# Patient Record
Sex: MALE | Race: WHITE | NOT HISPANIC OR LATINO | Employment: UNEMPLOYED | URBAN - METROPOLITAN AREA
[De-identification: names, ages, dates, MRNs, and addresses within clinical notes are randomized per-mention and may not be internally consistent; named-entity substitution may affect disease eponyms.]

---

## 2023-07-05 ENCOUNTER — OFFICE VISIT (OUTPATIENT)
Dept: PRIMARY CARE | Facility: CLINIC | Age: 78
End: 2023-07-05
Payer: MEDICARE

## 2023-07-05 ENCOUNTER — LAB (OUTPATIENT)
Dept: LAB | Facility: LAB | Age: 78
End: 2023-07-05
Payer: MEDICARE

## 2023-07-05 VITALS
WEIGHT: 248 LBS | DIASTOLIC BLOOD PRESSURE: 78 MMHG | HEIGHT: 75 IN | BODY MASS INDEX: 30.84 KG/M2 | SYSTOLIC BLOOD PRESSURE: 128 MMHG

## 2023-07-05 DIAGNOSIS — R53.83 OTHER FATIGUE: ICD-10-CM

## 2023-07-05 DIAGNOSIS — I25.10 ATHEROSCLEROSIS OF NATIVE CORONARY ARTERY WITHOUT ANGINA PECTORIS, UNSPECIFIED WHETHER NATIVE OR TRANSPLANTED HEART: ICD-10-CM

## 2023-07-05 DIAGNOSIS — E53.9 VITAMIN B DEFICIENCY: ICD-10-CM

## 2023-07-05 DIAGNOSIS — E55.9 VITAMIN D DEFICIENCY: ICD-10-CM

## 2023-07-05 DIAGNOSIS — E78.00 HYPERCHOLESTEROLEMIA: ICD-10-CM

## 2023-07-05 PROBLEM — R53.1 FEELING WEAK: Status: ACTIVE | Noted: 2023-07-05

## 2023-07-05 PROBLEM — R05.9 COUGH: Status: ACTIVE | Noted: 2023-07-05

## 2023-07-05 PROBLEM — R06.02 SHORTNESS OF BREATH: Status: ACTIVE | Noted: 2023-07-05

## 2023-07-05 PROBLEM — S61.411A LACERATION OF RIGHT HAND, INITIAL ENCOUNTER: Status: ACTIVE | Noted: 2023-07-05

## 2023-07-05 PROBLEM — N39.0 UTI (URINARY TRACT INFECTION): Status: ACTIVE | Noted: 2023-07-05

## 2023-07-05 LAB
ALANINE AMINOTRANSFERASE (SGPT) (U/L) IN SER/PLAS: 14 U/L (ref 10–52)
ALBUMIN (G/DL) IN SER/PLAS: 4.7 G/DL (ref 3.4–5)
ALKALINE PHOSPHATASE (U/L) IN SER/PLAS: 78 U/L (ref 33–136)
ANION GAP IN SER/PLAS: 13 MMOL/L (ref 10–20)
APPEARANCE, URINE: CLEAR
ASPARTATE AMINOTRANSFERASE (SGOT) (U/L) IN SER/PLAS: 21 U/L (ref 9–39)
BILIRUBIN TOTAL (MG/DL) IN SER/PLAS: 0.8 MG/DL (ref 0–1.2)
BILIRUBIN, URINE: NEGATIVE
BLOOD, URINE: ABNORMAL
CALCIDIOL (25 OH VITAMIN D3) (NG/ML) IN SER/PLAS: 31 NG/ML
CALCIUM (MG/DL) IN SER/PLAS: 9.8 MG/DL (ref 8.6–10.6)
CARBON DIOXIDE, TOTAL (MMOL/L) IN SER/PLAS: 27 MMOL/L (ref 21–32)
CHLORIDE (MMOL/L) IN SER/PLAS: 103 MMOL/L (ref 98–107)
COBALAMIN (VITAMIN B12) (PG/ML) IN SER/PLAS: 429 PG/ML (ref 211–911)
COLOR, URINE: YELLOW
CREATININE (MG/DL) IN SER/PLAS: 0.85 MG/DL (ref 0.5–1.3)
ERYTHROCYTE DISTRIBUTION WIDTH (RATIO) BY AUTOMATED COUNT: 13.8 % (ref 11.5–14.5)
ERYTHROCYTE MEAN CORPUSCULAR HEMOGLOBIN CONCENTRATION (G/DL) BY AUTOMATED: 32.6 G/DL (ref 32–36)
ERYTHROCYTE MEAN CORPUSCULAR VOLUME (FL) BY AUTOMATED COUNT: 104 FL (ref 80–100)
ERYTHROCYTES (10*6/UL) IN BLOOD BY AUTOMATED COUNT: 4.91 X10E12/L (ref 4.5–5.9)
GFR MALE: 90 ML/MIN/1.73M2
GLUCOSE (MG/DL) IN SER/PLAS: 87 MG/DL (ref 74–99)
GLUCOSE, URINE: NEGATIVE MG/DL
HEMATOCRIT (%) IN BLOOD BY AUTOMATED COUNT: 51.3 % (ref 41–52)
HEMOGLOBIN (G/DL) IN BLOOD: 16.7 G/DL (ref 13.5–17.5)
KETONES, URINE: NEGATIVE MG/DL
LEUKOCYTE ESTERASE, URINE: NEGATIVE
LEUKOCYTES (10*3/UL) IN BLOOD BY AUTOMATED COUNT: 6.1 X10E9/L (ref 4.4–11.3)
MUCUS, URINE: NORMAL /LPF
NITRITE, URINE: NEGATIVE
NRBC (PER 100 WBCS) BY AUTOMATED COUNT: 0 /100 WBC (ref 0–0)
PH, URINE: 5 (ref 5–8)
PLATELETS (10*3/UL) IN BLOOD AUTOMATED COUNT: 179 X10E9/L (ref 150–450)
POTASSIUM (MMOL/L) IN SER/PLAS: 4.5 MMOL/L (ref 3.5–5.3)
PROTEIN TOTAL: 7.6 G/DL (ref 6.4–8.2)
PROTEIN, URINE: NEGATIVE MG/DL
RBC, URINE: 2 /HPF (ref 0–5)
SODIUM (MMOL/L) IN SER/PLAS: 138 MMOL/L (ref 136–145)
SPECIFIC GRAVITY, URINE: 1.02 (ref 1–1.03)
SQUAMOUS EPITHELIAL CELLS, URINE: <1 /HPF
THYROTROPIN (MIU/L) IN SER/PLAS BY DETECTION LIMIT <= 0.05 MIU/L: 0.74 MIU/L (ref 0.44–3.98)
UREA NITROGEN (MG/DL) IN SER/PLAS: 20 MG/DL (ref 6–23)
UROBILINOGEN, URINE: <2 MG/DL (ref 0–1.9)
WBC, URINE: 1 /HPF (ref 0–5)

## 2023-07-05 PROCEDURE — 1159F MED LIST DOCD IN RCRD: CPT | Performed by: INTERNAL MEDICINE

## 2023-07-05 PROCEDURE — 36415 COLL VENOUS BLD VENIPUNCTURE: CPT

## 2023-07-05 PROCEDURE — 81001 URINALYSIS AUTO W/SCOPE: CPT

## 2023-07-05 PROCEDURE — 1126F AMNT PAIN NOTED NONE PRSNT: CPT | Performed by: INTERNAL MEDICINE

## 2023-07-05 PROCEDURE — 82607 VITAMIN B-12: CPT

## 2023-07-05 PROCEDURE — 80053 COMPREHEN METABOLIC PANEL: CPT

## 2023-07-05 PROCEDURE — 99214 OFFICE O/P EST MOD 30 MIN: CPT | Performed by: INTERNAL MEDICINE

## 2023-07-05 PROCEDURE — 84443 ASSAY THYROID STIM HORMONE: CPT

## 2023-07-05 PROCEDURE — 85027 COMPLETE CBC AUTOMATED: CPT

## 2023-07-05 PROCEDURE — 82306 VITAMIN D 25 HYDROXY: CPT

## 2023-07-05 RX ORDER — METOPROLOL SUCCINATE 100 MG/1
TABLET, EXTENDED RELEASE ORAL
COMMUNITY
End: 2023-08-09 | Stop reason: SDUPTHER

## 2023-07-05 RX ORDER — ROSUVASTATIN CALCIUM 10 MG/1
1 TABLET, COATED ORAL DAILY
COMMUNITY
Start: 2020-12-02 | End: 2023-10-27 | Stop reason: SDUPTHER

## 2023-07-05 RX ORDER — SACUBITRIL AND VALSARTAN 49; 51 MG/1; MG/1
1 TABLET, FILM COATED ORAL 2 TIMES DAILY
Qty: 60 TABLET | Refills: 2 | COMMUNITY
Start: 2023-05-20 | End: 2024-01-29 | Stop reason: ALTCHOICE

## 2023-07-05 RX ORDER — MIRABEGRON 25 MG/1
25 TABLET, FILM COATED, EXTENDED RELEASE ORAL DAILY
Qty: 30 TABLET | Refills: 0 | COMMUNITY
Start: 2023-06-10 | End: 2023-07-10

## 2023-07-05 RX ORDER — CLOPIDOGREL BISULFATE 75 MG/1
75 TABLET ORAL DAILY
COMMUNITY

## 2023-07-05 RX ORDER — RIVAROXABAN 20 MG/1
20 TABLET, FILM COATED ORAL DAILY
COMMUNITY

## 2023-07-05 NOTE — PROGRESS NOTES
OFFICE NOTE      NAME OF THE PATIENT: Tesfaye Milton    YOB: 1945    CHIEF COMPLAINT:  This gentleman today came here for follow-up on various conditions.  The patient is totally deconditioned.  He has no stamina.  Extremely tired, fatigued, and exhausted.  Easy fatigability.    PAST MEDICAL HISTORY:  Reviewed on EMR, unchanged.    CURRENT MEDICATIONS:  Reviewed on EMR, unchanged.  List reviewed.    ALLERGIES:  Reviewed on EMR, unchanged.    SOCIAL HISTORY:  Reviewed on EMR, unchanged.  He does not smoke and does not drink alcohol.    FAMILY HISTORY:  Reviewed on EMR, unchanged.    REVIEW OF SYSTEMS:  All 12 systems reviewed and pertaining covered in history and physical.    PHYSICAL EXAMINATION  VITAL SIGNS:  As recorded and reviewed from EMR.  RESPIRATORY:  The patient had normal inspirations and expirations.  The breath sounds were equal bilaterally and clear to auscultation.  CARDIOVASCULAR:  The patient had S1 normal, split S2 without obvious rubs, clicks, or murmurs.    GASTROINTESTINAL:  There was no hepatosplenomegaly.  There were no palpable masses and no inguinal nodes.  EXTREMITIES:  Legs had no edema.  NEUROLOGIC:  The patient had normal cranial nerves.  The reflexes, sensory, and motor examination were grossly within normal limits.    LAB WORK:  Laboratory testing discussed.    ASSESSMENT AND PLAN:  Congestive heart failure, on Entresto.  No echo over a year.  Immediate echocardiogram ordered.  He is on medication.  I wonder whether biventricular pacing is an option here.  We will find out.  Hypertension, okay.  High cholesterol, stable.  Anticoagulation, on Xarelto.  Prostate health.  Prostate cancer.  Dr. Roberts is taking care of it.  Complete blood work ordered.  I shall see him after an echo.      Kindly review this note in conjunction with EMR.   Subjective   Patient ID: Tesfaye Milton is a 77 y.o. male who presents for Follow-up.      HPI    Review of Systems    Objective   BP  "128/78   Ht 1.905 m (6' 3\")   Wt 112 kg (248 lb)   BMI 31.00 kg/m²       Physical Exam    Assessment/Plan   Problem List Items Addressed This Visit    None        "

## 2023-07-28 ENCOUNTER — TELEPHONE (OUTPATIENT)
Dept: PRIMARY CARE | Facility: CLINIC | Age: 78
End: 2023-07-28
Payer: MEDICARE

## 2023-07-28 NOTE — TELEPHONE ENCOUNTER
----- Message from Delia Araujo MD sent at 7/27/2023  4:43 PM EDT -----  Regarding: RE:  He should discuss with cardiologist first than make appointment with me after 8/5   S von  ----- Message -----  From: Emily Lawrence MA  Sent: 7/27/2023   4:41 PM EDT  To: Delia Araujo MD    LOV 07-05-23 you wanted this pt. To have an acho. Pt. Had echo done at a [facility]. He had Dr. Black his cardioloigst look at it and he said his ejection fracture was not getting any better so he upped his meds. If you wanted to speak to Dr. Black about his results his number is [phone]. In your note said see after echo but he's already seeing someone for this issue. When do you want him to follow up?

## 2023-08-09 ENCOUNTER — OFFICE VISIT (OUTPATIENT)
Dept: PRIMARY CARE | Facility: CLINIC | Age: 78
End: 2023-08-09
Payer: MEDICARE

## 2023-08-09 VITALS
SYSTOLIC BLOOD PRESSURE: 142 MMHG | WEIGHT: 249 LBS | BODY MASS INDEX: 30.96 KG/M2 | HEIGHT: 75 IN | DIASTOLIC BLOOD PRESSURE: 78 MMHG

## 2023-08-09 DIAGNOSIS — I50.9 CONGESTIVE HEART FAILURE, UNSPECIFIED HF CHRONICITY, UNSPECIFIED HEART FAILURE TYPE (MULTI): ICD-10-CM

## 2023-08-09 DIAGNOSIS — E78.00 HYPERCHOLESTEROLEMIA: ICD-10-CM

## 2023-08-09 DIAGNOSIS — I10 HYPERTENSION, UNSPECIFIED TYPE: ICD-10-CM

## 2023-08-09 PROCEDURE — 1126F AMNT PAIN NOTED NONE PRSNT: CPT | Performed by: INTERNAL MEDICINE

## 2023-08-09 PROCEDURE — 99214 OFFICE O/P EST MOD 30 MIN: CPT | Performed by: INTERNAL MEDICINE

## 2023-08-09 PROCEDURE — 3078F DIAST BP <80 MM HG: CPT | Performed by: INTERNAL MEDICINE

## 2023-08-09 PROCEDURE — 1159F MED LIST DOCD IN RCRD: CPT | Performed by: INTERNAL MEDICINE

## 2023-08-09 PROCEDURE — 3077F SYST BP >= 140 MM HG: CPT | Performed by: INTERNAL MEDICINE

## 2023-08-09 RX ORDER — METOPROLOL SUCCINATE 100 MG/1
100 TABLET, EXTENDED RELEASE ORAL DAILY
Qty: 90 TABLET | Refills: 0 | Status: SHIPPED | OUTPATIENT
Start: 2023-08-09 | End: 2023-11-07 | Stop reason: SDUPTHER

## 2023-08-09 RX ORDER — TAMSULOSIN HYDROCHLORIDE 0.4 MG/1
CAPSULE ORAL
COMMUNITY
Start: 2022-11-28

## 2023-08-09 ASSESSMENT — ENCOUNTER SYMPTOMS
DEPRESSION: 0
OCCASIONAL FEELINGS OF UNSTEADINESS: 0
LOSS OF SENSATION IN FEET: 0

## 2023-08-09 NOTE — PROGRESS NOTES
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  He saw Dr. Armijo who increased the dose of Entresto.  He had a repeat echocardiogram.  According to the patient, there is not much improvement.  Official report is pending.  Appetite and weight are okay.  No problem.    PAST MEDICAL HISTORY:  Reviewed on EMR, unchanged.    CURRENT MEDICATIONS:  Reviewed on EMR, unchanged.    ALLERGIES:  Reviewed on EMR, unchanged.    SOCIAL HISTORY:  Reviewed on EMR, unchanged.    FAMILY HISTORY:  Reviewed on EMR, unchanged.    REVIEW OF SYSTEMS:  All 12 systems reviewed and pertaining covered in history and physical.    PHYSICAL EXAMINATION  VITAL SIGNS:  As recorded and reviewed from EMR.  RESPIRATORY:  The patient had normal inspirations and expirations.  The breath sounds were equal bilaterally and clear to auscultation.  CARDIOVASCULAR:  The patient had S1 normal, split S2 without obvious rubs, clicks, or murmurs.    GASTROINTESTINAL:  There was no hepatosplenomegaly.  There were no palpable masses and no inguinal nodes.  EXTREMITIES:  Legs had no edema.  NEUROLOGIC:  The patient had normal cranial nerves.  The reflexes, sensory, and motor examination were grossly within normal limits.    LAB WORK:  Laboratory testing discussed.    ASSESSMENT AND PLAN:  Congestive heart failure in the setting of atrial fibrillation and ablation failure. Lifetime anticoagulation.  I wonder we need to consider defibrillator along with biventricular pacing, beyond scope of my practice.  I am going to send him to Dr. Preston or equivalent for further evaluation.  High cholesterol, on medication.  Monitor.  Hypertension, okay.  Follow-up appointment is six weeks with repeat blood work.  Entresto high dose.  We will monitor kidney function.  Continue to follow.    Kindly review this note in conjunction with EMR.   Subjective  "  Patient ID: Tesfaye Milton is a 77 y.o. male who presents for Follow-up.      HPI    Review of Systems    Objective   /78   Ht 1.905 m (6' 3\")   Wt 113 kg (249 lb)   BMI 31.12 kg/m²       Physical Exam    Assessment/Plan   Problem List Items Addressed This Visit    None  Visit Diagnoses       Hypertension, unspecified type        Relevant Medications    metoprolol succinate XL (Toprol-XL) 100 mg 24 hr tablet              "

## 2023-08-30 PROBLEM — I48.92 ATRIAL FLUTTER (MULTI): Status: ACTIVE | Noted: 2023-08-30

## 2023-08-30 PROBLEM — M19.90 ARTHRITIS: Status: ACTIVE | Noted: 2023-08-30

## 2023-08-30 PROBLEM — Z95.5 HISTORY OF CORONARY ARTERY STENT PLACEMENT: Status: ACTIVE | Noted: 2023-08-30

## 2023-08-30 PROBLEM — I42.9 CARDIOMYOPATHY (MULTI): Status: ACTIVE | Noted: 2023-08-30

## 2023-08-30 PROBLEM — K43.2 INCISIONAL HERNIA, WITHOUT OBSTRUCTION OR GANGRENE: Status: ACTIVE | Noted: 2023-08-30

## 2023-08-30 PROBLEM — I21.9 MYOCARDIAL INFARCTION (MULTI): Status: ACTIVE | Noted: 2023-08-30

## 2023-08-30 PROBLEM — I10 ESSENTIAL HYPERTENSION: Status: ACTIVE | Noted: 2023-08-30

## 2023-08-30 PROBLEM — I50.9 HEART FAILURE (MULTI): Status: ACTIVE | Noted: 2023-08-30

## 2023-08-30 PROBLEM — I48.20 CHRONIC ATRIAL FIBRILLATION (MULTI): Status: ACTIVE | Noted: 2023-08-30

## 2023-08-30 PROBLEM — Z91.89 AT RISK FOR BLEEDING: Status: ACTIVE | Noted: 2023-08-30

## 2023-08-30 PROBLEM — I48.19 PERSISTENT ATRIAL FIBRILLATION (MULTI): Status: ACTIVE | Noted: 2023-08-30

## 2023-08-30 PROBLEM — F17.210 CIGARETTE SMOKER: Status: ACTIVE | Noted: 2023-08-30

## 2023-08-30 PROBLEM — I48.0 PAROXYSMAL ATRIAL FIBRILLATION (MULTI): Status: ACTIVE | Noted: 2023-08-30

## 2023-08-30 RX ORDER — NITROGLYCERIN 0.4 MG/1
TABLET SUBLINGUAL
COMMUNITY

## 2023-08-30 RX ORDER — AMLODIPINE BESYLATE 10 MG/1
1 TABLET ORAL DAILY
COMMUNITY
End: 2024-01-29 | Stop reason: ALTCHOICE

## 2023-08-30 RX ORDER — MIRABEGRON 25 MG/1
TABLET, FILM COATED, EXTENDED RELEASE ORAL
COMMUNITY
Start: 2023-07-09

## 2023-08-30 RX ORDER — METOPROLOL TARTRATE 50 MG/1
1 TABLET ORAL 3 TIMES DAILY
COMMUNITY
End: 2024-01-29 | Stop reason: SDUPTHER

## 2023-08-30 RX ORDER — LOSARTAN POTASSIUM 100 MG/1
0.5 TABLET ORAL DAILY
COMMUNITY
End: 2024-01-29 | Stop reason: ALTCHOICE

## 2023-08-30 RX ORDER — PRAVASTATIN SODIUM 40 MG/1
1 TABLET ORAL DAILY
COMMUNITY
End: 2024-01-29 | Stop reason: ALTCHOICE

## 2023-09-20 ENCOUNTER — LAB (OUTPATIENT)
Dept: LAB | Facility: LAB | Age: 78
End: 2023-09-20
Payer: MEDICARE

## 2023-09-20 DIAGNOSIS — I10 HYPERTENSION, UNSPECIFIED TYPE: ICD-10-CM

## 2023-09-20 DIAGNOSIS — E78.00 HYPERCHOLESTEROLEMIA: ICD-10-CM

## 2023-09-20 LAB
ALANINE AMINOTRANSFERASE (SGPT) (U/L) IN SER/PLAS: 13 U/L (ref 10–52)
ALBUMIN (G/DL) IN SER/PLAS: 4.4 G/DL (ref 3.4–5)
ALKALINE PHOSPHATASE (U/L) IN SER/PLAS: 65 U/L (ref 33–136)
ANION GAP IN SER/PLAS: 14 MMOL/L (ref 10–20)
ASPARTATE AMINOTRANSFERASE (SGOT) (U/L) IN SER/PLAS: 16 U/L (ref 9–39)
BILIRUBIN TOTAL (MG/DL) IN SER/PLAS: 0.7 MG/DL (ref 0–1.2)
CALCIUM (MG/DL) IN SER/PLAS: 9.5 MG/DL (ref 8.6–10.6)
CARBON DIOXIDE, TOTAL (MMOL/L) IN SER/PLAS: 29 MMOL/L (ref 21–32)
CHLORIDE (MMOL/L) IN SER/PLAS: 103 MMOL/L (ref 98–107)
CHOLESTEROL (MG/DL) IN SER/PLAS: 155 MG/DL (ref 0–199)
CHOLESTEROL IN HDL (MG/DL) IN SER/PLAS: 51.9 MG/DL
CHOLESTEROL/HDL RATIO: 3
CREATININE (MG/DL) IN SER/PLAS: 0.96 MG/DL (ref 0.5–1.3)
GFR MALE: 82 ML/MIN/1.73M2
GLUCOSE (MG/DL) IN SER/PLAS: 81 MG/DL (ref 74–99)
LDL: 89 MG/DL (ref 0–99)
POTASSIUM (MMOL/L) IN SER/PLAS: 4.7 MMOL/L (ref 3.5–5.3)
PROTEIN TOTAL: 6.9 G/DL (ref 6.4–8.2)
SODIUM (MMOL/L) IN SER/PLAS: 141 MMOL/L (ref 136–145)
THYROTROPIN (MIU/L) IN SER/PLAS BY DETECTION LIMIT <= 0.05 MIU/L: 0.6 MIU/L (ref 0.44–3.98)
TRIGLYCERIDE (MG/DL) IN SER/PLAS: 70 MG/DL (ref 0–149)
UREA NITROGEN (MG/DL) IN SER/PLAS: 20 MG/DL (ref 6–23)
VLDL: 14 MG/DL (ref 0–40)

## 2023-09-20 PROCEDURE — 84443 ASSAY THYROID STIM HORMONE: CPT

## 2023-09-20 PROCEDURE — 80053 COMPREHEN METABOLIC PANEL: CPT

## 2023-09-20 PROCEDURE — 80061 LIPID PANEL: CPT

## 2023-09-20 PROCEDURE — 36415 COLL VENOUS BLD VENIPUNCTURE: CPT

## 2023-09-25 ENCOUNTER — OFFICE VISIT (OUTPATIENT)
Dept: PRIMARY CARE | Facility: CLINIC | Age: 78
End: 2023-09-25
Payer: MEDICARE

## 2023-09-25 VITALS
BODY MASS INDEX: 31.08 KG/M2 | WEIGHT: 250 LBS | HEIGHT: 75 IN | SYSTOLIC BLOOD PRESSURE: 140 MMHG | DIASTOLIC BLOOD PRESSURE: 84 MMHG

## 2023-09-25 DIAGNOSIS — Z00.00 MEDICARE ANNUAL WELLNESS VISIT, INITIAL: Primary | ICD-10-CM

## 2023-09-25 DIAGNOSIS — I10 HYPERTENSION, UNSPECIFIED TYPE: ICD-10-CM

## 2023-09-25 DIAGNOSIS — E78.00 HYPERCHOLESTEROLEMIA: ICD-10-CM

## 2023-09-25 DIAGNOSIS — I50.9 CONGESTIVE HEART FAILURE, UNSPECIFIED HF CHRONICITY, UNSPECIFIED HEART FAILURE TYPE (MULTI): ICD-10-CM

## 2023-09-25 PROCEDURE — 1159F MED LIST DOCD IN RCRD: CPT | Performed by: INTERNAL MEDICINE

## 2023-09-25 PROCEDURE — 3079F DIAST BP 80-89 MM HG: CPT | Performed by: INTERNAL MEDICINE

## 2023-09-25 PROCEDURE — 1170F FXNL STATUS ASSESSED: CPT | Performed by: INTERNAL MEDICINE

## 2023-09-25 PROCEDURE — 99214 OFFICE O/P EST MOD 30 MIN: CPT | Performed by: INTERNAL MEDICINE

## 2023-09-25 PROCEDURE — G0439 PPPS, SUBSEQ VISIT: HCPCS | Performed by: INTERNAL MEDICINE

## 2023-09-25 PROCEDURE — 1126F AMNT PAIN NOTED NONE PRSNT: CPT | Performed by: INTERNAL MEDICINE

## 2023-09-25 PROCEDURE — 3077F SYST BP >= 140 MM HG: CPT | Performed by: INTERNAL MEDICINE

## 2023-09-25 ASSESSMENT — ACTIVITIES OF DAILY LIVING (ADL)
TAKING_MEDICATION: INDEPENDENT
MANAGING_FINANCES: INDEPENDENT
DOING_HOUSEWORK: INDEPENDENT
DRESSING: INDEPENDENT
BATHING: INDEPENDENT
GROCERY_SHOPPING: INDEPENDENT

## 2023-09-25 ASSESSMENT — ENCOUNTER SYMPTOMS
LOSS OF SENSATION IN FEET: 0
DEPRESSION: 0
OCCASIONAL FEELINGS OF UNSTEADINESS: 0

## 2023-09-25 ASSESSMENT — PATIENT HEALTH QUESTIONNAIRE - PHQ9: 2. FEELING DOWN, DEPRESSED OR HOPELESS: NOT AT ALL

## 2023-09-25 NOTE — PROGRESS NOTES
"Subjective   Patient ID: Tesfaye Milton is a 77 y.o. male who presents for Medicare Annual Wellness Visit Initial and Follow-up (multiple medical issues).    It is always a delight to serve Mr. Milton today.  He came here for:  1. Medicare Wellness Visit.  2. Follow-up on blood work and other conditions.  Overall, he is okay.  No problem.  He is a retired dentist.    IMMUNIZATION:  He refused flu shot, pneumonia shot, shingles shot, he does not believe in it.    HEALTH MAINTENANCE:  Colonoscopy was three years ago.  Vision and eye exam okay.  He is regular with the eye checkup, dental checkup.    I have personally reviewed the patient's Past Medical History, Medications, Allergies, Social History, and Family History in the EMR.    Review of Systems   All other systems reviewed and are negative.    Objective   /84   Ht 1.905 m (6' 3\")   Wt 113 kg (250 lb)   BMI 31.25 kg/m²     Physical Exam  Vitals reviewed.   HENT:      Right Ear: Tympanic membrane, ear canal and external ear normal.      Left Ear: Tympanic membrane, ear canal and external ear normal.   Eyes:      General: No scleral icterus.     Pupils: Pupils are equal, round, and reactive to light.   Neck:      Vascular: No carotid bruit.   Cardiovascular:      Heart sounds: Normal heart sounds, S1 normal and S2 normal. No murmur heard.     No friction rub.   Pulmonary:      Effort: Pulmonary effort is normal.      Breath sounds: Normal breath sounds and air entry.   Abdominal:      Palpations: There is no hepatomegaly, splenomegaly or mass.   Genitourinary:     Comments: RECTAL: Deferred by the patient.  Musculoskeletal:         General: No swelling or deformity. Normal range of motion.      Cervical back: Neck supple.      Right lower leg: No edema.      Left lower leg: No edema.   Lymphadenopathy:      Cervical: No cervical adenopathy.      Upper Body:      Right upper body: No axillary adenopathy.      Left upper body: No axillary adenopathy.      " Lower Body: No right inguinal adenopathy. No left inguinal adenopathy.   Neurological:      Mental Status: He is oriented to person, place, and time.      Cranial Nerves: Cranial nerves 2-12 are intact. No cranial nerve deficit.      Sensory: No sensory deficit.      Motor: Motor function is intact. No weakness.      Gait: Gait is intact.      Deep Tendon Reflexes: Reflexes normal.   Psychiatric:         Mood and Affect: Mood is not anxious or depressed. Affect is not angry.         Behavior: Behavior is not agitated.         Thought Content: Thought content normal. Thought content does not include suicidal ideation.         Judgment: Judgment normal.      Comments: Mini-mental status exam okay.  The patient is full code.     LAB WORK: Laboratory testing discussed, done recently.    Assessment/Plan   Problem List Items Addressed This Visit             ICD-10-CM    Hypercholesterolemia E78.00    Relevant Orders    Comprehensive metabolic panel    Lipid panel     Other Visit Diagnoses         Codes    Medicare annual wellness visit, initial    -  Primary Z00.00    Hypertension, unspecified type     I10    Relevant Orders    CBC    Magnesium    Congestive heart failure, unspecified HF chronicity, unspecified heart failure type (CMS/[facility])     I50.9        1. Medicare Wellness Visit, done.  I filled out all the paper work.  The patient filled out paper work.  2. The patient refused immunization.  3. Up to date colonoscopy.  4. The patient is full code.  Not depressed, not suicidal.  5. Complete blood work ordered.  6.  Blood work okay.  7. Hypertension, okay.]  8. High cholesterol, stable.  9. Echo pending.  10. CHF, okay. I will try to get the report from Dr. Armijo.  I stressed the importance of colonoscopy.  11. I filled out all the paperwork.  12. Follow-up appointment with me for routine checkup in three months.  13. Blood work ordered.    Scribe Attestation  By signing my name below, I, Melba Rodriguez    attest that this documentation has been prepared under the direction and in the presence of Delia Araujo MD.

## 2023-10-16 ENCOUNTER — OFFICE VISIT (OUTPATIENT)
Dept: PRIMARY CARE | Facility: CLINIC | Age: 78
End: 2023-10-16
Payer: MEDICARE

## 2023-10-16 VITALS
BODY MASS INDEX: 31.08 KG/M2 | DIASTOLIC BLOOD PRESSURE: 84 MMHG | WEIGHT: 250 LBS | HEIGHT: 75 IN | SYSTOLIC BLOOD PRESSURE: 142 MMHG

## 2023-10-16 DIAGNOSIS — I50.9 CONGESTIVE HEART FAILURE, UNSPECIFIED HF CHRONICITY, UNSPECIFIED HEART FAILURE TYPE (MULTI): Primary | ICD-10-CM

## 2023-10-16 DIAGNOSIS — Z79.01 CURRENT USE OF LONG TERM ANTICOAGULATION: ICD-10-CM

## 2023-10-16 DIAGNOSIS — E78.00 HYPERCHOLESTEROLEMIA: ICD-10-CM

## 2023-10-16 DIAGNOSIS — R53.83 OTHER FATIGUE: ICD-10-CM

## 2023-10-16 DIAGNOSIS — I10 ESSENTIAL HYPERTENSION: ICD-10-CM

## 2023-10-16 PROCEDURE — 1126F AMNT PAIN NOTED NONE PRSNT: CPT | Performed by: INTERNAL MEDICINE

## 2023-10-16 PROCEDURE — 3079F DIAST BP 80-89 MM HG: CPT | Performed by: INTERNAL MEDICINE

## 2023-10-16 PROCEDURE — 3077F SYST BP >= 140 MM HG: CPT | Performed by: INTERNAL MEDICINE

## 2023-10-16 PROCEDURE — 1159F MED LIST DOCD IN RCRD: CPT | Performed by: INTERNAL MEDICINE

## 2023-10-16 PROCEDURE — 99214 OFFICE O/P EST MOD 30 MIN: CPT | Performed by: INTERNAL MEDICINE

## 2023-10-16 ASSESSMENT — ENCOUNTER SYMPTOMS
OCCASIONAL FEELINGS OF UNSTEADINESS: 0
DEPRESSION: 0
LOSS OF SENSATION IN FEET: 0

## 2023-10-17 PROBLEM — Z79.01 CURRENT USE OF LONG TERM ANTICOAGULATION: Status: ACTIVE | Noted: 2023-10-17

## 2023-10-17 PROBLEM — I50.9 CONGESTIVE HEART FAILURE (MULTI): Status: ACTIVE | Noted: 2023-10-17

## 2023-10-17 PROBLEM — R53.83 OTHER FATIGUE: Status: ACTIVE | Noted: 2023-10-17

## 2023-10-17 NOTE — PROGRESS NOTES
"Subjective   Patient ID: Tesfaye Milton is a 78 y.o. male who presents for Follow-up (multiple medical issues. ).    Mr. Tesfaye Milton today came here for multiple medical issues.  He is going for another echo later this week to see if there is a new set of any higher dose of Entresto.  Other than that he is okay.  He is feeling okay.  ______ edema.  He is maintaining his weight about same.  He does not want flu shot.  Today, he came here for follow-up on various conditions.  Appetite and weight are okay.  No problem.    I have personally reviewed the patient's Past Medical History, Medications, Allergies, Social History, and Family History in the EMR.    Review of Systems   All other systems reviewed and are negative.    Objective   /84   Ht 1.905 m (6' 3\")   Wt 113 kg (250 lb)   BMI 31.25 kg/m²     Physical Exam  Vitals reviewed.   Cardiovascular:      Heart sounds: Normal heart sounds, S1 normal and S2 normal. No murmur heard.     No friction rub.   Pulmonary:      Effort: Pulmonary effort is normal.      Breath sounds: Normal breath sounds and air entry.   Abdominal:      Palpations: There is no hepatomegaly, splenomegaly or mass.   Musculoskeletal:      Right lower leg: No edema.      Left lower leg: No edema.   Lymphadenopathy:      Lower Body: No right inguinal adenopathy. No left inguinal adenopathy.   Neurological:      Cranial Nerves: Cranial nerves 2-12 are intact.      Sensory: No sensory deficit.      Motor: Motor function is intact.      Deep Tendon Reflexes: Reflexes are normal and symmetric.     LAB WORK:  Laboratory testing discussed.    Assessment/Plan   Problem List Items Addressed This Visit             ICD-10-CM       Cardiac and Vasculature    Hypercholesterolemia E78.00    Relevant Orders    Lipid Panel    Essential hypertension I10    Relevant Orders    Comprehensive Metabolic Panel    Urinalysis with Reflex Microscopic    Thyroid Stimulating Hormone     Other Visit Diagnoses         " Codes    Current use of long term anticoagulation    -  Primary Z79.01    Congestive heart failure, unspecified HF chronicity, unspecified heart failure type (CMS/[facility])     I50.9    Other fatigue     R53.83    Relevant Orders    CBC        1. Congestive heart failure.  I requested again to  ______(Mohan?) to gave me the echo report, last one and this one.  2. Anticoagulation, on Xarelto.  3. Hypertension, okay.  4. High cholesterol, stable.  5. Weight.  Monitor.  6. Follow-up appointment with me in December.  Happy to see him anytime sooner if necessary.    Scribe Attestation  By signing my name below, I, Melba Rodriguez attest that this documentation has been prepared under the direction and in the presence of Delia Araujo MD.

## 2023-10-27 DIAGNOSIS — E78.00 HYPERCHOLESTEROLEMIA: ICD-10-CM

## 2023-10-27 RX ORDER — ROSUVASTATIN CALCIUM 10 MG/1
10 TABLET, COATED ORAL DAILY
Qty: 90 TABLET | Refills: 1 | Status: SHIPPED | OUTPATIENT
Start: 2023-10-27 | End: 2024-02-26

## 2023-11-07 DIAGNOSIS — I10 HYPERTENSION, UNSPECIFIED TYPE: ICD-10-CM

## 2023-11-07 RX ORDER — METOPROLOL SUCCINATE 100 MG/1
100 TABLET, EXTENDED RELEASE ORAL DAILY
Qty: 90 TABLET | Refills: 0 | Status: SHIPPED | OUTPATIENT
Start: 2023-11-07 | End: 2024-01-29 | Stop reason: SDUPTHER

## 2023-11-14 ENCOUNTER — OFFICE VISIT (OUTPATIENT)
Dept: CARDIOLOGY | Facility: CLINIC | Age: 78
End: 2023-11-14
Payer: MEDICARE

## 2023-11-14 VITALS — DIASTOLIC BLOOD PRESSURE: 86 MMHG | OXYGEN SATURATION: 98 % | SYSTOLIC BLOOD PRESSURE: 150 MMHG | HEART RATE: 92 BPM

## 2023-11-14 DIAGNOSIS — I48.19 PERSISTENT ATRIAL FIBRILLATION (MULTI): ICD-10-CM

## 2023-11-14 DIAGNOSIS — I48.20 CHRONIC ATRIAL FIBRILLATION (MULTI): Primary | ICD-10-CM

## 2023-11-14 PROCEDURE — 99214 OFFICE O/P EST MOD 30 MIN: CPT | Performed by: INTERNAL MEDICINE

## 2023-11-14 PROCEDURE — 1159F MED LIST DOCD IN RCRD: CPT | Performed by: INTERNAL MEDICINE

## 2023-11-14 PROCEDURE — 4004F PT TOBACCO SCREEN RCVD TLK: CPT | Performed by: INTERNAL MEDICINE

## 2023-11-14 PROCEDURE — 3079F DIAST BP 80-89 MM HG: CPT | Performed by: INTERNAL MEDICINE

## 2023-11-14 PROCEDURE — 1126F AMNT PAIN NOTED NONE PRSNT: CPT | Performed by: INTERNAL MEDICINE

## 2023-11-14 PROCEDURE — 3077F SYST BP >= 140 MM HG: CPT | Performed by: INTERNAL MEDICINE

## 2023-11-14 ASSESSMENT — PATIENT HEALTH QUESTIONNAIRE - PHQ9
1. LITTLE INTEREST OR PLEASURE IN DOING THINGS: NOT AT ALL
2. FEELING DOWN, DEPRESSED OR HOPELESS: NOT AT ALL
SUM OF ALL RESPONSES TO PHQ9 QUESTIONS 1 & 2: 0

## 2023-11-14 ASSESSMENT — ENCOUNTER SYMPTOMS
OCCASIONAL FEELINGS OF UNSTEADINESS: 0
LOSS OF SENSATION IN FEET: 0
DEPRESSION: 0

## 2023-11-14 ASSESSMENT — PAIN SCALES - GENERAL: PAINLEVEL: 0-NO PAIN

## 2023-11-14 NOTE — PROGRESS NOTES
Chief Complaint   Patient presents with    Follow-up     1 year follow up            The patient is well-known to me.  He is a 78-year-old male with a history of hypertension, and recurrent atrial arrhythmias post catheter-based therapy and post several attempts with antiarrhythmic drugs to maintain sinus rhythm.  Since taken a rate control approach with anticoagulation.  He is seeing me today for his yearly follow-up.         Active Ambulatory Problems     Diagnosis Date Noted    Atherosclerotic heart disease of native coronary artery without angina pectoris 07/05/2023    Cough 07/05/2023    Feeling weak 07/05/2023    Hypercholesterolemia 07/05/2023    Laceration of right hand, initial encounter 07/05/2023    Shortness of breath 07/05/2023    Vitamin B deficiency 07/05/2023    Vitamin D deficiency 07/05/2023    UTI (urinary tract infection) 07/05/2023    At risk for bleeding 08/30/2023    Arthritis 08/30/2023    Atrial flutter (CMS/HCC) 08/30/2023    Cardiomyopathy (CMS/HCC) 08/30/2023    Chronic atrial fibrillation (CMS/HCC) 08/30/2023    Paroxysmal atrial fibrillation (CMS/HCC) 08/30/2023    Persistent atrial fibrillation (CMS/HCC) 08/30/2023    Cigarette smoker 08/30/2023    Essential hypertension 08/30/2023    Heart failure (CMS/HCC) 08/30/2023    History of coronary artery stent placement 08/30/2023    Incisional hernia, without obstruction or gangrene 08/30/2023    Myocardial infarction (CMS/HCC) 08/30/2023    Congestive heart failure (CMS/HCC) 10/17/2023    Other fatigue 10/17/2023    Current use of long term anticoagulation 10/17/2023     Resolved Ambulatory Problems     Diagnosis Date Noted    No Resolved Ambulatory Problems     Past Medical History:   Diagnosis Date    Anxiety disorder, unspecified     Atrial fibrillation (CMS/HCC)     CAD (coronary artery disease)     CHF (congestive heart failure) (CMS/HCC)     High cholesterol     Hypertension     Long term (current) use of anticoagulants     Other  conditions influencing health status     Personal history of other diseases of male genital organs     Personal history of other diseases of the circulatory system 07/26/2013    Personal history of other endocrine, nutritional and metabolic disease 07/26/2013    Prostate disorder         Review of Systems   All other systems reviewed and are negative.       Objective     Vitals:    11/14/23 0837   BP: 150/86   Pulse: 92   SpO2: 98%        Constitutional:       Appearance: Healthy appearance. Overweight.   Pulmonary:      Effort: Pulmonary effort is normal.      Breath sounds: Normal breath sounds.   Cardiovascular:      PMI at left midclavicular line. Tachycardia present. Irregularly irregular rhythm.      Murmurs: There is a systolic murmur.      No gallop.  No click. No rub.   Pulses:     Intact distal pulses.   Abdominal:      General: Bowel sounds are normal.   Musculoskeletal:      Cervical back: Neck supple. Skin:     General: Skin is warm and dry.   Neurological:      General: No focal deficit present.            Lab Review:   Lab on 09/20/2023   Component Date Value    Glucose 09/20/2023 81     Sodium 09/20/2023 141     Potassium 09/20/2023 4.7     Chloride 09/20/2023 103     Bicarbonate 09/20/2023 29     Anion Gap 09/20/2023 14     Urea Nitrogen 09/20/2023 20     Creatinine 09/20/2023 0.96     GFR MALE 09/20/2023 82     Calcium 09/20/2023 9.5     Albumin 09/20/2023 4.4     Alkaline Phosphatase 09/20/2023 65     Total Protein 09/20/2023 6.9     AST 09/20/2023 16     Total Bilirubin 09/20/2023 0.7     ALT (SGPT) 09/20/2023 13     TSH 09/20/2023 0.60     Cholesterol 09/20/2023 155     HDL 09/20/2023 51.9     Cholesterol/HDL Ratio 09/20/2023 3.0     LDL 09/20/2023 89     VLDL 09/20/2023 14     Triglycerides 09/20/2023 70        ECG:      Problem List Items Addressed This Visit    None

## 2023-12-15 ENCOUNTER — LAB (OUTPATIENT)
Dept: LAB | Facility: LAB | Age: 78
End: 2023-12-15
Payer: MEDICARE

## 2023-12-15 DIAGNOSIS — I10 ESSENTIAL HYPERTENSION: ICD-10-CM

## 2023-12-15 DIAGNOSIS — I10 HYPERTENSION, UNSPECIFIED TYPE: ICD-10-CM

## 2023-12-15 DIAGNOSIS — R53.83 OTHER FATIGUE: ICD-10-CM

## 2023-12-15 DIAGNOSIS — E78.00 HYPERCHOLESTEROLEMIA: ICD-10-CM

## 2023-12-15 LAB
ALBUMIN SERPL BCP-MCNC: 4.2 G/DL (ref 3.4–5)
ALP SERPL-CCNC: 68 U/L (ref 33–136)
ALT SERPL W P-5'-P-CCNC: 19 U/L (ref 10–52)
ANION GAP SERPL CALC-SCNC: 15 MMOL/L (ref 10–20)
APPEARANCE UR: CLEAR
AST SERPL W P-5'-P-CCNC: 20 U/L (ref 9–39)
BILIRUB SERPL-MCNC: 0.9 MG/DL (ref 0–1.2)
BILIRUB UR STRIP.AUTO-MCNC: NEGATIVE MG/DL
BUN SERPL-MCNC: 20 MG/DL (ref 6–23)
CALCIUM SERPL-MCNC: 9.4 MG/DL (ref 8.6–10.6)
CHLORIDE SERPL-SCNC: 103 MMOL/L (ref 98–107)
CHOLEST SERPL-MCNC: 147 MG/DL (ref 0–199)
CHOLESTEROL/HDL RATIO: 3.1
CO2 SERPL-SCNC: 27 MMOL/L (ref 21–32)
COLOR UR: YELLOW
CREAT SERPL-MCNC: 1.07 MG/DL (ref 0.5–1.3)
ERYTHROCYTE [DISTWIDTH] IN BLOOD BY AUTOMATED COUNT: 13.6 % (ref 11.5–14.5)
GFR SERPL CREATININE-BSD FRML MDRD: 72 ML/MIN/1.73M*2
GLUCOSE SERPL-MCNC: 76 MG/DL (ref 74–99)
GLUCOSE UR STRIP.AUTO-MCNC: NEGATIVE MG/DL
HCT VFR BLD AUTO: 51.3 % (ref 41–52)
HDLC SERPL-MCNC: 47.6 MG/DL
HGB BLD-MCNC: 17.2 G/DL (ref 13.5–17.5)
KETONES UR STRIP.AUTO-MCNC: ABNORMAL MG/DL
LDLC SERPL CALC-MCNC: 82 MG/DL
LEUKOCYTE ESTERASE UR QL STRIP.AUTO: NEGATIVE
MAGNESIUM SERPL-MCNC: 2.55 MG/DL (ref 1.6–2.4)
MCH RBC QN AUTO: 35.6 PG (ref 26–34)
MCHC RBC AUTO-ENTMCNC: 33.5 G/DL (ref 32–36)
MCV RBC AUTO: 106 FL (ref 80–100)
MUCOUS THREADS #/AREA URNS AUTO: ABNORMAL /LPF
NITRITE UR QL STRIP.AUTO: NEGATIVE
NON HDL CHOLESTEROL: 99 MG/DL (ref 0–149)
NRBC BLD-RTO: 0 /100 WBCS (ref 0–0)
PH UR STRIP.AUTO: 5 [PH]
PLATELET # BLD AUTO: 157 X10*3/UL (ref 150–450)
POTASSIUM SERPL-SCNC: 4.7 MMOL/L (ref 3.5–5.3)
PROT SERPL-MCNC: 6.8 G/DL (ref 6.4–8.2)
PROT UR STRIP.AUTO-MCNC: ABNORMAL MG/DL
RBC # BLD AUTO: 4.83 X10*6/UL (ref 4.5–5.9)
RBC # UR STRIP.AUTO: ABNORMAL /UL
RBC #/AREA URNS AUTO: >20 /HPF
SODIUM SERPL-SCNC: 140 MMOL/L (ref 136–145)
SP GR UR STRIP.AUTO: 1.02
TRIGL SERPL-MCNC: 86 MG/DL (ref 0–149)
TSH SERPL-ACNC: 0.82 MIU/L (ref 0.44–3.98)
UROBILINOGEN UR STRIP.AUTO-MCNC: 2 MG/DL
VLDL: 17 MG/DL (ref 0–40)
WBC # BLD AUTO: 6 X10*3/UL (ref 4.4–11.3)
WBC #/AREA URNS AUTO: ABNORMAL /HPF

## 2023-12-15 PROCEDURE — 83735 ASSAY OF MAGNESIUM: CPT

## 2023-12-15 PROCEDURE — 84443 ASSAY THYROID STIM HORMONE: CPT

## 2023-12-15 PROCEDURE — 80053 COMPREHEN METABOLIC PANEL: CPT

## 2023-12-15 PROCEDURE — 85027 COMPLETE CBC AUTOMATED: CPT

## 2023-12-15 PROCEDURE — 80061 LIPID PANEL: CPT

## 2023-12-15 PROCEDURE — 81001 URINALYSIS AUTO W/SCOPE: CPT

## 2023-12-15 PROCEDURE — 36415 COLL VENOUS BLD VENIPUNCTURE: CPT

## 2023-12-27 ENCOUNTER — TELEPHONE (OUTPATIENT)
Dept: PRIMARY CARE | Facility: CLINIC | Age: 78
End: 2023-12-27
Payer: MEDICARE

## 2024-01-03 ENCOUNTER — OFFICE VISIT (OUTPATIENT)
Dept: PRIMARY CARE | Facility: CLINIC | Age: 79
End: 2024-01-03
Payer: MEDICARE

## 2024-01-03 VITALS
BODY MASS INDEX: 30.59 KG/M2 | DIASTOLIC BLOOD PRESSURE: 82 MMHG | WEIGHT: 246 LBS | SYSTOLIC BLOOD PRESSURE: 144 MMHG | HEIGHT: 75 IN

## 2024-01-03 DIAGNOSIS — Z12.5 SCREENING FOR MALIGNANT NEOPLASM OF PROSTATE: ICD-10-CM

## 2024-01-03 DIAGNOSIS — I10 ESSENTIAL HYPERTENSION: ICD-10-CM

## 2024-01-03 DIAGNOSIS — I35.0 AORTIC VALVE STENOSIS, ETIOLOGY OF CARDIAC VALVE DISEASE UNSPECIFIED: Primary | ICD-10-CM

## 2024-01-03 DIAGNOSIS — I50.9 CONGESTIVE HEART FAILURE, UNSPECIFIED HF CHRONICITY, UNSPECIFIED HEART FAILURE TYPE (MULTI): ICD-10-CM

## 2024-01-03 DIAGNOSIS — E78.00 HYPERCHOLESTEROLEMIA: ICD-10-CM

## 2024-01-03 DIAGNOSIS — R31.9 HEMATURIA, UNSPECIFIED TYPE: ICD-10-CM

## 2024-01-03 PROCEDURE — 99213 OFFICE O/P EST LOW 20 MIN: CPT | Performed by: INTERNAL MEDICINE

## 2024-01-03 PROCEDURE — 3077F SYST BP >= 140 MM HG: CPT | Performed by: INTERNAL MEDICINE

## 2024-01-03 PROCEDURE — 1126F AMNT PAIN NOTED NONE PRSNT: CPT | Performed by: INTERNAL MEDICINE

## 2024-01-03 PROCEDURE — 1159F MED LIST DOCD IN RCRD: CPT | Performed by: INTERNAL MEDICINE

## 2024-01-03 PROCEDURE — 3079F DIAST BP 80-89 MM HG: CPT | Performed by: INTERNAL MEDICINE

## 2024-01-03 ASSESSMENT — ENCOUNTER SYMPTOMS
OCCASIONAL FEELINGS OF UNSTEADINESS: 0
DEPRESSION: 0
LOSS OF SENSATION IN FEET: 0

## 2024-01-04 NOTE — PROGRESS NOTES
"Subjective   Patient ID: eTsfaye Milton is a 78 y.o. male who presents for Follow-up (multiple medical issues).    Tesfaye Milton today came here for multiple medical issues.  He is feeling okay.  Dr. Armijo did send me his echo report.  He is on Entresto.  Overall, he is okay, but at times he is tired out from sustained physical activity.  His endurance has gone down, but overall he is okay.  He came here for follow-up on various conditions.  He does not snore.  He does not have sleep apnea.  He had a blood work taken.    I have personally reviewed the patient's Past Medical History, Medications, Allergies, Social History, and Family History in the EMR.    Review of Systems   All other systems reviewed and are negative.    Objective   /82   Ht 1.905 m (6' 3\")   Wt 112 kg (246 lb)   BMI 30.75 kg/m²     Physical Exam  Vitals reviewed.   Cardiovascular:      Heart sounds: Normal heart sounds, S1 normal and S2 normal. No murmur heard.     No friction rub.   Pulmonary:      Effort: Pulmonary effort is normal.      Breath sounds: Normal breath sounds and air entry.   Abdominal:      Palpations: There is no hepatomegaly, splenomegaly or mass.   Musculoskeletal:      Right lower leg: No edema.      Left lower leg: No edema.   Lymphadenopathy:      Lower Body: No right inguinal adenopathy. No left inguinal adenopathy.   Neurological:      Cranial Nerves: Cranial nerves 2-12 are intact.      Sensory: No sensory deficit.      Motor: Motor function is intact.      Deep Tendon Reflexes: Reflexes are normal and symmetric.     LAB WORK:  Laboratory testing discussed.    Assessment/Plan   Problem List Items Addressed This Visit             ICD-10-CM       Cardiac and Vasculature    Hypercholesterolemia E78.00    Relevant Orders    Comprehensive Metabolic Panel    Lipid Panel    Essential hypertension I10    Relevant Orders    CBC    Thyroid Stimulating Hormone    Urinalysis with Reflex Microscopic    Congestive heart " failure (CMS/[facility]) I50.9    Relevant Orders    Referral to Cardiology     Other Visit Diagnoses         Codes    Aortic valve stenosis, etiology of cardiac valve disease unspecified    -  Primary I35.0    Screening for malignant neoplasm of prostate     Z12.5    Hematuria, unspecified type     R31.9        1. Low ejection fraction and aortic stenosis, moderate.  Combination of these two, he is on adequate drug therapy, beta-blocker and Entresto, but I think he will definitely benefit from second opinion for congestive heart failure management at tertiary level.  He will continue to follow with Dr. Platt and Dr. Armijo as his base cardiologist.  They are all working in a team.  I reassured him that they are not replacing each other.  2. Hypertension, okay.  3. High cholesterol, stable.  4. Prostate health and blood in the urine.  He sees Dr. Roberts.  5. Routine blood work ordered.  6. Blood work discussed.  7. I shall see him back in March, but always happy to see him anytime sooner if necessary.    Scribe Attestation  By signing my name below, I, Lelia Clayton, Melba attest that this documentation has been prepared under the direction and in the presence of Delia Araujo MD.

## 2024-01-29 ENCOUNTER — OFFICE VISIT (OUTPATIENT)
Dept: CARDIOLOGY | Facility: CLINIC | Age: 79
End: 2024-01-29
Payer: MEDICARE

## 2024-01-29 VITALS — SYSTOLIC BLOOD PRESSURE: 140 MMHG | DIASTOLIC BLOOD PRESSURE: 80 MMHG | HEART RATE: 79 BPM

## 2024-01-29 DIAGNOSIS — I48.21 PERMANENT ATRIAL FIBRILLATION (MULTI): Primary | ICD-10-CM

## 2024-01-29 DIAGNOSIS — I10 HYPERTENSION, UNSPECIFIED TYPE: ICD-10-CM

## 2024-01-29 PROCEDURE — 3079F DIAST BP 80-89 MM HG: CPT

## 2024-01-29 PROCEDURE — 3077F SYST BP >= 140 MM HG: CPT

## 2024-01-29 PROCEDURE — 99213 OFFICE O/P EST LOW 20 MIN: CPT

## 2024-01-29 PROCEDURE — 1126F AMNT PAIN NOTED NONE PRSNT: CPT

## 2024-01-29 PROCEDURE — 1159F MED LIST DOCD IN RCRD: CPT

## 2024-01-29 RX ORDER — SACUBITRIL AND VALSARTAN 97; 103 MG/1; MG/1
1 TABLET, FILM COATED ORAL 2 TIMES DAILY
COMMUNITY

## 2024-01-29 RX ORDER — METOPROLOL SUCCINATE 100 MG/1
100 TABLET, EXTENDED RELEASE ORAL DAILY
Qty: 90 TABLET | Refills: 0 | Status: SHIPPED | OUTPATIENT
Start: 2024-01-29 | End: 2024-02-26

## 2024-01-29 RX ORDER — METOPROLOL TARTRATE 50 MG/1
50 TABLET ORAL 3 TIMES DAILY
Qty: 90 TABLET | Refills: 5 | Status: SHIPPED | OUTPATIENT
Start: 2024-01-29 | End: 2024-02-26

## 2024-01-29 ASSESSMENT — ENCOUNTER SYMPTOMS
DEPRESSION: 0
OCCASIONAL FEELINGS OF UNSTEADINESS: 0
LOSS OF SENSATION IN FEET: 0
DYSPNEA ON EXERTION: 1

## 2024-01-29 ASSESSMENT — PATIENT HEALTH QUESTIONNAIRE - PHQ9
1. LITTLE INTEREST OR PLEASURE IN DOING THINGS: NOT AT ALL
2. FEELING DOWN, DEPRESSED OR HOPELESS: NOT AT ALL
SUM OF ALL RESPONSES TO PHQ9 QUESTIONS 1 AND 2: 0

## 2024-01-29 ASSESSMENT — COLUMBIA-SUICIDE SEVERITY RATING SCALE - C-SSRS
1. IN THE PAST MONTH, HAVE YOU WISHED YOU WERE DEAD OR WISHED YOU COULD GO TO SLEEP AND NOT WAKE UP?: NO
6. HAVE YOU EVER DONE ANYTHING, STARTED TO DO ANYTHING, OR PREPARED TO DO ANYTHING TO END YOUR LIFE?: NO
2. HAVE YOU ACTUALLY HAD ANY THOUGHTS OF KILLING YOURSELF?: NO

## 2024-01-29 NOTE — PROGRESS NOTES
Chief Complaint:   Follow up for chronic AF      History Of Present Illness:    Tesfaye Milton is a 78 y.o. male with a past medical history of atrial fibrillation, chronic systolic congestive heart failure, and hypertension.  Patient has failed numerous antiarrhythmic drugs and is post catheter-based therapy.  We have elected to utilize a rate control approach.  Patient is being seen today for a ZIO follow-up.  ZIO revealed a 100% atrial fibrillation burden with an average heart rate of 77 bpm.  Recent echocardiogram revealed a moderately decreased left ventricular systolic function at 40 to 45%, moderate aortic valve stenosis with a valve area of 1.3 cm and mild to moderate mitral valve regurgitation.  Patient follows with cardiologist Dr. Armijo.  He is compliant with his medications.     Last Recorded Vitals:  Vitals:    01/29/24 0843   BP: 140/80   Pulse: 79       Past Medical History:  He has a past medical history of Anxiety disorder, unspecified, Atrial fibrillation (CMS/HCC), CAD (coronary artery disease), CHF (congestive heart failure) (CMS/HCC), High cholesterol, Hypertension, Long term (current) use of anticoagulants, Other conditions influencing health status, Personal history of other diseases of male genital organs, Personal history of other diseases of the circulatory system (07/26/2013), Personal history of other endocrine, nutritional and metabolic disease (07/26/2013), and Prostate disorder.    Past Surgical History:  He has a past surgical history that includes Umbilical hernia repair (04/08/2013); Other surgical history (04/08/2013); Appendectomy (04/08/2013); and Knee arthroscopy w/ debridement (04/08/2013).      Social History:  He reports that he has quit smoking. His smoking use included cigarettes. He smoked an average of .25 packs per day. He has never used smokeless tobacco. He reports that he does not currently use alcohol after a past usage of about 7.0 standard drinks of alcohol  per week. He reports that he does not use drugs.    Family History:  Family History   Problem Relation Name Age of Onset    Stroke Mother      Dementia Mother      Stroke Father          Allergies:  Patient has no known allergies.    Outpatient Medications:  Current Outpatient Medications   Medication Instructions    clopidogrel (PLAVIX) 75 mg, oral, Daily    metoprolol succinate XL (TOPROL-XL) 100 mg, oral, Daily, Do not crush or chew.    metoprolol tartrate (LOPRESSOR) 50 mg, oral, 3 times daily, For 90 days    mirabegron (Myrbetriq) 25 mg tablet extended release 24 hr 24 hr tablet     nitroglycerin (Nitrostat) 0.4 mg SL tablet sublingual, may be repeated every 5 minutes up to a maximum of 3 doses in a 15 minute period<BR>    rosuvastatin (CRESTOR) 10 mg, oral, Daily    sacubitriL-valsartan (Entresto)  mg tablet 1 tablet, oral, 2 times daily    tamsulosin (Flomax) 0.4 mg 24 hr capsule     Xarelto 20 mg, oral, Daily       Physical Exam:  Physical Exam  Vitals and nursing note reviewed.   Constitutional:       Appearance: Normal appearance.   Cardiovascular:      Rate and Rhythm: Normal rate. Rhythm irregular.      Pulses: Normal pulses.      Heart sounds: Murmur heard.   Pulmonary:      Effort: Pulmonary effort is normal.      Breath sounds: Normal breath sounds.   Abdominal:      General: Bowel sounds are normal.      Palpations: Abdomen is soft.   Musculoskeletal:         General: Normal range of motion.   Skin:     General: Skin is warm and dry.   Neurological:      General: No focal deficit present.      Mental Status: He is alert and oriented to person, place, and time.      Review of Systems   Cardiovascular:  Positive for dyspnea on exertion.   All other systems reviewed and are negative.     Last Labs:  CBC -  Lab Results   Component Value Date    WBC 6.0 12/15/2023    HGB 17.2 12/15/2023    HCT 51.3 12/15/2023     (H) 12/15/2023     12/15/2023       CMP -  Lab Results   Component Value  Date    CALCIUM 9.4 12/15/2023    PROT 6.8 12/15/2023    ALBUMIN 4.2 12/15/2023    AST 20 12/15/2023    ALT 19 12/15/2023    ALKPHOS 68 12/15/2023    BILITOT 0.9 12/15/2023       LIPID PANEL -   Lab Results   Component Value Date    CHOL 147 12/15/2023    TRIG 86 12/15/2023    HDL 47.6 12/15/2023    CHHDL 3.1 12/15/2023    LDLF 89 09/20/2023    VLDL 17 12/15/2023    NHDL 99 12/15/2023       RENAL FUNCTION PANEL -   Lab Results   Component Value Date    GLUCOSE 76 12/15/2023     12/15/2023    K 4.7 12/15/2023     12/15/2023    CO2 27 12/15/2023    ANIONGAP 15 12/15/2023    BUN 20 12/15/2023    CREATININE 1.07 12/15/2023    GFRMALE 82 09/20/2023    CALCIUM 9.4 12/15/2023    ALBUMIN 4.2 12/15/2023        Lab Results   Component Value Date    HGBA1C 5.2 07/09/2021       Assessment/Plan   Diagnoses and all orders for this visit:  Permanent atrial fibrillation (CMS/HCC)  Hypertension, unspecified type  -     metoprolol succinate XL (Toprol-XL) 100 mg 24 hr tablet; Take 1 tablet (100 mg) by mouth once daily. Do not crush or chew.  -     metoprolol tartrate (Lopressor) 50 mg tablet; Take 1 tablet by mouth 3 times a day. For 90 days    History as above.  Continue current regimen.    Eleni Lawrence, APRN-CNP

## 2024-02-13 ENCOUNTER — APPOINTMENT (OUTPATIENT)
Dept: RADIOLOGY | Facility: HOSPITAL | Age: 79
DRG: 280 | End: 2024-02-13
Payer: MEDICARE

## 2024-02-13 ENCOUNTER — APPOINTMENT (OUTPATIENT)
Dept: CARDIOLOGY | Facility: HOSPITAL | Age: 79
DRG: 280 | End: 2024-02-13
Payer: MEDICARE

## 2024-02-13 ENCOUNTER — HOSPITAL ENCOUNTER (INPATIENT)
Facility: HOSPITAL | Age: 79
LOS: 6 days | Discharge: HOSPICE | DRG: 280 | End: 2024-02-19
Attending: EMERGENCY MEDICINE | Admitting: INTERNAL MEDICINE
Payer: MEDICARE

## 2024-02-13 DIAGNOSIS — I21.4 NSTEMI (NON-ST ELEVATED MYOCARDIAL INFARCTION) (MULTI): ICD-10-CM

## 2024-02-13 DIAGNOSIS — R57.0 CARDIOGENIC SHOCK (MULTI): ICD-10-CM

## 2024-02-13 DIAGNOSIS — I50.22 CHRONIC SYSTOLIC (CONGESTIVE) HEART FAILURE (MULTI): Primary | ICD-10-CM

## 2024-02-13 LAB
ALBUMIN SERPL-MCNC: 3 G/DL (ref 3.5–5)
ALP BLD-CCNC: 96 U/L (ref 35–125)
ALT SERPL-CCNC: 1073 U/L (ref 5–40)
ANION GAP BLDA CALCULATED.4IONS-SCNC: 21 MMO/L (ref 10–25)
ANION GAP SERPL CALC-SCNC: 19 MMOL/L
AORTIC VALVE MEAN GRADIENT: 13 MMHG
AORTIC VALVE PEAK VELOCITY: 2.48 M/S
APPEARANCE UR: ABNORMAL
ARTERIAL PATENCY WRIST A: POSITIVE
AST SERPL-CCNC: 1488 U/L (ref 5–40)
AV PEAK GRADIENT: 24.6 MMHG
AVA (PEAK VEL): 0.74 CM2
AVA (VTI): 0.63 CM2
BASE EXCESS BLDA CALC-SCNC: -7.2 MMOL/L (ref -2–3)
BASOPHILS # BLD AUTO: 0.02 X10*3/UL (ref 0–0.1)
BASOPHILS NFR BLD AUTO: 0.2 %
BILIRUB SERPL-MCNC: 1.9 MG/DL (ref 0.1–1.2)
BILIRUB UR STRIP.AUTO-MCNC: NEGATIVE MG/DL
BODY TEMPERATURE: 37 DEGREES CELSIUS
BUN SERPL-MCNC: 77 MG/DL (ref 8–25)
CA-I BLDA-SCNC: 1.07 MMOL/L (ref 1.1–1.33)
CALCIUM SERPL-MCNC: 7 MG/DL (ref 8.5–10.4)
CHLORIDE BLDA-SCNC: 100 MMOL/L (ref 98–107)
CHLORIDE SERPL-SCNC: 105 MMOL/L (ref 97–107)
CO2 SERPL-SCNC: 16 MMOL/L (ref 24–31)
COLOR UR: ABNORMAL
CREAT SERPL-MCNC: 2.9 MG/DL (ref 0.4–1.6)
EGFRCR SERPLBLD CKD-EPI 2021: 22 ML/MIN/1.73M*2
EJECTION FRACTION APICAL 4 CHAMBER: 34.3
EJECTION FRACTION: 27 %
EOSINOPHIL # BLD AUTO: 0 X10*3/UL (ref 0–0.4)
EOSINOPHIL NFR BLD AUTO: 0 %
EPAP CMH2O: 8 CM H2O
ERYTHROCYTE [DISTWIDTH] IN BLOOD BY AUTOMATED COUNT: 14.6 % (ref 11.5–14.5)
GLUCOSE BLD MANUAL STRIP-MCNC: 138 MG/DL (ref 74–99)
GLUCOSE BLDA-MCNC: 164 MG/DL (ref 74–99)
GLUCOSE SERPL-MCNC: 120 MG/DL (ref 65–99)
GLUCOSE UR STRIP.AUTO-MCNC: ABNORMAL MG/DL
HCO3 BLDA-SCNC: 17.1 MMOL/L (ref 22–26)
HCT VFR BLD AUTO: 45.4 % (ref 41–52)
HCT VFR BLD EST: 46 % (ref 41–52)
HGB BLD-MCNC: 14.7 G/DL (ref 13.5–17.5)
HGB BLDA-MCNC: 15.2 G/DL (ref 13.5–17.5)
HYALINE CASTS #/AREA URNS AUTO: ABNORMAL /LPF
IMM GRANULOCYTES # BLD AUTO: 0.08 X10*3/UL (ref 0–0.5)
IMM GRANULOCYTES NFR BLD AUTO: 0.7 % (ref 0–0.9)
INHALED O2 CONCENTRATION: 30 %
IPAP CMH2O: 14 CM H2O
KETONES UR STRIP.AUTO-MCNC: NEGATIVE MG/DL
LACTATE BLDA-SCNC: 3.3 MMOL/L (ref 0.4–2)
LACTATE BLDV-SCNC: 4.8 MMOL/L (ref 0.4–2)
LACTATE BLDV-SCNC: 4.8 MMOL/L (ref 0.4–2)
LACTATE BLDV-SCNC: 5.1 MMOL/L (ref 0.4–2)
LACTATE BLDV-SCNC: 5.8 MMOL/L (ref 0.4–2)
LEFT ATRIUM VOLUME AREA LENGTH INDEX BSA: 131.1 ML/M2
LEFT VENTRICLE INTERNAL DIMENSION DIASTOLE: 6.52 CM (ref 3.5–6)
LEFT VENTRICULAR OUTFLOW TRACT DIAMETER: 2 CM
LEUKOCYTE ESTERASE UR QL STRIP.AUTO: ABNORMAL
LYMPHOCYTES # BLD AUTO: 0.74 X10*3/UL (ref 0.8–3)
LYMPHOCYTES NFR BLD AUTO: 6.1 %
MCH RBC QN AUTO: 34.6 PG (ref 26–34)
MCHC RBC AUTO-ENTMCNC: 32.4 G/DL (ref 32–36)
MCV RBC AUTO: 107 FL (ref 80–100)
MONOCYTES # BLD AUTO: 1 X10*3/UL (ref 0.05–0.8)
MONOCYTES NFR BLD AUTO: 8.3 %
MUCOUS THREADS #/AREA URNS AUTO: ABNORMAL /LPF
NEUTROPHILS # BLD AUTO: 10.22 X10*3/UL (ref 1.6–5.5)
NEUTROPHILS NFR BLD AUTO: 84.7 %
NITRITE UR QL STRIP.AUTO: NEGATIVE
NRBC BLD-RTO: 0 /100 WBCS (ref 0–0)
NT-PROBNP SERPL-MCNC: ABNORMAL PG/ML (ref 0–852)
OXYHGB MFR BLDA: 94.2 % (ref 94–98)
PCO2 BLDA: 31 MM HG (ref 38–42)
PH BLDA: 7.35 PH (ref 7.38–7.42)
PH UR STRIP.AUTO: 5 [PH]
PLATELET # BLD AUTO: 210 X10*3/UL (ref 150–450)
PO2 BLDA: 119 MM HG (ref 85–95)
POTASSIUM BLDA-SCNC: 5.1 MMOL/L (ref 3.5–5.3)
POTASSIUM SERPL-SCNC: 4.5 MMOL/L (ref 3.4–5.1)
PROT SERPL-MCNC: 5.3 G/DL (ref 5.9–7.9)
PROT UR STRIP.AUTO-MCNC: ABNORMAL MG/DL
RBC # BLD AUTO: 4.25 X10*6/UL (ref 4.5–5.9)
RBC # UR STRIP.AUTO: ABNORMAL /UL
RBC #/AREA URNS AUTO: >20 /HPF
RIGHT VENTRICLE FREE WALL PEAK S': 6.2 CM/S
RIGHT VENTRICLE PEAK SYSTOLIC PRESSURE: 56.5 MMHG
SAO2 % BLDA: 97 % (ref 94–100)
SODIUM BLDA-SCNC: 133 MMOL/L (ref 136–145)
SODIUM SERPL-SCNC: 140 MMOL/L (ref 133–145)
SP GR UR STRIP.AUTO: 1.02
SPECIMEN DRAWN FROM PATIENT: ABNORMAL
SQUAMOUS #/AREA URNS AUTO: ABNORMAL /HPF
TRICUSPID ANNULAR PLANE SYSTOLIC EXCURSION: 1.2 CM
TROPONIN T SERPL-MCNC: 1236 NG/L
TROPONIN T SERPL-MCNC: 1746 NG/L
UROBILINOGEN UR STRIP.AUTO-MCNC: ABNORMAL MG/DL
VENTILATOR RATE: 16 BPM
WBC # BLD AUTO: 12.1 X10*3/UL (ref 4.4–11.3)
WBC #/AREA URNS AUTO: ABNORMAL /HPF

## 2024-02-13 PROCEDURE — 82947 ASSAY GLUCOSE BLOOD QUANT: CPT

## 2024-02-13 PROCEDURE — 2500000005 HC RX 250 GENERAL PHARMACY W/O HCPCS

## 2024-02-13 PROCEDURE — 84484 ASSAY OF TROPONIN QUANT: CPT | Performed by: PHYSICIAN ASSISTANT

## 2024-02-13 PROCEDURE — 51702 INSERT TEMP BLADDER CATH: CPT

## 2024-02-13 PROCEDURE — 93005 ELECTROCARDIOGRAM TRACING: CPT

## 2024-02-13 PROCEDURE — 83605 ASSAY OF LACTIC ACID: CPT | Performed by: PHYSICIAN ASSISTANT

## 2024-02-13 PROCEDURE — 2500000005 HC RX 250 GENERAL PHARMACY W/O HCPCS: Performed by: INTERNAL MEDICINE

## 2024-02-13 PROCEDURE — 3E033XZ INTRODUCTION OF VASOPRESSOR INTO PERIPHERAL VEIN, PERCUTANEOUS APPROACH: ICD-10-PCS | Performed by: INTERNAL MEDICINE

## 2024-02-13 PROCEDURE — 83605 ASSAY OF LACTIC ACID: CPT | Mod: MUE | Performed by: INTERNAL MEDICINE

## 2024-02-13 PROCEDURE — 81001 URINALYSIS AUTO W/SCOPE: CPT | Performed by: PHYSICIAN ASSISTANT

## 2024-02-13 PROCEDURE — 83880 ASSAY OF NATRIURETIC PEPTIDE: CPT | Performed by: PHYSICIAN ASSISTANT

## 2024-02-13 PROCEDURE — 36415 COLL VENOUS BLD VENIPUNCTURE: CPT | Performed by: PHYSICIAN ASSISTANT

## 2024-02-13 PROCEDURE — 80053 COMPREHEN METABOLIC PANEL: CPT | Performed by: PHYSICIAN ASSISTANT

## 2024-02-13 PROCEDURE — 2020000001 HC ICU ROOM DAILY

## 2024-02-13 PROCEDURE — 87040 BLOOD CULTURE FOR BACTERIA: CPT | Mod: WESLAB | Performed by: PHYSICIAN ASSISTANT

## 2024-02-13 PROCEDURE — 2500000004 HC RX 250 GENERAL PHARMACY W/ HCPCS (ALT 636 FOR OP/ED): Performed by: INTERNAL MEDICINE

## 2024-02-13 PROCEDURE — 96374 THER/PROPH/DIAG INJ IV PUSH: CPT | Mod: 59

## 2024-02-13 PROCEDURE — 94660 CPAP INITIATION&MGMT: CPT

## 2024-02-13 PROCEDURE — 85025 COMPLETE CBC W/AUTO DIFF WBC: CPT | Performed by: PHYSICIAN ASSISTANT

## 2024-02-13 PROCEDURE — 96365 THER/PROPH/DIAG IV INF INIT: CPT | Mod: 59

## 2024-02-13 PROCEDURE — 99285 EMERGENCY DEPT VISIT HI MDM: CPT | Mod: 25 | Performed by: EMERGENCY MEDICINE

## 2024-02-13 PROCEDURE — 71045 X-RAY EXAM CHEST 1 VIEW: CPT

## 2024-02-13 PROCEDURE — 2500000004 HC RX 250 GENERAL PHARMACY W/ HCPCS (ALT 636 FOR OP/ED): Performed by: PHYSICIAN ASSISTANT

## 2024-02-13 PROCEDURE — 84132 ASSAY OF SERUM POTASSIUM: CPT | Performed by: INTERNAL MEDICINE

## 2024-02-13 PROCEDURE — 2500000005 HC RX 250 GENERAL PHARMACY W/O HCPCS: Performed by: STUDENT IN AN ORGANIZED HEALTH CARE EDUCATION/TRAINING PROGRAM

## 2024-02-13 PROCEDURE — 36600 WITHDRAWAL OF ARTERIAL BLOOD: CPT

## 2024-02-13 PROCEDURE — 93306 TTE W/DOPPLER COMPLETE: CPT

## 2024-02-13 PROCEDURE — 87086 URINE CULTURE/COLONY COUNT: CPT | Mod: WESLAB | Performed by: PHYSICIAN ASSISTANT

## 2024-02-13 PROCEDURE — 5A09357 ASSISTANCE WITH RESPIRATORY VENTILATION, LESS THAN 24 CONSECUTIVE HOURS, CONTINUOUS POSITIVE AIRWAY PRESSURE: ICD-10-PCS | Performed by: INTERNAL MEDICINE

## 2024-02-13 PROCEDURE — 9420000001 HC RT PATIENT EDUCATION 5 MIN

## 2024-02-13 PROCEDURE — 99291 CRITICAL CARE FIRST HOUR: CPT | Performed by: INTERNAL MEDICINE

## 2024-02-13 RX ORDER — ROSUVASTATIN CALCIUM 10 MG/1
10 TABLET, COATED ORAL NIGHTLY
Status: DISCONTINUED | OUTPATIENT
Start: 2024-02-13 | End: 2024-02-13

## 2024-02-13 RX ORDER — METOPROLOL SUCCINATE 100 MG/1
100 TABLET, EXTENDED RELEASE ORAL DAILY
Status: DISCONTINUED | OUTPATIENT
Start: 2024-02-13 | End: 2024-02-19 | Stop reason: HOSPADM

## 2024-02-13 RX ORDER — CEFTRIAXONE 1 G/50ML
1 INJECTION, SOLUTION INTRAVENOUS EVERY 24 HOURS
Status: DISCONTINUED | OUTPATIENT
Start: 2024-02-13 | End: 2024-02-17

## 2024-02-13 RX ORDER — AMOXICILLIN 250 MG
2 CAPSULE ORAL 2 TIMES DAILY
Status: DISCONTINUED | OUTPATIENT
Start: 2024-02-13 | End: 2024-02-19 | Stop reason: HOSPADM

## 2024-02-13 RX ORDER — NOREPINEPHRINE BITARTRATE/D5W 8 MG/250ML
.01-.5 PLASTIC BAG, INJECTION (ML) INTRAVENOUS CONTINUOUS
Status: DISCONTINUED | OUTPATIENT
Start: 2024-02-13 | End: 2024-02-17

## 2024-02-13 RX ORDER — PANTOPRAZOLE SODIUM 40 MG/10ML
40 INJECTION, POWDER, LYOPHILIZED, FOR SOLUTION INTRAVENOUS
Status: DISCONTINUED | OUTPATIENT
Start: 2024-02-14 | End: 2024-02-19 | Stop reason: HOSPADM

## 2024-02-13 RX ORDER — DOBUTAMINE HYDROCHLORIDE 400 MG/100ML
2.5-5 INJECTION INTRAVENOUS CONTINUOUS
Status: DISCONTINUED | OUTPATIENT
Start: 2024-02-13 | End: 2024-02-19 | Stop reason: HOSPADM

## 2024-02-13 RX ORDER — TAMSULOSIN HYDROCHLORIDE 0.4 MG/1
0.4 CAPSULE ORAL DAILY
Status: DISCONTINUED | OUTPATIENT
Start: 2024-02-13 | End: 2024-02-19 | Stop reason: HOSPADM

## 2024-02-13 RX ORDER — METOPROLOL TARTRATE 50 MG/1
50 TABLET ORAL 3 TIMES DAILY
Status: DISCONTINUED | OUTPATIENT
Start: 2024-02-13 | End: 2024-02-19 | Stop reason: HOSPADM

## 2024-02-13 RX ORDER — NOREPINEPHRINE BITARTRATE/D5W 8 MG/250ML
.01-.5 PLASTIC BAG, INJECTION (ML) INTRAVENOUS CONTINUOUS
Status: DISCONTINUED | OUTPATIENT
Start: 2024-02-13 | End: 2024-02-13

## 2024-02-13 RX ORDER — PANTOPRAZOLE SODIUM 40 MG/1
40 TABLET, DELAYED RELEASE ORAL
Status: DISCONTINUED | OUTPATIENT
Start: 2024-02-14 | End: 2024-02-19 | Stop reason: HOSPADM

## 2024-02-13 RX ORDER — CLOPIDOGREL BISULFATE 75 MG/1
75 TABLET ORAL DAILY
Status: DISCONTINUED | OUTPATIENT
Start: 2024-02-13 | End: 2024-02-19 | Stop reason: HOSPADM

## 2024-02-13 RX ADMIN — NOREPINEPHRINE BITARTRATE 0.1 MCG/KG/MIN: 8 INJECTION, SOLUTION INTRAVENOUS at 15:13

## 2024-02-13 RX ADMIN — PIPERACILLIN SODIUM AND TAZOBACTAM SODIUM 3.38 G: 3; .375 INJECTION, SOLUTION INTRAVENOUS at 14:41

## 2024-02-13 RX ADMIN — Medication: at 18:15

## 2024-02-13 RX ADMIN — DOBUTAMINE IN DEXTROSE 2.5 MCG/KG/MIN: 400 INJECTION, SOLUTION INTRAVENOUS at 18:20

## 2024-02-13 RX ADMIN — NOREPINEPHRINE BITARTRATE 0.21 MCG/KG/MIN: 8 INJECTION, SOLUTION INTRAVENOUS at 21:56

## 2024-02-13 RX ADMIN — SODIUM CHLORIDE 500 ML: 900 INJECTION, SOLUTION INTRAVENOUS at 14:41

## 2024-02-13 RX ADMIN — CEFTRIAXONE SODIUM 1 G: 1 INJECTION, SOLUTION INTRAVENOUS at 22:16

## 2024-02-13 SDOH — SOCIAL STABILITY: SOCIAL INSECURITY: ABUSE: ADULT

## 2024-02-13 SDOH — SOCIAL STABILITY: SOCIAL INSECURITY: DOES ANYONE TRY TO KEEP YOU FROM HAVING/CONTACTING OTHER FRIENDS OR DOING THINGS OUTSIDE YOUR HOME?: NO

## 2024-02-13 SDOH — SOCIAL STABILITY: SOCIAL INSECURITY: HAS ANYONE EVER THREATENED TO HURT YOUR FAMILY OR YOUR PETS?: NO

## 2024-02-13 SDOH — SOCIAL STABILITY: SOCIAL INSECURITY: ARE THERE ANY APPARENT SIGNS OF INJURIES/BEHAVIORS THAT COULD BE RELATED TO ABUSE/NEGLECT?: NO

## 2024-02-13 SDOH — SOCIAL STABILITY: SOCIAL INSECURITY: WERE YOU ABLE TO COMPLETE ALL THE BEHAVIORAL HEALTH SCREENINGS?: YES

## 2024-02-13 SDOH — SOCIAL STABILITY: SOCIAL INSECURITY: DO YOU FEEL UNSAFE GOING BACK TO THE PLACE WHERE YOU ARE LIVING?: NO

## 2024-02-13 SDOH — SOCIAL STABILITY: SOCIAL INSECURITY: DO YOU FEEL ANYONE HAS EXPLOITED OR TAKEN ADVANTAGE OF YOU FINANCIALLY OR OF YOUR PERSONAL PROPERTY?: NO

## 2024-02-13 SDOH — SOCIAL STABILITY: SOCIAL INSECURITY: ARE YOU OR HAVE YOU BEEN THREATENED OR ABUSED PHYSICALLY, EMOTIONALLY, OR SEXUALLY BY ANYONE?: NO

## 2024-02-13 SDOH — SOCIAL STABILITY: SOCIAL INSECURITY: HAVE YOU HAD THOUGHTS OF HARMING ANYONE ELSE?: NO

## 2024-02-13 ASSESSMENT — COGNITIVE AND FUNCTIONAL STATUS - GENERAL
MOBILITY SCORE: 24
DAILY ACTIVITIY SCORE: 24
PATIENT BASELINE BEDBOUND: NO

## 2024-02-13 ASSESSMENT — LIFESTYLE VARIABLES
HOW MANY STANDARD DRINKS CONTAINING ALCOHOL DO YOU HAVE ON A TYPICAL DAY: PATIENT DOES NOT DRINK
PRESCIPTION_ABUSE_PAST_12_MONTHS: NO
HOW OFTEN DO YOU HAVE A DRINK CONTAINING ALCOHOL: NEVER
AUDIT-C TOTAL SCORE: 0
SUBSTANCE_ABUSE_PAST_12_MONTHS: NO
SKIP TO QUESTIONS 9-10: 1
AUDIT-C TOTAL SCORE: 0
HOW OFTEN DO YOU HAVE 6 OR MORE DRINKS ON ONE OCCASION: NEVER

## 2024-02-13 ASSESSMENT — ACTIVITIES OF DAILY LIVING (ADL)
LACK_OF_TRANSPORTATION: NO
WALKS IN HOME: INDEPENDENT
HEARING - LEFT EAR: FUNCTIONAL
JUDGMENT_ADEQUATE_SAFELY_COMPLETE_DAILY_ACTIVITIES: YES
ADEQUATE_TO_COMPLETE_ADL: YES
HEARING - RIGHT EAR: FUNCTIONAL
FEEDING YOURSELF: INDEPENDENT
PATIENT'S MEMORY ADEQUATE TO SAFELY COMPLETE DAILY ACTIVITIES?: YES
BATHING: INDEPENDENT
DRESSING YOURSELF: INDEPENDENT
GROOMING: INDEPENDENT
TOILETING: INDEPENDENT

## 2024-02-13 ASSESSMENT — PAIN - FUNCTIONAL ASSESSMENT: PAIN_FUNCTIONAL_ASSESSMENT: 0-10

## 2024-02-13 ASSESSMENT — PAIN SCALES - GENERAL: PAINLEVEL_OUTOF10: 0 - NO PAIN

## 2024-02-13 NOTE — SIGNIFICANT EVENT
Rt called to bedside for pt who arrived from the ed short of breath. Per Dr Gharibeh pt was placed on cpap +8 30%. Pt became apenic and switched to bipap 14/8 R 16. ABG being obtained

## 2024-02-13 NOTE — PROGRESS NOTES
IN BRIEF    History: 78-year-old male presents from his cardiologist office with complaint of shortness of breath.  Cardiology notified us of the patient prior to his arrival.  He has a history of ischemic cardiomyopathy and is noticing shortness of breath that is been worsening for the past several weeks.  There is been additional swelling and has been having associated chest pain as well.    Exam: On exam patient is comfortable when he is not moving however there is significant dyspnea upon minimal movement or exertion.  Lungs are coarse at the bases bilaterally.  Heart is regular rate and rhythm       ED COURSE   MDM: Patient presents with concern of acute heart failure exacerbation with concern for pulmonary edema and fluid overload.  He is placed on oxygen.  Initial blood pressure is stable, however his blood pressure has continually decreased on the emergency department.  Case is discussed with the cardiologist for initiation of potential dobutamine, they advised if needed to trial Levophed.  Do not suspect an acute infectious source at this time, however he will be covered with antibiotics from infectious source to be discovered in the setting of this hypotension.  Will trial a 500 cc bolus of fluids, however he appears grossly fluid overloaded in the setting of likely heart failure and pulmonary edema we will not provide the 30 cc/kg bolus.      I personally saw the patient and made/approved the management plan and take responsibility for the patient management.  Parts of this chart were completed with dictation software, please excuse any errors in transcription.     Amanda Buitrago, DO  2:44 PM

## 2024-02-13 NOTE — ED PROVIDER NOTES
Patient was signed out to me at approximately 2 PM by the AM physician Dr. Buitrago. This is a 78-year-old male sent to the ED by his cardiologist Dr. Murray with concern that he is in cardiogenic shock.  He has a history of CHF and CAD coming in with worsening shortness of breath over the past 6 weeks or so, swelling to the legs.  He was found be hypotensive in the hospital with systolic blood pressures in the 70s, was sent to the ED for further evaluation.  He does have a mild leukocytosis and was covered with Zosyn, sepsis workup was obtained.  He does have elevated lactic acid level and remains hypotensive after 500 cc fluid bolus.  There is concern for fluid overload and acute heart failure, in conjunction with the cardiologist this was made not to give any more fluids and start him on Levophed.  Goal blood pressure is MAP greater than 65 and systolic over 90.    He was found to have a BNP of over 50,000, troponin was elevated and he does have an acute kidney injury on a baseline of normal renal function.  Case was discussed with Dr. Murray again and with the intensivist, patient was admitted to the ICU in improved condition on Levophed.    Physical Exam  General: well developed, well nourished adult male who is awake and alert, tachypneic, mild conversational dyspnea on initial exam.  HENT: normocephalic, atraumatic.   CV: regular rate and rhythm, no murmur, no gallops, or rubs.   Resp: clear to ascultation bilaterally, no wheezes, rales, or rhonchi  GI: abdomen soft, nontender without rigidity or guarding, no peritoneal signs, abdomen is nondistended, no masses palpated  MSK: No midline spinal tenderness, mild swelling in the lower legs bilaterally  Psych: appropriate mood and affect, cooperative with exam  Skin: warm, dry, without evidence of rash or abrasions  .         Uvaldo Recinos,   02/13/24 [id number]

## 2024-02-13 NOTE — H&P (VIEW-ONLY)
History Of Present Illness  Tesfaye Milton is a 78 y.o. male presenting with went to his primary cardiology for shortness of breath and found to be hypotensive the patient has been sick for the last 3 to 4 weeks.  Last week he has been even more short of breath having orthopnea edema of the lower extremities cough with production of yellow sputum the patient has no chest pain no fever no chills no hemoptysis.  The patient drinks 1 cocktail every night for many years and he used to be smoker but he did not give me the exact amount and he quit few years ago.  The patient has history of prostate cancer treated with radiation implanted seeds  No occupational exposure to asbestos or silica        Past Medical History  Past Medical History:   Diagnosis Date    Anxiety disorder, unspecified     Anxiety    Atrial fibrillation (CMS/HCC)     CAD (coronary artery disease)     CHF (congestive heart failure) (CMS/HCC)     High cholesterol     Hypertension     Long term (current) use of anticoagulants     Anticoagulant long-term use    Other conditions influencing health status     Stent-related Vessel Entrapment    Personal history of other diseases of male genital organs     History of benign prostatic hypertrophy    Personal history of other diseases of the circulatory system 07/26/2013    History of hypertension    Personal history of other endocrine, nutritional and metabolic disease 07/26/2013    History of hypercholesterolemia    Prostate disorder        Surgical History  Past Surgical History:   Procedure Laterality Date    APPENDECTOMY  04/08/2013    Appendectomy    KNEE ARTHROSCOPY W/ DEBRIDEMENT  04/08/2013    Arthroscopy Knee Right    OTHER SURGICAL HISTORY  04/08/2013    Atrial Cardioversion    UMBILICAL HERNIA REPAIR  04/08/2013    Umbilical Hernia Repair        Social History  He reports that he has quit smoking. His smoking use included cigarettes. He smoked an average of .25 packs per day. He has never used  "smokeless tobacco. He reports that he does not currently use alcohol after a past usage of about 7.0 standard drinks of alcohol per week. He reports that he does not use drugs.    Family History  Family History   Problem Relation Name Age of Onset    Stroke Mother      Dementia Mother      Stroke Father          Allergies  Patient has no known allergies.    Review of Systems  All systems have been reviewed and have been negative except what stated in HPI  Physical Exam    General appears the patient looks in mild distress [place] tachypneic  Head and neck examination shows dry mucous membrane JVP is elevated he is slightly pale  Neck also showed that he has central tracking  Lung examination shows diffuse wheezes bilaterally with bibasilar crackles  Cardiac examination showed a regular rhythm with S3 and systolic ejection murmur.  Abdomen obese with a fluid thrill consistent with ascites organomegaly could not be felt but soft nontender  Edema lower extremity was noted with signs of chronic venous insufficiency  Neurologically he is alert oriented conscious x 4 without focal deficit.  Musculoskeletal there is no obvious deformity     Last Recorded Vitals  Blood pressure (!) 89/44, pulse 97, resp. rate (!) 49, height 1.905 m (6' 3\"), weight 109 kg (240 lb), SpO2 (!) 93 %.    Relevant Results    Scheduled medications     Continuous medications  norepinephrine, 0.01-0.5 mcg/kg/min, Last Rate: 0.12 mcg/kg/min (02/13/24 1640)      PRN medications    Results for orders placed or performed during the hospital encounter of 02/13/24 (from the past 24 hour(s))   CBC and Auto Differential   Result Value Ref Range    WBC 12.1 (H) 4.4 - 11.3 x10*3/uL    nRBC 0.0 0.0 - 0.0 /100 WBCs    RBC 4.25 (L) 4.50 - 5.90 x10*6/uL    Hemoglobin 14.7 13.5 - 17.5 g/dL    Hematocrit 45.4 41.0 - 52.0 %     (H) 80 - 100 fL    MCH 34.6 (H) 26.0 - 34.0 pg    MCHC 32.4 32.0 - 36.0 g/dL    RDW 14.6 (H) 11.5 - 14.5 %    Platelets 210 150 - 450 " x10*3/uL    Neutrophils % 84.7 40.0 - 80.0 %    Immature Granulocytes %, Automated 0.7 0.0 - 0.9 %    Lymphocytes % 6.1 13.0 - 44.0 %    Monocytes % 8.3 2.0 - 10.0 %    Eosinophils % 0.0 0.0 - 6.0 %    Basophils % 0.2 0.0 - 2.0 %    Neutrophils Absolute 10.22 (H) 1.60 - 5.50 x10*3/uL    Immature Granulocytes Absolute, Automated 0.08 0.00 - 0.50 x10*3/uL    Lymphocytes Absolute 0.74 (L) 0.80 - 3.00 x10*3/uL    Monocytes Absolute 1.00 (H) 0.05 - 0.80 x10*3/uL    Eosinophils Absolute 0.00 0.00 - 0.40 x10*3/uL    Basophils Absolute 0.02 0.00 - 0.10 x10*3/uL   NT Pro-BNP   Result Value Ref Range    PROBNP 50,175 (H) 0 - 852 pg/mL   Blood Gas Lactic Acid, Venous   Result Value Ref Range    POCT Lactate, Venous 5.8 (HH) 0.4 - 2.0 mmol/L   Transthoracic Echo (TTE) Complete   Result Value Ref Range    AV pk rios 2.48 m/s    LVOT diam 2.00 cm    AV mn grad 13.0 mmHg    Tricuspid annular plane systolic excursion 1.2 cm    LV biplane EF 27 %    LA vol index A/L 131.1 ml/m2    RV free wall pk S' 6.20 cm/s    RVSP 56.5 mmHg    LVIDd 6.52 cm    AV pk grad 24.6 mmHg    Aortic Valve Area by Continuity of VTI 0.63 cm2    Aortic Valve Area by Continuity of Peak Velocity 0.74 cm2    LV A4C EF 34.3    Comprehensive Metabolic Panel   Result Value Ref Range    Glucose 120 (H) 65 - 99 mg/dL    Sodium 140 133 - 145 mmol/L    Potassium 4.5 3.4 - 5.1 mmol/L    Chloride 105 97 - 107 mmol/L    Bicarbonate 16 (L) 24 - 31 mmol/L    Urea Nitrogen 77 (H) 8 - 25 mg/dL    Creatinine 2.90 (H) 0.40 - 1.60 mg/dL    eGFR 22 (L) >60 mL/min/1.73m*2    Calcium 7.0 (L) 8.5 - 10.4 mg/dL    Albumin 3.0 (L) 3.5 - 5.0 g/dL    Alkaline Phosphatase 96 35 - 125 U/L    Total Protein 5.3 (L) 5.9 - 7.9 g/dL    AST 1,488 (H) 5 - 40 U/L    Bilirubin, Total 1.9 (H) 0.1 - 1.2 mg/dL    ALT 1,073 (H) 5 - 40 U/L    Anion Gap 19 <=19 mmol/L   Serial Troponin, Initial ([facility])   Result Value Ref Range    Troponin T, High Sensitivity 1,236 (HH) <=14 ng/L   Blood Gas Lactic Acid,  Venous   Result Value Ref Range    POCT Lactate, Venous 4.8 (HH) 0.4 - 2.0 mmol/L   Urinalysis with Reflex Culture and Microscopic   Result Value Ref Range    Color, Urine Dark-Yellow Light-Yellow, Yellow, Dark-Yellow    Appearance, Urine Ex.Turbid (N) Clear    Specific Gravity, Urine 1.018 1.005 - 1.035    pH, Urine 5.0 5.0, 5.5, 6.0, 6.5, 7.0, 7.5, 8.0    Protein, Urine 100 (2+) (A) NEGATIVE, 10 (TRACE), 20 (TRACE) mg/dL    Glucose, Urine 50 (TRACE) (A) Normal mg/dL    Blood, Urine OVER (3+) (A) NEGATIVE    Ketones, Urine NEGATIVE NEGATIVE mg/dL    Bilirubin, Urine NEGATIVE NEGATIVE    Urobilinogen, Urine 6 (2+) (A) Normal mg/dL    Nitrite, Urine NEGATIVE NEGATIVE    Leukocyte Esterase, Urine 75 Jillian/µL (A) NEGATIVE   Microscopic Only, Urine   Result Value Ref Range    WBC, Urine 21-50 (A) 1-5, NONE /HPF    RBC, Urine >20 (A) NONE, 1-2, 3-5 /HPF    Squamous Epithelial Cells, Urine 1-9 (SPARSE) Reference range not established. /HPF    Mucus, Urine 2+ Reference range not established. /LPF    Hyaline Casts, Urine 3+ (A) NONE /LPF   Blood Gas Lactic Acid, Venous   Result Value Ref Range    POCT Lactate, Venous 4.8 (HH) 0.4 - 2.0 mmol/L     XR chest 1 view    Result Date: 2/13/2024  Interpreted By:  Nolberto Joy, STUDY: XR CHEST 1 VIEW; 2/13/2024 3:11 pm   INDICATION: Signs/Symptoms:shortness of breath.   COMPARISON: 10/28/2021 and 01/04/2019   ACCESSION NUMBER(S): [id number]   ORDERING CLINICIAN: ADORE RIVERA   TECHNIQUE: 1 view of the chest was performed.   FINDINGS: The lungs are adequately inflated. No acute consolidation. No pleural effusion. No pneumothorax.  The cardiomediastinal silhouette is mildly enlarged.       Mild cardiomegaly. No acute cardiopulmonary disease.   Signed by: Nolberto Joy 2/13/2024 3:46 PM Dictation workstation:   [id number]    Transthoracic Echo (TTE) Complete    Result Date: 2/13/2024           [facility] [id number] [place] [id number]             Phone [phone] TRANSTHORACIC ECHOCARDIOGRAM REPORT  Patient Name:      YAS DORANTES         Reading Physician:    [id number] Wayne Armijo MD Study Date:        2/13/2024             Ordering Provider:    [id number] OPAL NEVAREZ MRN/PID:           [id number]              Fellow: Accession#:        [id number]          Nurse: Date of Birth/Age: 1945 / 78 years  Sonographer:          Rustam Pena RDCS Gender:            M                     Additional Staff: Height:            188.00 cm             Admit Date: Weight:            110.00 kg             Admission Status:     Inpatient - STAT BSA / BMI:         2.36 m2 / 31.12 kg/m2 Department Location:  [facility] Blood Pressure: 112 /101 mmHg Study Type:    TRANSTHORACIC ECHO (TTE) COMPLETE Diagnosis/ICD: Chronic systolic (congestive) heart failure (CHF)-I50.22 Indication:    Congestive Heart Failure CPT Codes:     Echo Complete w Full Doppler-[id number] Patient History: Smoker:            Current. Pertinent History: CAD, Chest Pain, CHF, HTN, Hyperlipidemia, LE Edema, Murmur,                    Dyspnea and Syncope. PCI, HFrEF 40%, Abn Ekg. Study Detail: The following Echo studies were performed: 2D, M-Mode, Doppler and               color flow. Technically challenging study due to poor acoustic               windows and body habitus.  PHYSICIAN INTERPRETATION: Left Ventricle: Left ventricular systolic function is severely decreased, with an estimated ejection fraction of 20-25%. Wall motion is abnormal. The left ventricular cavity size is mildly dilated. Spectral Doppler shows an impaired relaxation pattern of left ventricular diastolic filling. LV Wall Scoring: The RCA distribution, entire apex, basal and mid inferolateral wall, basal anteroseptal segment, and mid anterolateral  segment are hypokinetic. All remaining scored segments are normal. Left Atrium: The left atrium is moderately dilated. Right Ventricle: The right ventricle is slightly enlarged. There is mildly reduced right ventricular systolic function. Right Atrium: The right atrium is moderately dilated. Aortic Valve: The aortic valve is trileaflet. There is no evidence of aortic valve regurgitation. The peak instantaneous gradient of the aortic valve is 24.6 mmHg. The mean gradient of the aortic valve is 13.0 mmHg. Mitral Valve: The mitral valve is normal in structure. There is moderate mitral valve regurgitation. Tricuspid Valve: The tricuspid valve is structurally normal. There is mild to moderate tricuspid regurgitation. The Doppler estimated RVSP is moderately elevated at 56.5 mmHg. Pulmonic Valve: The pulmonic valve is not well visualized. There is no indication of pulmonic valve regurgitation. Pericardium: There is no pericardial effusion noted. Aorta: The aortic root is normal.  CONCLUSIONS:  1. Left ventricular systolic function is severely decreased with a 20-25% estimated ejection fraction.  2. Multiple segmental abnormalities exist. See findings.  3. Spectral Doppler shows an impaired relaxation pattern of left ventricular diastolic filling.  4. There is mildly reduced right ventricular systolic function.  5. The left atrium is moderately dilated.  6. The right atrium is moderately dilated.  7. Moderate mitral valve regurgitation.  8. Mild to moderate tricuspid regurgitation.  9. Moderately elevated right ventricular systolic pressure. QUANTITATIVE DATA SUMMARY: 2D MEASUREMENTS:                           Normal Ranges: LAs:           6.70 cm    (2.7-4.0cm) IVSd:          0.98 cm    (0.6-1.1cm) LVPWd:         0.89 cm    (0.6-1.1cm) LVIDd:         6.52 cm    (3.9-5.9cm) LVIDs:         5.67 cm LV Mass Index: 111.3 g/m2 LV % FS        13.0 % LA VOLUME:                                Normal Ranges: LA Vol A4C:         316.5 ml    (22+/-6mL/m2) LA Vol A2C:        218.3 ml LA Vol BP:         309.3 ml LA Vol Index A4C:  134.2ml/m2 LA Vol Index A2C:  92.5 ml/m2 LA Vol Index BP:   131.1 ml/m2 LA Area A4C:       44.0 cm2 LA Area A2C:       43.0 cm2 LA Major Axis A4C: 5.2 cm LA Major Axis A2C: 7.2 cm LA Volume Index:   89.0 ml/m2 LA Vol A4C:        190.0 ml LA Vol A2C:        205.0 ml RA VOLUME BY A/L METHOD:                       Normal Ranges: RA Area A4C: 34.0 cm2 AORTA MEASUREMENTS:                    Normal Ranges: Asc Ao, d: 3.40 cm (2.1-3.4cm) LV SYSTOLIC FUNCTION BY 2D PLANIMETRY (MOD):                     Normal Ranges: EF-A4C View: 34.3 % (>=55%) EF-A2C View: 23.4 % EF-Biplane:  26.5 % LV DIASTOLIC FUNCTION:                     Normal Ranges: MV Peak E: 0.96 m/s (0.7-1.2 m/s) MITRAL VALVE:                 Normal Ranges: MV DT: 194 msec (150-240msec) MITRAL INSUFFICIENCY:                           Normal Ranges: PISA Radius:  0.7 cm MR VTI:       93.10 cm MR Vmax:      380.00 cm/s MR Alias Murtaza: 38.5 cm/s MR Volume:    29.04 ml MR Flow Rt:   118.53 ml/s MR EROA:      0.31 cm2 AORTIC VALVE:                                    Normal Ranges: AoV Vmax:                2.48 m/s  (<=1.7m/s) AoV Peak P.6 mmHg (<20mmHg) AoV Mean P.0 mmHg (1.7-11.5mmHg) LVOT Max Mrutaza:            0.59 m/s  (<=1.1m/s) AoV VTI:                 44.40 cm  (18-25cm) LVOT VTI:                8.97 cm LVOT Diameter:           2.00 cm   (1.8-2.4cm) AoV Area, VTI:           0.63 cm2  (2.5-5.5cm2) AoV Area,Vmax:           0.74 cm2  (2.5-4.5cm2) AoV Dimensionless Index: 0.20 AORTIC INSUFFICIENCY: AI Vmax:       2.62 m/s AI Half-time:  482 msec AI Decel Rate: 141.00 cm/s2  RIGHT VENTRICLE: RV Basal 5.50 cm RV Mid   4.42 cm RV Major 6.9 cm TAPSE:   12.4 mm RV s'    0.06 m/s TRICUSPID VALVE/RVSP:                             Normal Ranges: Peak TR Velocity: 3.22 m/s RV Syst Pressure: 56.5 mmHg (< 30mmHg) IVC Diam:         2.59 cm PULMONIC  VALVE:                      Normal Ranges: PV Max Murtaza: 1.3 m/s  (0.6-0.9m/s) PV Max P.0 mmHg  [id number] Wayne Armijo MD Electronically signed on 2024 at 2:48:39 PM  Wall Scoring  ** Final **    Shock likely cardiogenic echocardiogram showed ejection fraction of 25%.      Assessment/Plan     Shock likely cardiogenic bedside echocardiogram showed ejection fraction 25% he has history of ischemic cardiomyopathy cardiology will be following he is currently in Levophed.  The cause of current decompensation could be acute acute ischemic event versus infection.  Positive troponin likely due to decompensated heart failure will continue antiplatelet statin and cardiology is following  Acute on chronic renal failure likely cardiorenal we will keep mean arterial pressure more than 65 and monitor urine output  Elevated liver enzymes likely due to liver congestion secondary to right-sided heart failure we will trend liver enzymes we will hold statin due to the hepatotoxic effect.  Possible UTI the patient has no active symptoms we will start antibiotic empirically until infection is ruled out  Metabolic acidosis due to kidney failure and lactic acidosis we will check venous blood gas  Will discuss with cardiology optimal anticoagulation we will start therapeutic Lovenox 1 mg/kg/day  Will start PPI.  Critical care time is 40 minutes           I spent 40 minutes in the professional and overall care of this patient.      Tarek R Gharibeh, MD

## 2024-02-13 NOTE — NURSING NOTE
Received pt from ed via cart and placed into ICU bed 13.  Pt diaphoretic with audible rhonchi while breathing.  Pt alert x 3.  Blood sugar 138.  RT paged and Dr. Gharibeh here at the bedside.

## 2024-02-13 NOTE — NURSING NOTE
RT here at the bedside and pt placed on cpap per Dr. Gharibeh 14/8 30%, sats increased to 98%.  Call light within reach.

## 2024-02-13 NOTE — ED PROVIDER NOTES
HPI   Chief Complaint   Patient presents with    Shortness of Breath     Patient arrives via triage with complaints of shortness of breath. States history of cardiac stents, 2 cardiac abaltions for afib. Dr sent for admission for possible CHF and COPD.       HPI    Patient is a 78-year-old male with a history of CHF and coronary artery disease senting for evaluation of shortness of breath.  Patient states that the shortness of breath has been getting progressively worse over the past 6 weeks.  He appreciates additional swelling to his legs without reports of weight gain.  Patient states that his shortness of breath is all the time with intermittently associated chest pains.  He states his shortness of breath is worse with ambulation and better with rest.  She was sent from his cardiologist office for workup and admittance to the hospital given his complaints of shortness of breath.  He has a history of heart catheterization with multiple stents.  Admits to tobacco use.          No data recorded                   Patient History   Past Medical History:   Diagnosis Date    Anxiety disorder, unspecified     Anxiety    Atrial fibrillation (CMS/HCC)     CAD (coronary artery disease)     CHF (congestive heart failure) (CMS/HCC)     High cholesterol     Hypertension     Long term (current) use of anticoagulants     Anticoagulant long-term use    Other conditions influencing health status     Stent-related Vessel Entrapment    Personal history of other diseases of male genital organs     History of benign prostatic hypertrophy    Personal history of other diseases of the circulatory system 07/26/2013    History of hypertension    Personal history of other endocrine, nutritional and metabolic disease 07/26/2013    History of hypercholesterolemia    Prostate disorder      Past Surgical History:   Procedure Laterality Date    APPENDECTOMY  04/08/2013    Appendectomy    KNEE ARTHROSCOPY W/ DEBRIDEMENT  04/08/2013    Arthroscopy  Knee Right    OTHER SURGICAL HISTORY  04/08/2013    Atrial Cardioversion    UMBILICAL HERNIA REPAIR  04/08/2013    Umbilical Hernia Repair     Family History   Problem Relation Name Age of Onset    Stroke Mother      Dementia Mother      Stroke Father       Social History     Tobacco Use    Smoking status: Former     Packs/day: .25     Types: Cigarettes    Smokeless tobacco: Never   Substance Use Topics    Alcohol use: Not Currently     Alcohol/week: 7.0 standard drinks of alcohol     Types: 7 Standard drinks or equivalent per week    Drug use: Never       Physical Exam   ED Triage Vitals [02/13/24 1310]   Temp Heart Rate Respirations BP   -- (!) 42 18 (!) 110/92      Pulse Ox Temp src Heart Rate Source Patient Position   98 % -- -- --      BP Location FiO2 (%)     -- --       Physical Exam    GENERAL: Well nourished and well developed. Answers questions appropriately.    SKIN: Clean and dry without evidence of rashes.    HEENT: Skull normocephalic, atraumatic. No scleral icterus. PERRLA, with EOMI.  Mucous membranes moist and pink in color.     RESPIRATORY: Rhonchi appreciated to the bilateral bases.  Remainder of lung fields clear without adventitious sounds, intercostal retractions or shallow breathing.    CARDIOVASCULAR: Regular rate and rhythm with normal S1, S2. No S3, S4, murmurs or gallops. Capillary refill brisk, less than 3 seconds bilaterally. No unilateral lower extremity edema.    ABD/: Soft, nontender abdomen. Bowel sounds equal in all four quadrants. No tenderness, rebound, guarding or rigidity.    MSK: Spontaneously moves all 4 extremities without difficulty.  No injury or obvious deformity.  No midline neck or back pain or step-off deformities including cervical, thoracic or the lumbar spine.      NEURO/PSYCH: A&Ox3. Cranial nerves II-XII grossly intact. No focal motor or sensory deficits. Appropriate mood and behavior.    ED Course & MDM   ED Course as of 02/13/24 1641   Tue Feb 13, 2024   1385  EKG personally interpreted by me performed at 1317    Atrial fibrillation with ventricular rate 92 downsloping ST depression in lateral leads, no prior for comparison [EF]      ED Course User Index  [EF] Amanda Buitrago,          Diagnoses as of 02/13/24 1641   Cardiogenic shock (CMS/[facility])       Medical Decision Making  Parts of this chart have been completed using voice recognition software. Please excuse any errors of transcription. Despite the medical decision making time stamp above-my medical decision making has taken place during the patient's entire visit. My thought process and reason for plan has been formulated from the time that I saw the patient until the time of disposition and is not specific to one specific moment during their visit and furthermore my MDM encompasses this entire chart and not only this text box.      HPI: Detailed above.    Exam: A medically appropriate exam performed, outlined above, given the known history and presentation.    History obtained from: Patient    Social Determinants of Health considered during this visit: Age, tobacco user    EKG interpreted by my attending physician, reviewed by myself.    Labs/Diagnostics:     Labs Reviewed   CBC WITH AUTO DIFFERENTIAL - Abnormal       Result Value    WBC 12.1 (*)     nRBC 0.0      RBC 4.25 (*)     Hemoglobin 14.7      Hematocrit 45.4       (*)     MCH 34.6 (*)     MCHC 32.4      RDW 14.6 (*)     Platelets 210      Neutrophils % 84.7      Immature Granulocytes %, Automated 0.7      Lymphocytes % 6.1      Monocytes % 8.3      Eosinophils % 0.0      Basophils % 0.2      Neutrophils Absolute 10.22 (*)     Immature Granulocytes Absolute, Automated 0.08      Lymphocytes Absolute 0.74 (*)     Monocytes Absolute 1.00 (*)     Eosinophils Absolute 0.00      Basophils Absolute 0.02     COMPREHENSIVE METABOLIC PANEL - Abnormal    Glucose 120 (*)     Sodium 140      Potassium 4.5      Chloride 105      Bicarbonate 16 (*)     Urea Nitrogen  77 (*)     Creatinine 2.90 (*)     eGFR 22 (*)     Calcium 7.0 (*)     Albumin 3.0 (*)     Alkaline Phosphatase 96      Total Protein 5.3 (*)     AST 1,488 (*)     Bilirubin, Total 1.9 (*)     ALT 1,073 (*)     Anion Gap 19     BLOOD GAS LACTIC ACID, VENOUS - Abnormal    POCT Lactate, Venous 5.8 (*)    N-TERMINAL PROBNP - Abnormal    PROBNP 50,175 (*)     Narrative:     Reference ranges are based on clinical submission data. These ranges represent the 95th percentile of normal cut-off points. As NT Pro- BNP values approach 1000 pg/ml, clinical symptoms are more likely associated with CHF.   SERIAL TROPONIN, INITIAL ([facility]) - Abnormal    Troponin T, High Sensitivity 1,236 (*)    BLOOD GAS LACTIC ACID, VENOUS - Abnormal    POCT Lactate, Venous 4.8 (*)    BLOOD GAS LACTIC ACID, VENOUS - Abnormal    POCT Lactate, Venous 4.8 (*)    BLOOD CULTURE   BLOOD CULTURE   URINALYSIS WITH REFLEX CULTURE AND MICROSCOPIC    Narrative:     The following orders were created for panel order Urinalysis with Reflex Culture and Microscopic.  Procedure                               Abnormality         Status                     ---------                               -----------         ------                     Urinalysis with Reflex C...[[id number]]                      In process                 Extra Urine Gray Tube[[id number]]                            In process                   Please view results for these tests on the individual orders.   URINALYSIS WITH REFLEX CULTURE AND MICROSCOPIC   EXTRA URINE GRAY TUBE   TROPONIN T SERIES, HIGH SENSITIVITY (0, 2 HR, 6 HR)    Narrative:     The following orders were created for panel order Troponin T Series, High Sensitivity (0, 2HR, 6HR).  Procedure                               Abnormality         Status                     ---------                               -----------         ------                     Serial Troponin, Initial...[[id number]]  Abnormal            Final result                Serial Troponin, 2 Hour ...[[id number]]                                                 Serial Troponin, 6 Hour ...[[id number]]                                                   Please view results for these tests on the individual orders.   SERIAL TROPONIN,  2 HOUR ([facility])   SERIAL TROPONIN, 6 HOUR ([facility])   SERIAL TROPONIN,  2 HOUR ([facility])          Medications given during visit:     Medications   norepinephrine (Levophed) 8 mg in dextrose 5% 250 mL (0.032 mg/mL) infusion (premix) (0.1 mcg/kg/min × 109 kg intravenous Rate/Dose Change 2/13/24 1534)   sodium chloride 0.9 % bolus 500 mL (0 mL intravenous Stopped 2/13/24 1520)   piperacillin-tazobactam-dextrose (Zosyn) IV 3.375 g (0 g intravenous Stopped 2/13/24 1512)        Differential diagnoses considered for this visit include: Acute coronary syndrome, COPD exacerbation, CHF exacerbation, fluid retention    Considerations/further MDM:    Patient is a 78-year-old male presenting for evaluation of shortness of breath.  Vital signs appreciated mild hypotension with a pressure of 110/92.  Patient was resting comfortably on my entry into the room.  Physical examination appreciated rhonchi to the bilateral bases of the lungs.  Prior to patient's arrival, I had spoken directly with Dr.Raj Armijo writing this patient, who he sent over from his office.  Patient was found to be hypotensive and hypoxic in the office.  Dr. Armijo requested that the patient come to the emergency room for admittance to the hospital with an echo scheduled for today.  I placed the orders as requested, in addition to the sepsis bundle while withholding fluids secondary to suspected fluid overload.  Lactic acid was elevated at 5.8, repeat of 4.8 after gentle 500mL fluid bolus.  His proBNP is significantly elevated at 50,175.  His presentation is consistent with cardiogenic shock.  Given that the patient is on Levophed drip, he did require admittance to the ICU. I contacted the on-call  intensivist, who evaluated the patient at the bedside, separate the patient under his services to the ICU in good condition.      Patient was seen in conjunction with attending physician Dr. Amanda Buitrago.   Patient's history, physical exam, diagnostic studies, and treatment plan were discussed thoroughly.    Procedure  Procedures     Mary Jo Vazquez PA-C  02/13/24 3423

## 2024-02-14 ENCOUNTER — APPOINTMENT (OUTPATIENT)
Dept: RADIOLOGY | Facility: HOSPITAL | Age: 79
DRG: 280 | End: 2024-02-14
Payer: MEDICARE

## 2024-02-14 LAB
ANION GAP BLDA CALCULATED.4IONS-SCNC: 15 MMO/L (ref 10–25)
ANION GAP SERPL CALC-SCNC: >19 MMOL/L
APPARATUS: ABNORMAL
ARTERIAL PATENCY WRIST A: POSITIVE
BACTERIA UR CULT: NO GROWTH
BASE EXCESS BLDA CALC-SCNC: -3.2 MMOL/L (ref -2–3)
BODY TEMPERATURE: 37 DEGREES CELSIUS
BUN SERPL-MCNC: 101 MG/DL (ref 8–25)
CA-I BLDA-SCNC: 1.06 MMOL/L (ref 1.1–1.33)
CALCIUM SERPL-MCNC: 8.2 MG/DL (ref 8.5–10.4)
CHLORIDE BLDA-SCNC: 101 MMOL/L (ref 98–107)
CHLORIDE SERPL-SCNC: 99 MMOL/L (ref 97–107)
CO2 SERPL-SCNC: 21 MMOL/L (ref 24–31)
CREAT SERPL-MCNC: 4.3 MG/DL (ref 0.4–1.6)
EGFRCR SERPLBLD CKD-EPI 2021: 14 ML/MIN/1.73M*2
EPAP CMH2O: 8 CM H2O
ERYTHROCYTE [DISTWIDTH] IN BLOOD BY AUTOMATED COUNT: 14.5 % (ref 11.5–14.5)
GLUCOSE BLD MANUAL STRIP-MCNC: 157 MG/DL (ref 74–99)
GLUCOSE BLDA-MCNC: 172 MG/DL (ref 74–99)
GLUCOSE SERPL-MCNC: 171 MG/DL (ref 65–99)
HCO3 BLDA-SCNC: 19.9 MMOL/L (ref 22–26)
HCT VFR BLD AUTO: 41.3 % (ref 41–52)
HCT VFR BLD EST: 44 % (ref 41–52)
HGB BLD-MCNC: 13.7 G/DL (ref 13.5–17.5)
HGB BLDA-MCNC: 14.7 G/DL (ref 13.5–17.5)
HOLD SPECIMEN: NORMAL
INHALED O2 CONCENTRATION: 30 %
INSPIRATORY TIME: 0.9
IPAP CMH2O: 14 CM H2O
LACTATE BLDA-SCNC: 1.6 MMOL/L (ref 0.4–2)
MAGNESIUM SERPL-MCNC: 2.9 MG/DL (ref 1.6–3.1)
MCH RBC QN AUTO: 34.5 PG (ref 26–34)
MCHC RBC AUTO-ENTMCNC: 33.2 G/DL (ref 32–36)
MCV RBC AUTO: 104 FL (ref 80–100)
NRBC BLD-RTO: 0 /100 WBCS (ref 0–0)
OXYHGB MFR BLDA: 96.3 % (ref 94–98)
PCO2 BLDA: 30 MM HG (ref 38–42)
PH BLDA: 7.43 PH (ref 7.38–7.42)
PLATELET # BLD AUTO: 208 X10*3/UL (ref 150–450)
PO2 BLDA: 132 MM HG (ref 85–95)
POTASSIUM BLDA-SCNC: 4.4 MMOL/L (ref 3.5–5.3)
POTASSIUM SERPL-SCNC: 5.1 MMOL/L (ref 3.4–5.1)
RBC # BLD AUTO: 3.97 X10*6/UL (ref 4.5–5.9)
SAO2 % BLDA: 99 % (ref 94–100)
SODIUM BLDA-SCNC: 131 MMOL/L (ref 136–145)
SODIUM SERPL-SCNC: 140 MMOL/L (ref 133–145)
SPECIMEN DRAWN FROM PATIENT: ABNORMAL
TROPONIN T SERPL-MCNC: 1800 NG/L
VENTILATOR MODE: ABNORMAL
VENTILATOR RATE: 16 BPM
WBC # BLD AUTO: 11.9 X10*3/UL (ref 4.4–11.3)

## 2024-02-14 PROCEDURE — 2500000001 HC RX 250 WO HCPCS SELF ADMINISTERED DRUGS (ALT 637 FOR MEDICARE OP)

## 2024-02-14 PROCEDURE — 82947 ASSAY GLUCOSE BLOOD QUANT: CPT

## 2024-02-14 PROCEDURE — 84132 ASSAY OF SERUM POTASSIUM: CPT

## 2024-02-14 PROCEDURE — 2500000004 HC RX 250 GENERAL PHARMACY W/ HCPCS (ALT 636 FOR OP/ED)

## 2024-02-14 PROCEDURE — 36415 COLL VENOUS BLD VENIPUNCTURE: CPT

## 2024-02-14 PROCEDURE — 2500000005 HC RX 250 GENERAL PHARMACY W/O HCPCS

## 2024-02-14 PROCEDURE — 83735 ASSAY OF MAGNESIUM: CPT

## 2024-02-14 PROCEDURE — 2500000001 HC RX 250 WO HCPCS SELF ADMINISTERED DRUGS (ALT 637 FOR MEDICARE OP): Performed by: INTERNAL MEDICINE

## 2024-02-14 PROCEDURE — 36600 WITHDRAWAL OF ARTERIAL BLOOD: CPT

## 2024-02-14 PROCEDURE — 99291 CRITICAL CARE FIRST HOUR: CPT | Performed by: INTERNAL MEDICINE

## 2024-02-14 PROCEDURE — 9420000001 HC RT PATIENT EDUCATION 5 MIN

## 2024-02-14 PROCEDURE — 84484 ASSAY OF TROPONIN QUANT: CPT | Performed by: INTERNAL MEDICINE

## 2024-02-14 PROCEDURE — C9113 INJ PANTOPRAZOLE SODIUM, VIA: HCPCS

## 2024-02-14 PROCEDURE — 2500000004 HC RX 250 GENERAL PHARMACY W/ HCPCS (ALT 636 FOR OP/ED): Performed by: INTERNAL MEDICINE

## 2024-02-14 PROCEDURE — 84132 ASSAY OF SERUM POTASSIUM: CPT | Performed by: INTERNAL MEDICINE

## 2024-02-14 PROCEDURE — 85027 COMPLETE CBC AUTOMATED: CPT

## 2024-02-14 PROCEDURE — 76770 US EXAM ABDO BACK WALL COMP: CPT

## 2024-02-14 PROCEDURE — 94660 CPAP INITIATION&MGMT: CPT

## 2024-02-14 PROCEDURE — 2020000001 HC ICU ROOM DAILY

## 2024-02-14 PROCEDURE — 36415 COLL VENOUS BLD VENIPUNCTURE: CPT | Performed by: INTERNAL MEDICINE

## 2024-02-14 RX ORDER — ASPIRIN 81 MG/1
81 TABLET ORAL DAILY
Status: DISCONTINUED | OUTPATIENT
Start: 2024-02-14 | End: 2024-02-19 | Stop reason: HOSPADM

## 2024-02-14 RX ADMIN — ASPIRIN 81 MG: 81 TABLET, COATED ORAL at 21:05

## 2024-02-14 RX ADMIN — NOREPINEPHRINE BITARTRATE 0.21 MCG/KG/MIN: 8 INJECTION, SOLUTION INTRAVENOUS at 16:44

## 2024-02-14 RX ADMIN — NOREPINEPHRINE BITARTRATE 0.16 MCG/KG/MIN: 8 INJECTION, SOLUTION INTRAVENOUS at 23:08

## 2024-02-14 RX ADMIN — SENNOSIDES AND DOCUSATE SODIUM 2 TABLET: 50; 8.6 TABLET ORAL at 08:51

## 2024-02-14 RX ADMIN — PANTOPRAZOLE SODIUM 40 MG: 40 INJECTION, POWDER, FOR SOLUTION INTRAVENOUS at 05:07

## 2024-02-14 RX ADMIN — CEFTRIAXONE SODIUM 1 G: 1 INJECTION, SOLUTION INTRAVENOUS at 21:05

## 2024-02-14 RX ADMIN — NOREPINEPHRINE BITARTRATE 0.21 MCG/KG/MIN: 8 INJECTION, SOLUTION INTRAVENOUS at 10:35

## 2024-02-14 RX ADMIN — SENNOSIDES AND DOCUSATE SODIUM 2 TABLET: 50; 8.6 TABLET ORAL at 21:05

## 2024-02-14 RX ADMIN — CLOPIDOGREL BISULFATE 75 MG: 75 TABLET ORAL at 08:51

## 2024-02-14 RX ADMIN — NOREPINEPHRINE BITARTRATE 0.2 MCG/KG/MIN: 8 INJECTION, SOLUTION INTRAVENOUS at 04:03

## 2024-02-14 ASSESSMENT — ENCOUNTER SYMPTOMS
SINUS PRESSURE: 0
ADENOPATHY: 0
HEMATURIA: 0
VOMITING: 0
FREQUENCY: 0
HEADACHES: 0
FEVER: 0
NAUSEA: 0
DIARRHEA: 0
NUMBNESS: 0
ABDOMINAL PAIN: 0
COLOR CHANGE: 0
POLYDIPSIA: 0
ARTHRALGIAS: 0
PHOTOPHOBIA: 0
CONFUSION: 0
BLOOD IN STOOL: 0
WHEEZING: 0
CONSTIPATION: 0
SEIZURES: 0
RECTAL PAIN: 0
TREMORS: 0
BRUISES/BLEEDS EASILY: 0
PALPITATIONS: 0
SLEEP DISTURBANCE: 0
SHORTNESS OF BREATH: 1
DIZZINESS: 0
WEAKNESS: 1
NECK PAIN: 0
POLYPHAGIA: 0
COUGH: 0
FATIGUE: 1
LIGHT-HEADEDNESS: 0
MYALGIAS: 0
WOUND: 0
SORE THROAT: 0
JOINT SWELLING: 0
SPEECH DIFFICULTY: 0
DYSURIA: 0
BACK PAIN: 0
CHILLS: 0
APNEA: 0
HALLUCINATIONS: 0

## 2024-02-14 ASSESSMENT — COGNITIVE AND FUNCTIONAL STATUS - GENERAL
CLIMB 3 TO 5 STEPS WITH RAILING: A LITTLE
WALKING IN HOSPITAL ROOM: A LITTLE
MOBILITY SCORE: 22
DAILY ACTIVITIY SCORE: 24

## 2024-02-14 ASSESSMENT — PAIN - FUNCTIONAL ASSESSMENT
PAIN_FUNCTIONAL_ASSESSMENT: 0-10

## 2024-02-14 ASSESSMENT — PAIN SCALES - GENERAL
PAINLEVEL_OUTOF10: 0 - NO PAIN

## 2024-02-14 NOTE — PROGRESS NOTES
.Reason For Consult  Acute kidney injury and low urine output    History Of Present Illness  Tesfaye Milton is a 78 y.o. male who is known to have significant cardiac history including coronary artery disease, status post stenting, history of chronic atrial fibrillation on Xarelto and also status post ablation in the past, history of hypertension and hyperlipidemia apparently the patient went to visit with his cardiologist yesterday afternoon he was not feeling well he was complaining severe dyspnea on exertion and feeling tired and fatigued lost his stamina he was found to be hypotensive also he was found to have acute kidney injury with elevated serum creatinine level the patient was sent to the emergency room his cardiologist came into the emergency room and saw him and wrote orders he requested nephrology consultation because of a new onset of acute kidney injury and low urine output the patient was admitted to the intensive care unit he was started on norepinephrine drip as well as dobutamine drip at 5 mcg/kg/min it seemed that he is hemodynamically more stable today he is awake and responsive he is hard of hearing he denies any chest pain he denies any nausea vomiting diarrhea he also had a history of bladder cancer recently had microscopic hematuria so he underwent cystoscopy about 10 days ago and there was no evidence of cancer in the bladder he did have a stone in the bladder.     Review of Systems  Review of Systems   Constitutional:  Positive for fatigue. Negative for chills and fever.   HENT:  Negative for sinus pressure, sore throat and tinnitus.    Eyes:  Negative for photophobia and visual disturbance.   Respiratory:  Positive for shortness of breath. Negative for apnea, cough and wheezing.    Cardiovascular:  Positive for leg swelling. Negative for chest pain and palpitations.   Gastrointestinal:  Negative for abdominal pain, blood in stool, constipation, diarrhea, nausea, rectal pain and vomiting.    Endocrine: Negative for cold intolerance, heat intolerance, polydipsia, polyphagia and polyuria.   Genitourinary:  Negative for decreased urine volume, dysuria, frequency, hematuria and urgency.   Musculoskeletal:  Negative for arthralgias, back pain, joint swelling, myalgias and neck pain.   Skin:  Negative for color change, pallor, rash and wound.   Neurological:  Positive for weakness. Negative for dizziness, tremors, seizures, syncope, speech difficulty, light-headedness, numbness and headaches.   Hematological:  Negative for adenopathy. Does not bruise/bleed easily.   Psychiatric/Behavioral:  Negative for confusion, hallucinations, sleep disturbance and suicidal ideas.         Past Medical History  He has a past medical history of Anxiety disorder, unspecified, Atrial fibrillation (CMS/[facility]), CAD (coronary artery disease), CHF (congestive heart failure) (CMS/[facility]), High cholesterol, Hypertension, Long term (current) use of anticoagulants, Other conditions influencing health status, Personal history of other diseases of male genital organs, Personal history of other diseases of the circulatory system (07/26/2013), Personal history of other endocrine, nutritional and metabolic disease (07/26/2013), and Prostate disorder.    Surgical History  He has a past surgical history that includes Umbilical hernia repair (04/08/2013); Other surgical history (04/08/2013); Appendectomy (04/08/2013); and Knee arthroscopy w/ debridement (04/08/2013).     Social History  He reports that he has quit smoking. His smoking use included cigarettes. He smoked an average of .25 packs per day. He has never used smokeless tobacco. He reports that he does not currently use alcohol after a past usage of about 7.0 standard drinks of alcohol per week. He reports that he does not use drugs.    Family History  Family History   Problem Relation Name Age of Onset    Stroke Mother      Dementia Mother      Stroke Father          Current  Facility-Administered Medications:     cefTRIAXone (Rocephin) IVPB 1 g, 1 g, intravenous, q24h, Tarek R Gharibeh, MD, Stopped at 02/13/24 2246    clopidogrel (Plavix) tablet 75 mg, 75 mg, oral, Daily, Jasmeet Jaquez PA-C, 75 mg at 02/14/24 0851    DOBUTamine (Dobutrex) 1,000 mg in dextrose 5% 250 mL (4 mg/mL) infusion (premix), 2.5-5 mcg/kg/min, intravenous, Continuous, Tarek R Gharibeh, MD, Last Rate: 7.73 mL/hr at 02/14/24 0803, 5 mcg/kg/min at 02/14/24 0803    [Held by provider] metoprolol succinate XL (Toprol-XL) 24 hr tablet 100 mg, 100 mg, oral, Daily, Jasmeet Jaquez PA-C    [Held by provider] metoprolol tartrate (Lopressor) tablet 50 mg, 50 mg, oral, TID, Jasmeet Jaquez PA-C    norepinephrine (Levophed) 8 mg in dextrose 5% 250 mL (0.032 mg/mL) infusion (premix), 0.01-0.5 mcg/kg/min, intravenous, Continuous, Jasmeet Jaquez PA-C, Last Rate: 42.9 mL/hr at 02/14/24 0803, 0.21 mcg/kg/min at 02/14/24 0803    oxygen (O2) therapy, , inhalation, Continuous, Tarek R Gharibeh, MD, Start at 02/13/24 1815    pantoprazole (ProtoNix) EC tablet 40 mg, 40 mg, oral, Daily before breakfast **OR** pantoprazole (ProtoNix) injection 40 mg, 40 mg, intravenous, Daily before breakfast, Barber Xie, APRN-CNP, 40 mg at 02/14/24 0507    sennosides-docusate sodium (Katie-Colace) 8.6-50 mg per tablet 2 tablet, 2 tablet, oral, BID, Jasmeet Jaquez PA-C, 2 tablet at 02/14/24 0851    [Held by provider] tamsulosin (Flomax) 24 hr capsule 0.4 mg, 0.4 mg, oral, Daily, Jasmeet Jaquez PA-C   Allergies  Patient has no known allergies.         Physical Exam  Physical Exam  Constitutional:       General: He is not in acute distress.     Appearance: He is not toxic-appearing.   HENT:      Head: Normocephalic and atraumatic.   Eyes:      Extraocular Movements: Extraocular movements intact.      Pupils: Pupils are equal, round, and reactive to light.   Neck:      Vascular: No carotid bruit.   Cardiovascular:      Rate and Rhythm: Normal rate. Rhythm  "irregular.   Pulmonary:      Effort: No respiratory distress.      Breath sounds: No stridor. No wheezing, rhonchi or rales.   Chest:      Chest wall: No tenderness.   Abdominal:      General: There is no distension.      Palpations: There is no mass.      Tenderness: There is no abdominal tenderness. There is no right CVA tenderness, left CVA tenderness or guarding.      Hernia: No hernia is present.   Musculoskeletal:         General: No swelling or tenderness.      Cervical back: No rigidity.      Right lower leg: No edema.      Left lower leg: No edema.   Lymphadenopathy:      Cervical: No cervical adenopathy.   Skin:     General: Skin is warm and dry.      Coloration: Skin is not jaundiced or pale.      Findings: No bruising or erythema.   Neurological:      General: No focal deficit present.      Mental Status: He is alert and oriented to person, place, and time.   Psychiatric:         Mood and Affect: Mood normal.         Behavior: Behavior normal.              I&O 24HR    Intake/Output Summary (Last 24 hours) at 2/14/2024 1034  Last data filed at 2/14/2024 1000  Gross per 24 hour   Intake 1179.94 ml   Output 555 ml   Net 624.94 ml       Vitals 24HR  Heart Rate:  []   Temp:  [35.9 °C (96.6 °F)-36.8 °C (98.2 °F)]   Resp:  [8-49]   BP: ()/(38-99)   Height:  [190.5 cm (6' 3\")]   Weight:  [103 kg (226 lb 6.6 oz)-109 kg (240 lb)]   SpO2:  [83 %-100 %]     Relevant Results        Results for orders placed or performed during the hospital encounter of 02/13/24 (from the past 96 hour(s))   CBC and Auto Differential   Result Value Ref Range    WBC 12.1 (H) 4.4 - 11.3 x10*3/uL    nRBC 0.0 0.0 - 0.0 /100 WBCs    RBC 4.25 (L) 4.50 - 5.90 x10*6/uL    Hemoglobin 14.7 13.5 - 17.5 g/dL    Hematocrit 45.4 41.0 - 52.0 %     (H) 80 - 100 fL    MCH 34.6 (H) 26.0 - 34.0 pg    MCHC 32.4 32.0 - 36.0 g/dL    RDW 14.6 (H) 11.5 - 14.5 %    Platelets 210 150 - 450 x10*3/uL    Neutrophils % 84.7 40.0 - 80.0 %    " Immature Granulocytes %, Automated 0.7 0.0 - 0.9 %    Lymphocytes % 6.1 13.0 - 44.0 %    Monocytes % 8.3 2.0 - 10.0 %    Eosinophils % 0.0 0.0 - 6.0 %    Basophils % 0.2 0.0 - 2.0 %    Neutrophils Absolute 10.22 (H) 1.60 - 5.50 x10*3/uL    Immature Granulocytes Absolute, Automated 0.08 0.00 - 0.50 x10*3/uL    Lymphocytes Absolute 0.74 (L) 0.80 - 3.00 x10*3/uL    Monocytes Absolute 1.00 (H) 0.05 - 0.80 x10*3/uL    Eosinophils Absolute 0.00 0.00 - 0.40 x10*3/uL    Basophils Absolute 0.02 0.00 - 0.10 x10*3/uL   NT Pro-BNP   Result Value Ref Range    PROBNP 50,175 (H) 0 - 852 pg/mL   Blood Gas Lactic Acid, Venous   Result Value Ref Range    POCT Lactate, Venous 5.8 (HH) 0.4 - 2.0 mmol/L   Blood Culture    Specimen: Peripheral Venipuncture; Blood culture   Result Value Ref Range    Blood Culture Loaded on Instrument - Culture in progress    Blood Culture    Specimen: Peripheral Venipuncture; Blood culture   Result Value Ref Range    Blood Culture Loaded on Instrument - Culture in progress    Transthoracic Echo (TTE) Complete   Result Value Ref Range    AV pk rios 2.48 m/s    LVOT diam 2.00 cm    AV mn grad 13.0 mmHg    Tricuspid annular plane systolic excursion 1.2 cm    LV biplane EF 27 %    LA vol index A/L 131.1 ml/m2    RV free wall pk S' 6.20 cm/s    RVSP 56.5 mmHg    LVIDd 6.52 cm    AV pk grad 24.6 mmHg    Aortic Valve Area by Continuity of VTI 0.63 cm2    Aortic Valve Area by Continuity of Peak Velocity 0.74 cm2    LV A4C EF 34.3    Comprehensive Metabolic Panel   Result Value Ref Range    Glucose 120 (H) 65 - 99 mg/dL    Sodium 140 133 - 145 mmol/L    Potassium 4.5 3.4 - 5.1 mmol/L    Chloride 105 97 - 107 mmol/L    Bicarbonate 16 (L) 24 - 31 mmol/L    Urea Nitrogen 77 (H) 8 - 25 mg/dL    Creatinine 2.90 (H) 0.40 - 1.60 mg/dL    eGFR 22 (L) >60 mL/min/1.73m*2    Calcium 7.0 (L) 8.5 - 10.4 mg/dL    Albumin 3.0 (L) 3.5 - 5.0 g/dL    Alkaline Phosphatase 96 35 - 125 U/L    Total Protein 5.3 (L) 5.9 - 7.9 g/dL    AST  1,488 (H) 5 - 40 U/L    Bilirubin, Total 1.9 (H) 0.1 - 1.2 mg/dL    ALT 1,073 (H) 5 - 40 U/L    Anion Gap 19 <=19 mmol/L   Serial Troponin, Initial ([facility])   Result Value Ref Range    Troponin T, High Sensitivity 1,236 (HH) <=14 ng/L   Blood Gas Lactic Acid, Venous   Result Value Ref Range    POCT Lactate, Venous 4.8 (HH) 0.4 - 2.0 mmol/L   Urinalysis with Reflex Culture and Microscopic   Result Value Ref Range    Color, Urine Dark-Yellow Light-Yellow, Yellow, Dark-Yellow    Appearance, Urine Ex.Turbid (N) Clear    Specific Gravity, Urine 1.018 1.005 - 1.035    pH, Urine 5.0 5.0, 5.5, 6.0, 6.5, 7.0, 7.5, 8.0    Protein, Urine 100 (2+) (A) NEGATIVE, 10 (TRACE), 20 (TRACE) mg/dL    Glucose, Urine 50 (TRACE) (A) Normal mg/dL    Blood, Urine OVER (3+) (A) NEGATIVE    Ketones, Urine NEGATIVE NEGATIVE mg/dL    Bilirubin, Urine NEGATIVE NEGATIVE    Urobilinogen, Urine 6 (2+) (A) Normal mg/dL    Nitrite, Urine NEGATIVE NEGATIVE    Leukocyte Esterase, Urine 75 Jillian/µL (A) NEGATIVE   Extra Urine Gray Tube   Result Value Ref Range    Extra Tube Hold for add-ons.    Microscopic Only, Urine   Result Value Ref Range    WBC, Urine 21-50 (A) 1-5, NONE /HPF    RBC, Urine >20 (A) NONE, 1-2, 3-5 /HPF    Squamous Epithelial Cells, Urine 1-9 (SPARSE) Reference range not established. /HPF    Mucus, Urine 2+ Reference range not established. /LPF    Hyaline Casts, Urine 3+ (A) NONE /LPF   Blood Gas Lactic Acid, Venous   Result Value Ref Range    POCT Lactate, Venous 4.8 (HH) 0.4 - 2.0 mmol/L   POCT GLUCOSE   Result Value Ref Range    POCT Glucose 138 (H) 74 - 99 mg/dL   Serial Troponin, 6 Hour ([facility])   Result Value Ref Range    Troponin T, High Sensitivity 1,746 (HH) <=14 ng/L   Blood Gas Arterial Full Panel   Result Value Ref Range    POCT pH, Arterial 7.35 (L) 7.38 - 7.42 pH    POCT pCO2, Arterial 31 (L) 38 - 42 mm Hg    POCT pO2, Arterial 119 (H) 85 - 95 mm Hg    POCT SO2, Arterial 97 94 - 100 %    POCT Oxy Hemoglobin, Arterial 94.2 94.0  - 98.0 %    POCT Hematocrit Calculated, Arterial 46.0 41.0 - 52.0 %    POCT Sodium, Arterial 133 (L) 136 - 145 mmol/L    POCT Potassium, Arterial 5.1 3.5 - 5.3 mmol/L    POCT Chloride, Arterial 100 98 - 107 mmol/L    POCT Ionized Calcium, Arterial 1.07 (L) 1.10 - 1.33 mmol/L    POCT Glucose, Arterial 164 (H) 74 - 99 mg/dL    POCT Lactate, Arterial 3.3 (H) 0.4 - 2.0 mmol/L    POCT Base Excess, Arterial -7.2 (L) -2.0 - 3.0 mmol/L    POCT HCO3 Calculated, Arterial 17.1 (L) 22.0 - 26.0 mmol/L    POCT Hemoglobin, Arterial 15.2 13.5 - 17.5 g/dL    POCT Anion Gap, Arterial 21 10 - 25 mmo/L    Patient Temperature 37.0 degrees Celsius    FiO2 30 %    Ventilator Rate 16 bpm    Ipap CMH2O 14.0 cm H2O    Epap CMH2O 8.0 cm H2O    Site of Arterial Puncture Radial Right     Aftab's Test Positive    Blood Gas Lactic Acid, Venous   Result Value Ref Range    POCT Lactate, Venous 5.1 (HH) 0.4 - 2.0 mmol/L   POCT GLUCOSE   Result Value Ref Range    POCT Glucose 157 (H) 74 - 99 mg/dL   Basic metabolic panel   Result Value Ref Range    Glucose 171 (H) 65 - 99 mg/dL    Sodium 140 133 - 145 mmol/L    Potassium 5.1 3.4 - 5.1 mmol/L    Chloride 99 97 - 107 mmol/L    Bicarbonate 21 (L) 24 - 31 mmol/L    Urea Nitrogen 101 (H) 8 - 25 mg/dL    Creatinine 4.30 (H) 0.40 - 1.60 mg/dL    eGFR 14 (L) >60 mL/min/1.73m*2    Calcium 8.2 (L) 8.5 - 10.4 mg/dL    Anion Gap >19 (H) <=19 mmol/L   Magnesium   Result Value Ref Range    Magnesium 2.90 1.60 - 3.10 mg/dL   CBC   Result Value Ref Range    WBC 11.9 (H) 4.4 - 11.3 x10*3/uL    nRBC 0.0 0.0 - 0.0 /100 WBCs    RBC 3.97 (L) 4.50 - 5.90 x10*6/uL    Hemoglobin 13.7 13.5 - 17.5 g/dL    Hematocrit 41.3 41.0 - 52.0 %     (H) 80 - 100 fL    MCH 34.5 (H) 26.0 - 34.0 pg    MCHC 33.2 32.0 - 36.0 g/dL    RDW 14.5 11.5 - 14.5 %    Platelets 208 150 - 450 x10*3/uL   Blood Gas Arterial Full Panel   Result Value Ref Range    POCT pH, Arterial 7.43 (H) 7.38 - 7.42 pH    POCT pCO2, Arterial 30 (L) 38 - 42 mm Hg     POCT pO2, Arterial 132 (H) 85 - 95 mm Hg    POCT SO2, Arterial 99 94 - 100 %    POCT Oxy Hemoglobin, Arterial 96.3 94.0 - 98.0 %    POCT Hematocrit Calculated, Arterial 44.0 41.0 - 52.0 %    POCT Sodium, Arterial 131 (L) 136 - 145 mmol/L    POCT Potassium, Arterial 4.4 3.5 - 5.3 mmol/L    POCT Chloride, Arterial 101 98 - 107 mmol/L    POCT Ionized Calcium, Arterial 1.06 (L) 1.10 - 1.33 mmol/L    POCT Glucose, Arterial 172 (H) 74 - 99 mg/dL    POCT Lactate, Arterial 1.6 0.4 - 2.0 mmol/L    POCT Base Excess, Arterial -3.2 (L) -2.0 - 3.0 mmol/L    POCT HCO3 Calculated, Arterial 19.9 (L) 22.0 - 26.0 mmol/L    POCT Hemoglobin, Arterial 14.7 13.5 - 17.5 g/dL    POCT Anion Gap, Arterial 15 10 - 25 mmo/L    Patient Temperature 37.0 degrees Celsius    FiO2 30 %    Apparatus FACE MASK     Ventilator Mode BiPAP     Ventilator Rate 16 bpm    Inspiratory Time 0.9     Ipap CMH2O 14.0 cm H2O    Epap CMH2O 8.0 cm H2O    Site of Arterial Puncture Radial Right     Aftab's Test Positive           Assessment/Plan     XR chest 1 view    Result Date: 2/13/2024  Interpreted By:  Nolberto Joy, STUDY: XR CHEST 1 VIEW; 2/13/2024 3:11 pm   INDICATION: Signs/Symptoms:shortness of breath.   COMPARISON: 10/28/2021 and 01/04/2019   ACCESSION NUMBER(S): [id number]   ORDERING CLINICIAN: ADORE RIVERA   TECHNIQUE: 1 view of the chest was performed.   FINDINGS: The lungs are adequately inflated. No acute consolidation. No pleural effusion. No pneumothorax.  The cardiomediastinal silhouette is mildly enlarged.       Mild cardiomegaly. No acute cardiopulmonary disease.   Signed by: Nolberto Joy 2/13/2024 3:46 PM Dictation workstation:   [id number]    Transthoracic Echo (TTE) Complete    Result Date: 2/13/2024           [place] [address]            Phone [phone] TRANSTHORACIC ECHOCARDIOGRAM REPORT  Patient Name:      YAS Ornelas Physician:    [id number] Wayne                                                                 Aleksander HENDERSON Study Date:        2/13/2024             Ordering Provider:    [id number] OPAL NEVAREZ MRN/PID:           [id number]              Fellow: Accession#:        [id number]          Nurse: Date of Birth/Age: 1945 / 78 years  Sonographer:          Rustam Pena RDCS Gender:            M                     Additional Staff: Height:            188.00 cm             Admit Date: Weight:            110.00 kg             Admission Status:     Inpatient - STAT BSA / BMI:         2.36 m2 / 31.12 kg/m2 Department Location:  [facility] Blood Pressure: 112 /101 mmHg Study Type:    TRANSTHORACIC ECHO (TTE) COMPLETE Diagnosis/ICD: Chronic systolic (congestive) heart failure (CHF)-I50.22 Indication:    Congestive Heart Failure CPT Codes:     Echo Complete w Full Doppler-[id number] Patient History: Smoker:            Current. Pertinent History: CAD, Chest Pain, CHF, HTN, Hyperlipidemia, LE Edema, Murmur,                    Dyspnea and Syncope. PCI, HFrEF 40%, Abn Ekg. Study Detail: The following Echo studies were performed: 2D, M-Mode, Doppler and               color flow. Technically challenging study due to poor acoustic               windows and body habitus.  PHYSICIAN INTERPRETATION: Left Ventricle: Left ventricular systolic function is severely decreased, with an estimated ejection fraction of 20-25%. Wall motion is abnormal. The left ventricular cavity size is mildly dilated. Spectral Doppler shows an impaired relaxation pattern of left ventricular diastolic filling. LV Wall Scoring: The RCA distribution, entire apex, basal and mid inferolateral wall, basal anteroseptal segment, and mid anterolateral segment are hypokinetic. All remaining scored segments are normal. Left Atrium: The left atrium is moderately dilated. Right Ventricle: The  right ventricle is slightly enlarged. There is mildly reduced right ventricular systolic function. Right Atrium: The right atrium is moderately dilated. Aortic Valve: The aortic valve is trileaflet. There is no evidence of aortic valve regurgitation. The peak instantaneous gradient of the aortic valve is 24.6 mmHg. The mean gradient of the aortic valve is 13.0 mmHg. Mitral Valve: The mitral valve is normal in structure. There is moderate mitral valve regurgitation. Tricuspid Valve: The tricuspid valve is structurally normal. There is mild to moderate tricuspid regurgitation. The Doppler estimated RVSP is moderately elevated at 56.5 mmHg. Pulmonic Valve: The pulmonic valve is not well visualized. There is no indication of pulmonic valve regurgitation. Pericardium: There is no pericardial effusion noted. Aorta: The aortic root is normal.  CONCLUSIONS:  1. Left ventricular systolic function is severely decreased with a 20-25% estimated ejection fraction.  2. Multiple segmental abnormalities exist. See findings.  3. Spectral Doppler shows an impaired relaxation pattern of left ventricular diastolic filling.  4. There is mildly reduced right ventricular systolic function.  5. The left atrium is moderately dilated.  6. The right atrium is moderately dilated.  7. Moderate mitral valve regurgitation.  8. Mild to moderate tricuspid regurgitation.  9. Moderately elevated right ventricular systolic pressure. QUANTITATIVE DATA SUMMARY: 2D MEASUREMENTS:                           Normal Ranges: LAs:           6.70 cm    (2.7-4.0cm) IVSd:          0.98 cm    (0.6-1.1cm) LVPWd:         0.89 cm    (0.6-1.1cm) LVIDd:         6.52 cm    (3.9-5.9cm) LVIDs:         5.67 cm LV Mass Index: 111.3 g/m2 LV % FS        13.0 % LA VOLUME:                                Normal Ranges: LA Vol A4C:        316.5 ml    (22+/-6mL/m2) LA Vol A2C:        218.3 ml LA Vol BP:         309.3 ml LA Vol Index A4C:  134.2ml/m2 LA Vol Index A2C:  92.5 ml/m2 LA  Vol Index BP:   131.1 ml/m2 LA Area A4C:       44.0 cm2 LA Area A2C:       43.0 cm2 LA Major Axis A4C: 5.2 cm LA Major Axis A2C: 7.2 cm LA Volume Index:   89.0 ml/m2 LA Vol A4C:        190.0 ml LA Vol A2C:        205.0 ml RA VOLUME BY A/L METHOD:                       Normal Ranges: RA Area A4C: 34.0 cm2 AORTA MEASUREMENTS:                    Normal Ranges: Asc Ao, d: 3.40 cm (2.1-3.4cm) LV SYSTOLIC FUNCTION BY 2D PLANIMETRY (MOD):                     Normal Ranges: EF-A4C View: 34.3 % (>=55%) EF-A2C View: 23.4 % EF-Biplane:  26.5 % LV DIASTOLIC FUNCTION:                     Normal Ranges: MV Peak E: 0.96 m/s (0.7-1.2 m/s) MITRAL VALVE:                 Normal Ranges: MV DT: 194 msec (150-240msec) MITRAL INSUFFICIENCY:                           Normal Ranges: PISA Radius:  0.7 cm MR VTI:       93.10 cm MR Vmax:      380.00 cm/s MR Alias Murtaza: 38.5 cm/s MR Volume:    29.04 ml MR Flow Rt:   118.53 ml/s MR EROA:      0.31 cm2 AORTIC VALVE:                                    Normal Ranges: AoV Vmax:                2.48 m/s  (<=1.7m/s) AoV Peak P.6 mmHg (<20mmHg) AoV Mean P.0 mmHg (1.7-11.5mmHg) LVOT Max Murtaza:            0.59 m/s  (<=1.1m/s) AoV VTI:                 44.40 cm  (18-25cm) LVOT VTI:                8.97 cm LVOT Diameter:           2.00 cm   (1.8-2.4cm) AoV Area, VTI:           0.63 cm2  (2.5-5.5cm2) AoV Area,Vmax:           0.74 cm2  (2.5-4.5cm2) AoV Dimensionless Index: 0.20 AORTIC INSUFFICIENCY: AI Vmax:       2.62 m/s AI Half-time:  482 msec AI Decel Rate: 141.00 cm/s2  RIGHT VENTRICLE: RV Basal 5.50 cm RV Mid   4.42 cm RV Major 6.9 cm TAPSE:   12.4 mm RV s'    0.06 m/s TRICUSPID VALVE/RVSP:                             Normal Ranges: Peak TR Velocity: 3.22 m/s RV Syst Pressure: 56.5 mmHg (< 30mmHg) IVC Diam:         2.59 cm PULMONIC VALVE:                      Normal Ranges: PV Max Murtaza: 1.3 m/s  (0.6-0.9m/s) PV Max P.0 mmHg  [id number] Wayne Armijo MD Electronically  signed on 2/13/2024 at 2:48:39 PM  Wall Scoring  ** Final **     Impression:  Acute kidney injury I believe this is most likely secondary to shock and hypotension cardiorenal syndrome with severely diminished ejection fraction  Hypotension secondary to cardiogenic shock  High anion gap metabolic acidosis secondary to lactic acidosis because of hypoperfusion and shock  Cardiomyopathy new ejection fraction 20 to 25%  Abnormal liver function tests most likely secondary to passive congestion  Coronary artery disease status post stenting  Chronic atrial fibrillation  History of hypertension and hyperlipidemia    Recommendations:  I agree with the current management with dobutamine and small dose of norepinephrine it seemed that his urine output just started to  this morning even though history renal function is much worse comparing to yesterday  I do not see any indication for dialysis or ultrafiltration at this time  Obtain urinalysis  Obtain ultrasound of the kidneys monitor renal functio, I's and O's and avoid nephrotoxic medications    Thank you very much Dr. Dunne for your consultation I will be following the patient along with you    Eliceo Lomax MD   Complex Repair And Split-Thickness Skin Graft Text: The defect edges were debeveled with a #15 scalpel blade.  The primary defect was closed partially with a complex linear closure.  Given the location of the defect, shape of the defect and the proximity to free margins a split thickness skin graft was deemed most appropriate to repair the remaining defect.  The graft was trimmed to fit the size of the remaining defect.  The graft was then placed in the primary defect, oriented appropriately, and sutured into place.

## 2024-02-14 NOTE — PROGRESS NOTES
TCC called patient's spouse, Misti, and left a message for her to return my call so that assessment can be completed.       Peggy Vuong RN

## 2024-02-14 NOTE — PROGRESS NOTES
Subjective Data:   patient is feeling much better today.  He is less short of breath he remains on oxygen supplementation via nasal cannula.  Has not made much urine since last night remains on dobutamine at 5 mics per kilogram per minute and on IV Levophed    Overnight Events:    none     Objective Data:  Last Recorded Vitals:  Vitals:    02/14/24 0732 02/14/24 0745 02/14/24 0800 02/14/24 0811   BP:  (!) 117/99 105/75    Pulse:  92 96    Resp: 24 18 17    Temp:       TempSrc:       SpO2:  97% 97% 96%   Weight:       Height:           Last Labs:  CBC - 2/14/2024:  4:28 AM  11.9 13.7 208    41.3      CMP - 2/14/2024:  4:27 AM  8.2 5.3 1,488 --- 1.9   _ 3.0 1,073 96      PTT - No results in last year.  _   _ _     HGBA1C   Date/Time Value Ref Range Status   07/09/2021 09:12 AM 5.2 % Final     Comment:          Diagnosis of Diabetes-Adults   Non-Diabetic: < or = 5.6%   Increased risk for developing diabetes: 5.7-6.4%   Diagnostic of diabetes: > or = 6.5%  .       Monitoring of Diabetes                Age (y)     Therapeutic Goal (%)   Adults:          >18           <7.0   Pediatrics:    13-18           <7.5                   7-12           <8.0                   0- 6            7.5-8.5   American Diabetes Association. Diabetes Care 33(S1), Jan 2010.       LDLCALC   Date/Time Value Ref Range Status   12/15/2023 10:28 AM 82 <=99 mg/dL Final     Comment:                                 Near   Borderline      AGE      Desirable  Optimal    High     High     Very High     0-19 Y     0 - 109     ---    110-129   >/= 130     ----    20-24 Y     0 - 119     ---    120-159   >/= 160     ----      >24 Y     0 -  99   100-129  130-159   160-189     >/=190     05/09/2020 04:24  65 - 130 MG/DL Final     VLDL   Date/Time Value Ref Range Status   12/15/2023 10:28 AM 17 0 - 40 mg/dL Final   09/20/2023 09:51 AM 14 0 - 40 mg/dL Final   06/30/2022 09:30 AM 19 0 - 40 mg/dL Final   01/18/2022 12:50 PM 21 0 - 40 mg/dL Final      Last  I/O:  I/O last 3 completed shifts:  In: 1029.7 (9.5 mL/kg) [I.V.:604.7 (5.6 mL/kg); IV Piggyback:425]  Out: 300 (2.8 mL/kg) [Urine:300 (0.1 mL/kg/hr)]  Weight: 108.2 kg     Past Cardiology Tests (Last 3 Years):  EKG:  ECG 12 Lead     Echo:  Transthoracic Echo (TTE) Complete 02/13/2024    Ejection Fractions:  EF   Date/Time Value Ref Range Status   02/13/2024 02:34 PM 27 %      Cath:  No results found for this or any previous visit from the past 1095 days.    Stress Test:  No results found for this or any previous visit from the past 1095 days.    Cardiac Imaging:  No results found for this or any previous visit from the past 1095 days.      Inpatient Medications:  Scheduled medications   Medication Dose Route Frequency    cefTRIAXone  1 g intravenous q24h    clopidogrel  75 mg oral Daily    [Held by provider] metoprolol succinate XL  100 mg oral Daily    [Held by provider] metoprolol tartrate  50 mg oral TID    pantoprazole  40 mg oral Daily before breakfast    Or    pantoprazole  40 mg intravenous Daily before breakfast    sennosides-docusate sodium  2 tablet oral BID    [Held by provider] tamsulosin  0.4 mg oral Daily     PRN medications   Medication     Continuous Medications   Medication Dose Last Rate    DOBUTamine  2.5-5 mcg/kg/min 5 mcg/kg/min (02/14/24 0803)    norepinephrine  0.01-0.5 mcg/kg/min 0.21 mcg/kg/min (02/14/24 0803)    oxygen           Physical Exam:   HEENT PERRLA neck is supple lungs few rhonchi appreciated posteriorly bilaterally heart S1-S2 present no murmurs abdomen soft obese nontender extremity shows no significant  pedal edema     Assessment/Plan   1. cardiogenic shock with acute on chronic LV systolic dysfunction.  Ejection fraction was 20 to 25%.  This put him into acute to necrosis and did not oliguric renal failure.  Patient has been tolerating dobutamine at 5 mics per kilogram per minute with no ventricular arrhythmias.  Will continue to support him and hopefully in the next 24 to 48  hours his renal function will start improving after plateau phase. As expected his renal function is worse than yesterday.  2. CAD-His HS troponins are elevated. Will start pt on IV heparin low intensity protocol and continue patient on plavix and ASA.  3, chronic atrial fibrillation with rate control strategy-was on xarelto which will be resumed after the workup for CAD is completed and may need to change to eliquis given his JEFF.  4. Copd-stable  Discussed with intensivist and informed the patient that if he does not respond as expected we may consider transferring to tertiary center for further management.   Will discuss with his wife later in the day    Peripheral IV 02/13/24 20 G Left Forearm (Active)   Site Assessment Clean;Dry;Intact 02/14/24 0855   Dressing Status Clean;Dry;Occlusive 02/14/24 0855   Number of days: 1       Peripheral IV 02/13/24 20 G Proximal;Right Forearm (Active)   Site Assessment Clean;Dry;Intact 02/14/24 0855   Dressing Status Clean;Dry;Occlusive 02/14/24 0855   Number of days: 1       Urethral Catheter 16 Fr. (Active)   Output (mL) 90 mL 02/14/24 0800   Number of days: 1       Code Status:  Full Code    I spent 30 minutes in the professional and overall care of this patient.        Wayne Armijo MD

## 2024-02-14 NOTE — PROGRESS NOTES
"Tesfaye Milton is a 78 y.o. male on day 1 of admission presenting with Chronic systolic (congestive) heart failure (CMS/HCC).    Subjective   Is doing well off BiPAP this morning not having any fever no chest pain mild shortness of breath occasional cough no sputum production     Objective     Physical Exam     General appears the patient looks in mild distress [place] tachypneic  Head and neck examination shows dry mucous membrane JVP is elevated he is slightly pale  Neck also showed that he has central tracking  Lung examination shows diffuse wheezes bilaterally with bibasilar crackles  Cardiac examination showed a regular rhythm with S3 and systolic ejection murmur.  Abdomen obese with a fluid thrill consistent with ascites organomegaly could not be felt but soft nontender  Edema lower extremity was noted with signs of chronic venous insufficiency  Neurologically he is alert oriented conscious x 4 without focal deficit.  Musculoskeletal there is no obvious deformity    Last Recorded Vitals  Blood pressure 105/75, pulse 96, temperature 35.9 °C (96.6 °F), temperature source Temporal, resp. rate 17, height 1.905 m (6' 3\"), weight 108 kg (238 lb 8.6 oz), SpO2 96 %.  Intake/Output last 3 Shifts:  I/O last 3 completed shifts:  In: 1029.7 (9.5 mL/kg) [I.V.:604.7 (5.6 mL/kg); IV Piggyback:425]  Out: 300 (2.8 mL/kg) [Urine:300 (0.1 mL/kg/hr)]  Weight: 108.2 kg     Relevant Results  Scheduled medications  cefTRIAXone, 1 g, intravenous, q24h  clopidogrel, 75 mg, oral, Daily  [Held by provider] metoprolol succinate XL, 100 mg, oral, Daily  [Held by provider] metoprolol tartrate, 50 mg, oral, TID  pantoprazole, 40 mg, oral, Daily before breakfast   Or  pantoprazole, 40 mg, intravenous, Daily before breakfast  sennosides-docusate sodium, 2 tablet, oral, BID  [Held by provider] tamsulosin, 0.4 mg, oral, Daily      Continuous medications  DOBUTamine, 2.5-5 mcg/kg/min, Last Rate: 5 mcg/kg/min (02/14/24 0803)  norepinephrine, " 0.01-0.5 mcg/kg/min, Last Rate: 0.21 mcg/kg/min (02/14/24 1035)  oxygen,       PRN medications  Results for orders placed or performed during the hospital encounter of 02/13/24 (from the past 24 hour(s))   CBC and Auto Differential   Result Value Ref Range    WBC 12.1 (H) 4.4 - 11.3 x10*3/uL    nRBC 0.0 0.0 - 0.0 /100 WBCs    RBC 4.25 (L) 4.50 - 5.90 x10*6/uL    Hemoglobin 14.7 13.5 - 17.5 g/dL    Hematocrit 45.4 41.0 - 52.0 %     (H) 80 - 100 fL    MCH 34.6 (H) 26.0 - 34.0 pg    MCHC 32.4 32.0 - 36.0 g/dL    RDW 14.6 (H) 11.5 - 14.5 %    Platelets 210 150 - 450 x10*3/uL    Neutrophils % 84.7 40.0 - 80.0 %    Immature Granulocytes %, Automated 0.7 0.0 - 0.9 %    Lymphocytes % 6.1 13.0 - 44.0 %    Monocytes % 8.3 2.0 - 10.0 %    Eosinophils % 0.0 0.0 - 6.0 %    Basophils % 0.2 0.0 - 2.0 %    Neutrophils Absolute 10.22 (H) 1.60 - 5.50 x10*3/uL    Immature Granulocytes Absolute, Automated 0.08 0.00 - 0.50 x10*3/uL    Lymphocytes Absolute 0.74 (L) 0.80 - 3.00 x10*3/uL    Monocytes Absolute 1.00 (H) 0.05 - 0.80 x10*3/uL    Eosinophils Absolute 0.00 0.00 - 0.40 x10*3/uL    Basophils Absolute 0.02 0.00 - 0.10 x10*3/uL   NT Pro-BNP   Result Value Ref Range    PROBNP 50,175 (H) 0 - 852 pg/mL   Blood Gas Lactic Acid, Venous   Result Value Ref Range    POCT Lactate, Venous 5.8 (HH) 0.4 - 2.0 mmol/L   Blood Culture    Specimen: Peripheral Venipuncture; Blood culture   Result Value Ref Range    Blood Culture Loaded on Instrument - Culture in progress    Blood Culture    Specimen: Peripheral Venipuncture; Blood culture   Result Value Ref Range    Blood Culture Loaded on Instrument - Culture in progress    Transthoracic Echo (TTE) Complete   Result Value Ref Range    AV pk rios 2.48 m/s    LVOT diam 2.00 cm    AV mn grad 13.0 mmHg    Tricuspid annular plane systolic excursion 1.2 cm    LV biplane EF 27 %    LA vol index A/L 131.1 ml/m2    RV free wall pk S' 6.20 cm/s    RVSP 56.5 mmHg    LVIDd 6.52 cm    AV pk grad 24.6 mmHg     Aortic Valve Area by Continuity of VTI 0.63 cm2    Aortic Valve Area by Continuity of Peak Velocity 0.74 cm2    LV A4C EF 34.3    Comprehensive Metabolic Panel   Result Value Ref Range    Glucose 120 (H) 65 - 99 mg/dL    Sodium 140 133 - 145 mmol/L    Potassium 4.5 3.4 - 5.1 mmol/L    Chloride 105 97 - 107 mmol/L    Bicarbonate 16 (L) 24 - 31 mmol/L    Urea Nitrogen 77 (H) 8 - 25 mg/dL    Creatinine 2.90 (H) 0.40 - 1.60 mg/dL    eGFR 22 (L) >60 mL/min/1.73m*2    Calcium 7.0 (L) 8.5 - 10.4 mg/dL    Albumin 3.0 (L) 3.5 - 5.0 g/dL    Alkaline Phosphatase 96 35 - 125 U/L    Total Protein 5.3 (L) 5.9 - 7.9 g/dL    AST 1,488 (H) 5 - 40 U/L    Bilirubin, Total 1.9 (H) 0.1 - 1.2 mg/dL    ALT 1,073 (H) 5 - 40 U/L    Anion Gap 19 <=19 mmol/L   Serial Troponin, Initial ([facility])   Result Value Ref Range    Troponin T, High Sensitivity 1,236 (HH) <=14 ng/L   Blood Gas Lactic Acid, Venous   Result Value Ref Range    POCT Lactate, Venous 4.8 (HH) 0.4 - 2.0 mmol/L   Urinalysis with Reflex Culture and Microscopic   Result Value Ref Range    Color, Urine Dark-Yellow Light-Yellow, Yellow, Dark-Yellow    Appearance, Urine Ex.Turbid (N) Clear    Specific Gravity, Urine 1.018 1.005 - 1.035    pH, Urine 5.0 5.0, 5.5, 6.0, 6.5, 7.0, 7.5, 8.0    Protein, Urine 100 (2+) (A) NEGATIVE, 10 (TRACE), 20 (TRACE) mg/dL    Glucose, Urine 50 (TRACE) (A) Normal mg/dL    Blood, Urine OVER (3+) (A) NEGATIVE    Ketones, Urine NEGATIVE NEGATIVE mg/dL    Bilirubin, Urine NEGATIVE NEGATIVE    Urobilinogen, Urine 6 (2+) (A) Normal mg/dL    Nitrite, Urine NEGATIVE NEGATIVE    Leukocyte Esterase, Urine 75 Jillian/µL (A) NEGATIVE   Extra Urine Gray Tube   Result Value Ref Range    Extra Tube Hold for add-ons.    Microscopic Only, Urine   Result Value Ref Range    WBC, Urine 21-50 (A) 1-5, NONE /HPF    RBC, Urine >20 (A) NONE, 1-2, 3-5 /HPF    Squamous Epithelial Cells, Urine 1-9 (SPARSE) Reference range not established. /HPF    Mucus, Urine 2+ Reference range not  established. /LPF    Hyaline Casts, Urine 3+ (A) NONE /LPF   Blood Gas Lactic Acid, Venous   Result Value Ref Range    POCT Lactate, Venous 4.8 (HH) 0.4 - 2.0 mmol/L   POCT GLUCOSE   Result Value Ref Range    POCT Glucose 138 (H) 74 - 99 mg/dL   Serial Troponin, 6 Hour ([facility])   Result Value Ref Range    Troponin T, High Sensitivity 1,746 (HH) <=14 ng/L   Blood Gas Arterial Full Panel   Result Value Ref Range    POCT pH, Arterial 7.35 (L) 7.38 - 7.42 pH    POCT pCO2, Arterial 31 (L) 38 - 42 mm Hg    POCT pO2, Arterial 119 (H) 85 - 95 mm Hg    POCT SO2, Arterial 97 94 - 100 %    POCT Oxy Hemoglobin, Arterial 94.2 94.0 - 98.0 %    POCT Hematocrit Calculated, Arterial 46.0 41.0 - 52.0 %    POCT Sodium, Arterial 133 (L) 136 - 145 mmol/L    POCT Potassium, Arterial 5.1 3.5 - 5.3 mmol/L    POCT Chloride, Arterial 100 98 - 107 mmol/L    POCT Ionized Calcium, Arterial 1.07 (L) 1.10 - 1.33 mmol/L    POCT Glucose, Arterial 164 (H) 74 - 99 mg/dL    POCT Lactate, Arterial 3.3 (H) 0.4 - 2.0 mmol/L    POCT Base Excess, Arterial -7.2 (L) -2.0 - 3.0 mmol/L    POCT HCO3 Calculated, Arterial 17.1 (L) 22.0 - 26.0 mmol/L    POCT Hemoglobin, Arterial 15.2 13.5 - 17.5 g/dL    POCT Anion Gap, Arterial 21 10 - 25 mmo/L    Patient Temperature 37.0 degrees Celsius    FiO2 30 %    Ventilator Rate 16 bpm    Ipap CMH2O 14.0 cm H2O    Epap CMH2O 8.0 cm H2O    Site of Arterial Puncture Radial Right     Aftab's Test Positive    Blood Gas Lactic Acid, Venous   Result Value Ref Range    POCT Lactate, Venous 5.1 (HH) 0.4 - 2.0 mmol/L   POCT GLUCOSE   Result Value Ref Range    POCT Glucose 157 (H) 74 - 99 mg/dL   Basic metabolic panel   Result Value Ref Range    Glucose 171 (H) 65 - 99 mg/dL    Sodium 140 133 - 145 mmol/L    Potassium 5.1 3.4 - 5.1 mmol/L    Chloride 99 97 - 107 mmol/L    Bicarbonate 21 (L) 24 - 31 mmol/L    Urea Nitrogen 101 (H) 8 - 25 mg/dL    Creatinine 4.30 (H) 0.40 - 1.60 mg/dL    eGFR 14 (L) >60 mL/min/1.73m*2    Calcium 8.2 (L)  8.5 - 10.4 mg/dL    Anion Gap >19 (H) <=19 mmol/L   Magnesium   Result Value Ref Range    Magnesium 2.90 1.60 - 3.10 mg/dL   CBC   Result Value Ref Range    WBC 11.9 (H) 4.4 - 11.3 x10*3/uL    nRBC 0.0 0.0 - 0.0 /100 WBCs    RBC 3.97 (L) 4.50 - 5.90 x10*6/uL    Hemoglobin 13.7 13.5 - 17.5 g/dL    Hematocrit 41.3 41.0 - 52.0 %     (H) 80 - 100 fL    MCH 34.5 (H) 26.0 - 34.0 pg    MCHC 33.2 32.0 - 36.0 g/dL    RDW 14.5 11.5 - 14.5 %    Platelets 208 150 - 450 x10*3/uL   Blood Gas Arterial Full Panel   Result Value Ref Range    POCT pH, Arterial 7.43 (H) 7.38 - 7.42 pH    POCT pCO2, Arterial 30 (L) 38 - 42 mm Hg    POCT pO2, Arterial 132 (H) 85 - 95 mm Hg    POCT SO2, Arterial 99 94 - 100 %    POCT Oxy Hemoglobin, Arterial 96.3 94.0 - 98.0 %    POCT Hematocrit Calculated, Arterial 44.0 41.0 - 52.0 %    POCT Sodium, Arterial 131 (L) 136 - 145 mmol/L    POCT Potassium, Arterial 4.4 3.5 - 5.3 mmol/L    POCT Chloride, Arterial 101 98 - 107 mmol/L    POCT Ionized Calcium, Arterial 1.06 (L) 1.10 - 1.33 mmol/L    POCT Glucose, Arterial 172 (H) 74 - 99 mg/dL    POCT Lactate, Arterial 1.6 0.4 - 2.0 mmol/L    POCT Base Excess, Arterial -3.2 (L) -2.0 - 3.0 mmol/L    POCT HCO3 Calculated, Arterial 19.9 (L) 22.0 - 26.0 mmol/L    POCT Hemoglobin, Arterial 14.7 13.5 - 17.5 g/dL    POCT Anion Gap, Arterial 15 10 - 25 mmo/L    Patient Temperature 37.0 degrees Celsius    FiO2 30 %    Apparatus FACE MASK     Ventilator Mode BiPAP     Ventilator Rate 16 bpm    Inspiratory Time 0.9     Ipap CMH2O 14.0 cm H2O    Epap CMH2O 8.0 cm H2O    Site of Arterial Puncture Radial Right     Aftab's Test Positive      XR chest 1 view    Result Date: 2/13/2024  Interpreted By:  Nolberto Joy, STUDY: XR CHEST 1 VIEW; 2/13/2024 3:11 pm   INDICATION: Signs/Symptoms:shortness of breath.   COMPARISON: 10/28/2021 and 01/04/2019   ACCESSION NUMBER(S): [id number]   ORDERING CLINICIAN: ADORE RIVERA   TECHNIQUE: 1 view of the chest was performed.    FINDINGS: The lungs are adequately inflated. No acute consolidation. No pleural effusion. No pneumothorax.  The cardiomediastinal silhouette is mildly enlarged.       Mild cardiomegaly. No acute cardiopulmonary disease.   Signed by: Nolberto Joy 2/13/2024 3:46 PM Dictation workstation:   [id number]    Transthoracic Echo (TTE) Complete    Result Date: 2/13/2024           [place] [id number]            Phone [phone] TRANSTHORACIC ECHOCARDIOGRAM REPORT  Patient Name:      YAS DORANTES         Reading Physician:    [id number] Wayne Armijo MD Study Date:        2/13/2024             Ordering Provider:    [id number] OPAL NEVAREZ MRN/PID:           [id number]              Fellow: Accession#:        [id number]          Nurse: Date of Birth/Age: 1945 / 78 years  Sonographer:          Rustam Pena RDCS Gender:            M                     Additional Staff: Height:            188.00 cm             Admit Date: Weight:            110.00 kg             Admission Status:     Inpatient - STAT BSA / BMI:         2.36 m2 / 31.12 kg/m2 Department Location:  [facility] Blood Pressure: 112 /101 mmHg Study Type:    TRANSTHORACIC ECHO (TTE) COMPLETE Diagnosis/ICD: Chronic systolic (congestive) heart failure (CHF)-I50.22 Indication:    Congestive Heart Failure CPT Codes:     Echo Complete w Full [id number] Patient History: Smoker:            Current. Pertinent History: CAD, Chest Pain, CHF, HTN, Hyperlipidemia, LE Edema, Murmur,                    Dyspnea and Syncope. PCI, HFrEF 40%, Abn Ekg. Study Detail: The following Echo studies were performed: 2D, M-Mode, Doppler and               color flow. Technically challenging study due to poor acoustic               windows and body  habitus.  PHYSICIAN INTERPRETATION: Left Ventricle: Left ventricular systolic function is severely decreased, with an estimated ejection fraction of 20-25%. Wall motion is abnormal. The left ventricular cavity size is mildly dilated. Spectral Doppler shows an impaired relaxation pattern of left ventricular diastolic filling. LV Wall Scoring: The RCA distribution, entire apex, basal and mid inferolateral wall, basal anteroseptal segment, and mid anterolateral segment are hypokinetic. All remaining scored segments are normal. Left Atrium: The left atrium is moderately dilated. Right Ventricle: The right ventricle is slightly enlarged. There is mildly reduced right ventricular systolic function. Right Atrium: The right atrium is moderately dilated. Aortic Valve: The aortic valve is trileaflet. There is no evidence of aortic valve regurgitation. The peak instantaneous gradient of the aortic valve is 24.6 mmHg. The mean gradient of the aortic valve is 13.0 mmHg. Mitral Valve: The mitral valve is normal in structure. There is moderate mitral valve regurgitation. Tricuspid Valve: The tricuspid valve is structurally normal. There is mild to moderate tricuspid regurgitation. The Doppler estimated RVSP is moderately elevated at 56.5 mmHg. Pulmonic Valve: The pulmonic valve is not well visualized. There is no indication of pulmonic valve regurgitation. Pericardium: There is no pericardial effusion noted. Aorta: The aortic root is normal.  CONCLUSIONS:  1. Left ventricular systolic function is severely decreased with a 20-25% estimated ejection fraction.  2. Multiple segmental abnormalities exist. See findings.  3. Spectral Doppler shows an impaired relaxation pattern of left ventricular diastolic filling.  4. There is mildly reduced right ventricular systolic function.  5. The left atrium is moderately dilated.  6. The right atrium is moderately dilated.  7. Moderate mitral valve regurgitation.  8. Mild to moderate tricuspid  regurgitation.  9. Moderately elevated right ventricular systolic pressure. QUANTITATIVE DATA SUMMARY: 2D MEASUREMENTS:                           Normal Ranges: LAs:           6.70 cm    (2.7-4.0cm) IVSd:          0.98 cm    (0.6-1.1cm) LVPWd:         0.89 cm    (0.6-1.1cm) LVIDd:         6.52 cm    (3.9-5.9cm) LVIDs:         5.67 cm LV Mass Index: 111.3 g/m2 LV % FS        13.0 % LA VOLUME:                                Normal Ranges: LA Vol A4C:        316.5 ml    (22+/-6mL/m2) LA Vol A2C:        218.3 ml LA Vol BP:         309.3 ml LA Vol Index A4C:  134.2ml/m2 LA Vol Index A2C:  92.5 ml/m2 LA Vol Index BP:   131.1 ml/m2 LA Area A4C:       44.0 cm2 LA Area A2C:       43.0 cm2 LA Major Axis A4C: 5.2 cm LA Major Axis A2C: 7.2 cm LA Volume Index:   89.0 ml/m2 LA Vol A4C:        190.0 ml LA Vol A2C:        205.0 ml RA VOLUME BY A/L METHOD:                       Normal Ranges: RA Area A4C: 34.0 cm2 AORTA MEASUREMENTS:                    Normal Ranges: Asc Ao, d: 3.40 cm (2.1-3.4cm) LV SYSTOLIC FUNCTION BY 2D PLANIMETRY (MOD):                     Normal Ranges: EF-A4C View: 34.3 % (>=55%) EF-A2C View: 23.4 % EF-Biplane:  26.5 % LV DIASTOLIC FUNCTION:                     Normal Ranges: MV Peak E: 0.96 m/s (0.7-1.2 m/s) MITRAL VALVE:                 Normal Ranges: MV DT: 194 msec (150-240msec) MITRAL INSUFFICIENCY:                           Normal Ranges: PISA Radius:  0.7 cm MR VTI:       93.10 cm MR Vmax:      380.00 cm/s MR Alias Murtaza: 38.5 cm/s MR Volume:    29.04 ml MR Flow Rt:   118.53 ml/s MR EROA:      0.31 cm2 AORTIC VALVE:                                    Normal Ranges: AoV Vmax:                2.48 m/s  (<=1.7m/s) AoV Peak P.6 mmHg (<20mmHg) AoV Mean P.0 mmHg (1.7-11.5mmHg) LVOT Max Murtaza:            0.59 m/s  (<=1.1m/s) AoV VTI:                 44.40 cm  (18-25cm) LVOT VTI:                8.97 cm LVOT Diameter:           2.00 cm   (1.8-2.4cm) AoV Area, VTI:           0.63 cm2   (2.5-5.5cm2) AoV Area,Vmax:           0.74 cm2  (2.5-4.5cm2) AoV Dimensionless Index: 0.20 AORTIC INSUFFICIENCY: AI Vmax:       2.62 m/s AI Half-time:  482 msec AI Decel Rate: 141.00 cm/s2  RIGHT VENTRICLE: RV Basal 5.50 cm RV Mid   4.42 cm RV Major 6.9 cm TAPSE:   12.4 mm RV s'    0.06 m/s TRICUSPID VALVE/RVSP:                             Normal Ranges: Peak TR Velocity: 3.22 m/s RV Syst Pressure: 56.5 mmHg (< 30mmHg) IVC Diam:         2.59 cm PULMONIC VALVE:                      Normal Ranges: PV Max Murtaza: 1.3 m/s  (0.6-0.9m/s) PV Max P.0 mmHg  [id number] Wayne Armijo MD Electronically signed on 2024 at 2:48:39 PM  Wall Scoring  ** Final **                                 Assessment/Plan   Principal Problem:    Chronic systolic (congestive) heart failure (CMS/HCC)    Shock likely cardiogenic bedside echocardiogram showed ejection fraction 25% he has history of ischemic cardiomyopathy cardiology will be following he is currently in Levophed.  The cause of current decompensation could be acute acute ischemic event versus infection.  Positive troponin ischemia versus decompensated heart failure will continue antiplatelet statin and cardiology is following  Acute on chronic renal failure likely cardiorenal we will keep mean arterial pressure more than 65 and monitor urine output    Elevated liver enzymes likely due to liver congestion secondary to right-sided heart failure we will trend liver enzymes we will hold statin due to the hepatotoxic effect.    Possible UTI the patient has no active symptoms we will start ceftriaxone empirically until infection is ruled out      Critical care time is 35 minutes              I spent 35 minutes in the professional and overall care of this patient.      Tarek R Gharibeh, MD

## 2024-02-14 NOTE — NURSING NOTE
Wound Wednesday.  Pt on progressa bed with waffle overlay on.  Pt has sacral border and val ankle borders in place.  SCD's on bilateral legs.  Pt's skin intact .  Pt has a bruise above his coccyx and has 3 small scabbed areas on left lower shin.  Pt is able to turn self and nursing can assist with turns.  Call light within reach.

## 2024-02-14 NOTE — CARE PLAN
The patient's goals for the shift include      The clinical goals for the shift include pt to remain hemodynamically stable    Over the shift, the patient did not make progress toward the following goals. Barriers to progression include . Recommendations to address these barriers include .

## 2024-02-14 NOTE — CONSULTS
"Nutrition Assessement Note  Admitted w/SOB, hypotensive. EF 20-25%. Patient sleeping soundly at time of visit. Spoke with RN. Patient NPO for BiPap overnight. Will likely advance diet today. Patient not appropriate for diet education at this time.    Nutrition Assessment    Reason for Assessment: Dietitian discretion (CHF)    Reason for Hospital Admission:  Tesfaye Milton is a 78 y.o. male who is admitted for CHF.    Nutrition History:  Food and Nutrient History: NPO     Food Allergies/Intolerances:  None  GI Symptoms: None  Oral Problems: None    Anthropometrics:  Ht: 190.5 cm (6' 3\"), Wt: 108 kg (238 lb 8.6 oz), BMI: 29.82  IBW/kg (Dietitian Calculated): 89.09 kg  Percent of IBW: 121.4 %     Weight Change:  Daily Weight  02/14/24 : 108 kg (238 lb 8.6 oz)  01/03/24 : 112 kg (246 lb)  10/16/23 : 113 kg (250 lb)  09/25/23 : 113 kg (250 lb)  08/09/23 : 113 kg (249 lb)  07/05/23 : 112 kg (248 lb)  11/15/22 : 98.9 kg (218 lb)  07/19/22 : 104 kg (229 lb)  07/05/22 : 106 kg (234 lb 8 oz)  02/18/22 : 108 kg (238 lb)     Nutrition Focused Physical Exam Findings:   Subcutaneous Fat Loss  Orbital Fat Pads: Defer  Buccal Fat Pads: Defer  Triceps: Defer  Ribs: Defer    Muscle Wasting  Temporalis: Defer  Pectoralis (Clavicular Region): Defer  Deltoid/Trapezius: Defer  Interosseous: Defer  Trapezius/Infraspinatus/Supraspinatus (Scapular Region): Defer  Quadriceps: Defer  Gastrocnemius: Defer    Nutrition Significant Labs:  Lab Results   Component Value Date    WBC 11.9 (H) 02/14/2024    HGB 13.7 02/14/2024    HCT 41.3 02/14/2024     02/14/2024    CHOL 147 12/15/2023    TRIG 86 12/15/2023    HDL 47.6 12/15/2023    ALT 1,073 (H) 02/13/2024    AST 1,488 (H) 02/13/2024     02/14/2024    K 5.1 02/14/2024    CL 99 02/14/2024    CREATININE 4.30 (H) 02/14/2024     (H) 02/14/2024    CO2 21 (L) 02/14/2024    TSH 0.82 12/15/2023    PSA 7.7 (H) 08/12/2020    INR 1.2 (H) 11/21/2022    HGBA1C 5.2 07/09/2021       Current " Facility-Administered Medications:     cefTRIAXone (Rocephin) IVPB 1 g, 1 g, intravenous, q24h, Tarek R Gharibeh, MD, Stopped at 02/13/24 2246    clopidogrel (Plavix) tablet 75 mg, 75 mg, oral, Daily, Jasmeet Jaquez PA-C, 75 mg at 02/14/24 0851    DOBUTamine (Dobutrex) 1,000 mg in dextrose 5% 250 mL (4 mg/mL) infusion (premix), 2.5-5 mcg/kg/min, intravenous, Continuous, Tarek R Gharibeh, MD, Last Rate: 7.73 mL/hr at 02/14/24 1136, 5 mcg/kg/min at 02/14/24 1136    [Held by provider] metoprolol succinate XL (Toprol-XL) 24 hr tablet 100 mg, 100 mg, oral, Daily, Jasmeet Jaquez PA-C    [Held by provider] metoprolol tartrate (Lopressor) tablet 50 mg, 50 mg, oral, TID, Jasmeet Jaquez PA-C    norepinephrine (Levophed) 8 mg in dextrose 5% 250 mL (0.032 mg/mL) infusion (premix), 0.01-0.5 mcg/kg/min, intravenous, Continuous, Jasmeet Jaquez PA-C, Last Rate: 42.9 mL/hr at 02/14/24 1136, 0.21 mcg/kg/min at 02/14/24 1136    oxygen (O2) therapy, , inhalation, Continuous, Tarek R Gharibeh, MD, Start at 02/13/24 1815    pantoprazole (ProtoNix) EC tablet 40 mg, 40 mg, oral, Daily before breakfast **OR** pantoprazole (ProtoNix) injection 40 mg, 40 mg, intravenous, Daily before breakfast, Barber Xie, APRN-CNP, 40 mg at 02/14/24 0507    sennosides-docusate sodium (Katie-Colace) 8.6-50 mg per tablet 2 tablet, 2 tablet, oral, BID, Jasmeet Jaquez PA-C, 2 tablet at 02/14/24 0851    [Held by provider] tamsulosin (Flomax) 24 hr capsule 0.4 mg, 0.4 mg, oral, Daily, Jasmeet Jaquez PA-C    Dietary Orders (From admission, onward)       Start     Ordered    02/14/24 1129  Adult diet Clear Liquid  Diet effective now        Question:  Diet type  Answer:  Clear Liquid    02/14/24 1128                     Estimated Needs:   Estimated Energy Needs  Total Energy Estimated Needs (kCal):  (1247-2572)  Total Estimated Energy Need per Day (kCal/kg):  (22-25)  Method for Estimating Needs: IBW    Estimated Protein Needs  Total Protein Estimated Needs (g):   ()  Total Protein Estimated Needs (g/kg):  (1.1-1.4)  Method for Estimating Needs: IBW    Estimated Fluid Needs  Total Fluid Estimated Needs (mL):  (7193-8532)  Method for Estimating Needs: 1 mL/kcal      Nutrition Diagnosis   Nutrition Diagnosis:     Nutrition Diagnosis  Patient has Nutrition Diagnosis: Yes  Diagnosis Status (1): New  Nutrition Diagnosis 1: Increased nutrient needs  Related to (1): Increased demand for nutrient  As Evidenced by (1): CHF     Nutrition Interventions/Recommendations   Nutrition Interventions and Recommendations:    Nutrition Prescription:  Individualized Nutrition Prescription Provided for : 1958-2225 calories,  via oral diet    Nutrition Interventions:   Food and/or Nutrient Delivery Interventions  Interventions: Meals and snacks  Meals and Snacks: Fat-modified diet, Mineral-modified diet  Goal: Advance as able    Education Documentation  No documentation found.         Nutrition Monitoring and Evaluation   Monitoring/Evaluation:   Food/Nutrient Related History Monitoring  Monitoring and Evaluation Plan: Energy intake  Energy Intake: Estimated energy intake  Criteria: Monitoring for diet advancement       Time Spent/Follow-up:   Follow Up  Time Spent (min): 30 minutes  Last Date of Nutrition Visit: 02/14/24  Nutrition Follow-Up Needed?: 5-7 days  Follow up Comment: 2/19/24

## 2024-02-14 NOTE — CONSULTS
Consults  History Of Present Illness:    Tesfaye Milton is a 78 y.o. male presenting with significant prior history of coronary artery disease history of stent placement to the left Antocin artery as well as to the dominant left circumflex coronary in the past with a small nondominant right coronary artery and history of ischemic cardiomyopathy last known ejection fraction of 40 to 45% as an outpatient had history of chronic atrial fibrillation failed radiofrequency cath ablation, history of prostate cancer for which she underwent a radiation seed placement by Dr. Ron krueger in the past follows with Dr. 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  As he came along with his wife I have encouraged patient to go to the emergency room and patient was evaluated emergency room and subsequent admitted to the intensive care unit for management of what appears to be cardiogenic shock.  Patient denies any symptoms or chest pain.  His main symptoms were his air hunger and shortness of breath.  This has been gradually getting worse for the last few days.  Patient has underlying COPD as he smokes cigars.  Noted to have an abnormal stress myocardial perfusion study in the past but he elected not to pursue further invasive evaluation as he was not having any active symptoms of chest pain and was managing his acts of daily living with no limitations in his functional capacity.  He used to fly airplanes as a hobby and he gave up the license after his myocardial infarction  Last Recorded Vitals:  Vitals:    02/14/24 0200 02/14/24 0300 02/14/24 0400 02/14/24 0413   BP: 100/68 97/71 86/52    Pulse: 92 94 92 95   Resp: 25 24 21 25   Temp:       TempSrc:       SpO2:   96%    Weight:       Height:           Last Labs:  CBC - 2/14/2024:  4:28 AM  11.9 13.7 208    41.3       CMP - 2/13/2024:  3:29 PM  7.0 5.3 1,488 --- 1.9   _ 3.0 1,073 96      PTT - No results in last year.  _   _ _     Hemoglobin A1C   Date/Time Value Ref Range Status   07/09/2021 09:12 AM 5.2 % Final     Comment:          Diagnosis of Diabetes-Adults   Non-Diabetic: < or = 5.6%   Increased risk for developing diabetes: 5.7-6.4%   Diagnostic of diabetes: > or = 6.5%  .       Monitoring of Diabetes                Age (y)     Therapeutic Goal (%)   Adults:          >18           <7.0   Pediatrics:    13-18           <7.5                   7-12           <8.0                   0- 6            7.5-8.5   American Diabetes Association. Diabetes Care 33(S1), Jan 2010.       LDL Calculated   Date/Time Value Ref Range Status   12/15/2023 10:28 AM 82 <=99 mg/dL Final     Comment:                                 Near   Borderline      AGE      Desirable  Optimal    High     High     Very High     0-19 Y     0 - 109     ---    110-129   >/= 130     ----    20-24 Y     0 - 119     ---    120-159   >/= 160     ----      >24 Y     0 -  99   100-129  130-159   160-189     >/=190     05/09/2020 04:24  (H) 65 - 130 MG/DL Final     VLDL   Date/Time Value Ref Range Status   12/15/2023 10:28 AM 17 0 - 40 mg/dL Final   09/20/2023 09:51 AM 14 0 - 40 mg/dL Final   06/30/2022 09:30 AM 19 0 - 40 mg/dL Final   01/18/2022 12:50 PM 21 0 - 40 mg/dL Final      Last I/O:  No intake/output data recorded.    Past Cardiology Tests (Last 3 Years):  EKG:  ECG 12 Lead     Echo:  Transthoracic Echo (TTE) Complete 02/13/2024    Ejection Fractions:  EF   Date/Time Value Ref Range Status   02/13/2024 02:34 PM 27 %      Cath:  No results found for this or any previous visit from the past 1095 days.    Stress Test:  No results found for this or any previous visit from the past 1095 days.    Cardiac Imaging:  No results found for this or any previous visit from the past 1095 days.      Past Medical History:  He has a past medical history of Anxiety  disorder, unspecified, Atrial fibrillation (CMS/[facility]), CAD (coronary artery disease), CHF (congestive heart failure) (CMS/[facility]), High cholesterol, Hypertension, Long term (current) use of anticoagulants, Other conditions influencing health status, Personal history of other diseases of male genital organs, Personal history of other diseases of the circulatory system (07/26/2013), Personal history of other endocrine, nutritional and metabolic disease (07/26/2013), and Prostate disorder.    Past Surgical History:  He has a past surgical history that includes Umbilical hernia repair (04/08/2013); Other surgical history (04/08/2013); Appendectomy (04/08/2013); and Knee arthroscopy w/ debridement (04/08/2013).      Social History:  He reports that he has quit smoking. His smoking use included cigarettes. He smoked an average of .25 packs per day. He has never used smokeless tobacco. He reports that he does not currently use alcohol after a past usage of about 7.0 standard drinks of alcohol per week. He reports that he does not use drugs.    Family History:  Family History   Problem Relation Name Age of Onset    Stroke Mother      Dementia Mother      Stroke Father          Allergies:  Patient has no known allergies.    Inpatient Medications:  Scheduled medications   Medication Dose Route Frequency    cefTRIAXone  1 g intravenous q24h    clopidogrel  75 mg oral Daily    [Held by provider] metoprolol succinate XL  100 mg oral Daily    [Held by provider] metoprolol tartrate  50 mg oral TID    pantoprazole  40 mg oral Daily before breakfast    Or    pantoprazole  40 mg intravenous Daily before breakfast    rivaroxaban  20 mg oral Daily with evening meal    [Held by provider] sacubitriL-valsartan  1 tablet oral BID    sennosides-docusate sodium  2 tablet oral BID    [Held by provider] tamsulosin  0.4 mg oral Daily     PRN medications   Medication     Continuous Medications   Medication Dose Last Rate    DOBUTamine  2.5-5  mcg/kg/min 5 mcg/kg/min (02/14/24 2905)    norepinephrine  0.01-0.5 mcg/kg/min 0.21 mcg/kg/min (02/14/24 0505)    oxygen         Outpatient Medications:  Current Outpatient Medications   Medication Instructions    clopidogrel (PLAVIX) 75 mg, oral, Daily    metoprolol succinate XL (TOPROL-XL) 100 mg, oral, Daily, Do not crush or chew.    metoprolol tartrate (LOPRESSOR) 50 mg, oral, 3 times daily, For 90 days    mirabegron (Myrbetriq) 25 mg tablet extended release 24 hr 24 hr tablet     nitroglycerin (Nitrostat) 0.4 mg SL tablet sublingual, may be repeated every 5 minutes up to a maximum of 3 doses in a 15 minute period<BR>    rosuvastatin (CRESTOR) 10 mg, oral, Daily    sacubitriL-valsartan (Entresto)  mg tablet 1 tablet, oral, 2 times daily    tamsulosin (Flomax) 0.4 mg 24 hr capsule     Xarelto 20 mg, oral, Daily       Physical Exam:  Patient appears quite short of breath.  HEENT PRL neck elevated jugular is distention lungs decreased air entry at the bases posteriorly heart S1-S2 present with no significant murmurs abdominal obese and soft extremity shows evidence of trace pedal edema      Assessment/Plan   1.  Hypotension-secondary to cardiogenic shock.  Denies any active symptoms or chest pain.  Stat echocardiogram performed in the emergency room this afternoon showed ejection fraction of 20-25% with evidence of  Mild To moderate mitral regurgitation.  Patient was initiated on IV Levophed in the emergency room and after discussion with intensivist we have initiated patient on a small dose of IV dobutamine administered 2.5 mics per kilogram per minute and will optimize the dose to 5 mics per kilogram per minute.  Hopefully by increasing the cardiac output with inotropic support his renal function will improve.  2.  Acute renal insufficiency secondary to hypoperfusion from cardiogenic shock.  My expectation is that his renal function will get worse before it starts plateauing gets better.  Informed the  patient's wife in the intensive care unit.  3.  Elevated HS troponins.  EKG does not show any acute ST segment changes however these could be falsely elevated secondary to acute renal failure with evidence of oliguria.  Renal function is back to baseline then he will merit to have left heart catheterization to evaluate his coronary abdomen and consider revascularization options.  Start patient on low intensity low molecular weight heparin in 24hrs as he received xarelto this evening. Will continue plavix for now along with Aspirin.  Will discontinue all nephrotoxic agents for now and will reevaluate initiating oral afterload reducing agents once his renal function starts improving.  4. Shortness of breath secondary to pulmonary edema superimposed on underlying COPD. His probnp was elevated and this is once again multifactorial due to JEFF and fluid overload with decompensated acute on chronic LV systolic dysfunction. Will cautiously diurese him as his blood pressure tolerates.    Peripheral IV 02/13/24 20 G Left Forearm (Active)   Site Assessment Clean;Intact;Dry 02/13/24 2049   Dressing Type Transparent 02/13/24 2049   Line Status Infusing 02/13/24 2049   Dressing Status Clean;Dry;Occlusive 02/13/24 2049   Number of days: 1       Peripheral IV 02/13/24 20 G Proximal;Right Forearm (Active)   Site Assessment Clean;Dry 02/13/24 2052   Dressing Status Clean;Dry;Occlusive 02/13/24 2052   Number of days: 1       Urethral Catheter 16 Fr. (Active)   Output (mL) 150 mL 02/14/24 0409   Number of days: 1       Code Status:  Full Code    I spent 55 minutes in the professional and overall care of this patient and reviewing and placing orders.        Wayne Armijo MD

## 2024-02-14 NOTE — NURSING NOTE
Head-to toe assessment completed, no skin issued noted, surgical drain to R flank area noted, moves self in bed, refuses scd's, pressure relief / waffle cushion on bed.

## 2024-02-14 NOTE — CARE PLAN
The patient's goals for the shift include  stable vitals    The clinical goals for the shift include pt to remain hemodynamically stable    Over the shift, the patient did not make progress toward the following goals. Barriers to progression include pt still on vasopressors. Recommendations to address these barriers include monitor vitals and labs.

## 2024-02-14 NOTE — NURSING NOTE
Skin assessment completed, no new areas noted. Scattered old scabs to LLL noted. Patient turns self in bed, pressure relief mattress on.

## 2024-02-14 NOTE — PROGRESS NOTES
"Tesfaye Milton is a 78 y.o. male on day 1 of admission presenting with Chronic systolic (congestive) heart failure (CMS/HCC).      Subjective   Patient was seen for acute kidney injury associated with cardiorenal syndrome patient was started on dobutamine drip as well as small amount of Levophed drip clinically he said he feels better he denies any shortness of breath he does have some cough but no phlegm production no fever no chills urine output is improving       Objective          Vitals 24HR  Heart Rate:  []   Temp:  [35.9 °C (96.6 °F)-36.8 °C (98.2 °F)]   Resp:  [8-49]   BP: ()/(38-99)   Height:  [190.5 cm (6' 3\")]   Weight:  [103 kg (226 lb 6.6 oz)-109 kg (240 lb)]   SpO2:  [83 %-100 %]     Intake/Output last 3 Shifts:    Intake/Output Summary (Last 24 hours) at 2/14/2024 1013  Last data filed at 2/14/2024 1000  Gross per 24 hour   Intake 1179.94 ml   Output 555 ml   Net 624.94 ml       Physical Exam  Neck:      Vascular: No carotid bruit.   Cardiovascular:      Rate and Rhythm: Normal rate. Rhythm irregular.      Heart sounds: No murmur heard.     No friction rub. No gallop.   Pulmonary:      Breath sounds: Wheezing present. No rhonchi or rales.   Chest:      Chest wall: No tenderness.   Abdominal:      General: There is no distension.      Tenderness: There is no abdominal tenderness. There is no guarding or rebound.   Musculoskeletal:         General: No swelling or tenderness.      Cervical back: Neck supple.      Right lower leg: No edema.      Left lower leg: No edema.   Lymphadenopathy:      Cervical: No cervical adenopathy.         Relevant Results               Assessment/Plan    Acute kidney injury I believe this is most likely secondary to cardiogenic shock and hypotension associated with cardiorenal syndrome with severely diminished ejection fraction it seems that urine output is improving renal function is improving and creatinine is trending down certainly no need for dialysis or " ultrafiltration we will continue to monitor very closely  Hypotension secondary to cardiogenic shock continue with dobutamine try to wean off norepinephrine if possible  High anion gap metabolic acidosis secondary to lactic acidosis because of hypoperfusion and shock it is improving  Cardiomyopathy new ejection fraction 20 to 25%  Abnormal liver function tests most likely secondary to passive congestion liver function is improving need to monitor  Coronary artery disease status post stenting  Chronic atrial fibrillation  History of hypertension and hyperlipidemia          Eliceo Lomax MD

## 2024-02-14 NOTE — CARE PLAN
Problem: Heart Failure  Goal: Improved gas exchange this shift  Outcome: Progressing  Flowsheets (Taken 2/13/2024 2037)  Improved gas exchange this shift:   Assist with pulmonary hygiene and secretion clearance   Prepare for use of devices/procedures to correct dysrhythmia  Goal: Improved urinary output this shift  Outcome: Progressing  Flowsheets (Taken 2/13/2024 2037)  Improved urinary output this shift:   Med administration/monitor effect   Monitor serum electrolytes/replace per order   Monitor intake/output and daily weight  Goal: Reduction in peripheral edema within 24 hours  Outcome: Progressing  Flowsheets (Taken 2/13/2024 2037)  Reduction in peripheral edema within 24 hours:   Monitor edema/skin/mucous membrane integrity   Frequent small meals/supplements per order   SCDs/elevate extremities  Goal: Report improvement of dyspnea/breathlessness this shift  Outcome: Progressing  Flowsheets (Taken 2/13/2024 2037)  Report improvement of dyspnea/breathlessness this shift:   Ambulate/OOB 3 times daily   Encourage activity/mobility/ROM   Incentive spirometry/deep breathing /HOB elevated elevated   Consider PT/OT consult   Facilitate activity/mobility per patient tolerance/advance  Goal: Weight from fluid excess reduced over 2-3 days, then stabilize  Outcome: Progressing  Flowsheets (Taken 2/13/2024 2037)  Weight from fluid excess reduced over 2-3 days, then stabilize:   Med administration/monitor effect   Monitor intake/output and daily weight   Monitor bowel elimination   Monitor serum electrolytes/replace per order  Goal: Increase self care and/or family involvement in 24 hours  Outcome: Progressing  Flowsheets (Taken 2/13/2024 2037)  Increase self care and/or family involvement in 24 hours:   Assess for signs/symptoms of depression   Organize tasks/allow time for rest   Therapy evaluation and treatment   Facilitate advanced care planning/discussion of treatment options   Recognize current coping skills and  assist to develop new coping skills     Problem: Skin  Goal: Decreased wound size/increased tissue granulation at next dressing change  Outcome: Progressing  Flowsheets (Taken 2/13/2024 2037)  Decreased wound size/increased tissue granulation at next dressing change:   Promote sleep for wound healing   Utilize specialty bed per algorithm   Protective dressings over bony prominences  Goal: Participates in plan/prevention/treatment measures  Outcome: Progressing  Flowsheets (Taken 2/13/2024 2037)  Participates in plan/prevention/treatment measures:   Discuss with provider PT/OT consult   Elevate heels   Increase activity/out of bed for meals  Goal: Prevent/manage excess moisture  Outcome: Progressing  Flowsheets (Taken 2/13/2024 2037)  Prevent/manage excess moisture:   Moisturize dry skin   Use wicking fabric (obtain order)   Follow provider orders for dressing changes   Monitor for/manage infection if present   Cleanse incontinence/protect with barrier cream  Goal: Prevent/minimize sheer/friction injuries  Outcome: Progressing  Flowsheets (Taken 2/13/2024 2037)  Prevent/minimize sheer/friction injuries:   Complete micro-shifts as needed if patient unable. Adjust patient position to relieve pressure points, not a full turn   Increase activity/out of bed for meals   Use pull sheet   HOB 30 degrees or less   Turn/reposition every 2 hours/use positioning/transfer devices   Utilize specialty bed per algorithm  Goal: Promote/optimize nutrition  Outcome: Progressing  Flowsheets (Taken 2/13/2024 2037)  Promote/optimize nutrition:   Monitor/record intake including meals   Consume > 50% meals/supplements   Discuss with provider if NPO > 2 days   Reassess MST if dietician not consulted  Goal: Promote skin healing  Outcome: Progressing  Flowsheets (Taken 2/13/2024 2037)  Promote skin healing:   Assess skin/pad under line(s)/device(s)   Protective dressings over bony prominences   Turn/reposition every 2 hours/use  positioning/transfer devices   Ensure correct size (line/device) and apply per  instructions     Problem: Safety - Adult  Goal: Free from fall injury  Outcome: Progressing  Flowsheets (Taken 2/13/2024 2037)  Free from fall injury:   Instruct family/caregiver on patient safety   Based on caregiver fall risk screen, instruct family/caregiver to ask for assistance with transferring infant if caregiver noted to have fall risk factors     Problem: Discharge Planning  Goal: Discharge to home or other facility with appropriate resources  Outcome: Progressing  Flowsheets (Taken 2/13/2024 2037)  Discharge to home or other facility with appropriate resources:   Identify discharge learning needs (meds, wound care, etc)   Refer to discharge planning if patient needs post-hospital services based on physician order or complex needs related to functional status, cognitive ability or social support system   The patient's goals for the shift include      The clinical goals for the shift include      Over the shift, the patient did not make progress toward the following goals. Barriers to progression include . Recommendations to address these barriers include .

## 2024-02-14 NOTE — CARE PLAN
Problem: Respiratory  Goal: Minimize anxiety/maximize coping throughout shift  Outcome: Progressing  Goal: Minimal/no exertional discomfort or dyspnea this shift  Outcome: Progressing  Goal: Tolerate mechanical ventilation evidenced by VS/agitation level this shift  Outcome: Progressing

## 2024-02-15 LAB
ALBUMIN SERPL-MCNC: 2.9 G/DL (ref 3.5–5)
ALP BLD-CCNC: 97 U/L (ref 35–125)
ALT SERPL-CCNC: 886 U/L (ref 5–40)
ANION GAP SERPL CALC-SCNC: 14 MMOL/L
APTT PPP: 31.9 SECONDS (ref 22–32.5)
AST SERPL-CCNC: 375 U/L (ref 5–40)
BASOPHILS # BLD AUTO: 0.01 X10*3/UL (ref 0–0.1)
BASOPHILS NFR BLD AUTO: 0.1 %
BILIRUB SERPL-MCNC: 0.9 MG/DL (ref 0.1–1.2)
BUN SERPL-MCNC: 82 MG/DL (ref 8–25)
CALCIUM SERPL-MCNC: 8.1 MG/DL (ref 8.5–10.4)
CHLORIDE SERPL-SCNC: 100 MMOL/L (ref 97–107)
CO2 SERPL-SCNC: 21 MMOL/L (ref 24–31)
CREAT SERPL-MCNC: 2.5 MG/DL (ref 0.4–1.6)
EGFRCR SERPLBLD CKD-EPI 2021: 26 ML/MIN/1.73M*2
EOSINOPHIL # BLD AUTO: 0.02 X10*3/UL (ref 0–0.4)
EOSINOPHIL NFR BLD AUTO: 0.2 %
ERYTHROCYTE [DISTWIDTH] IN BLOOD BY AUTOMATED COUNT: 14.3 % (ref 11.5–14.5)
ERYTHROCYTE [DISTWIDTH] IN BLOOD BY AUTOMATED COUNT: 14.7 % (ref 11.5–14.5)
GLUCOSE SERPL-MCNC: 134 MG/DL (ref 65–99)
HCT VFR BLD AUTO: 43.7 % (ref 41–52)
HCT VFR BLD AUTO: 44.7 % (ref 41–52)
HGB BLD-MCNC: 14.4 G/DL (ref 13.5–17.5)
HGB BLD-MCNC: 14.8 G/DL (ref 13.5–17.5)
IMM GRANULOCYTES # BLD AUTO: 0.05 X10*3/UL (ref 0–0.5)
IMM GRANULOCYTES NFR BLD AUTO: 0.5 % (ref 0–0.9)
LYMPHOCYTES # BLD AUTO: 0.66 X10*3/UL (ref 0.8–3)
LYMPHOCYTES NFR BLD AUTO: 6.1 %
MAGNESIUM SERPL-MCNC: 2.8 MG/DL (ref 1.6–3.1)
MCH RBC QN AUTO: 34.4 PG (ref 26–34)
MCH RBC QN AUTO: 34.7 PG (ref 26–34)
MCHC RBC AUTO-ENTMCNC: 33 G/DL (ref 32–36)
MCHC RBC AUTO-ENTMCNC: 33.1 G/DL (ref 32–36)
MCV RBC AUTO: 105 FL (ref 80–100)
MCV RBC AUTO: 105 FL (ref 80–100)
MONOCYTES # BLD AUTO: 0.86 X10*3/UL (ref 0.05–0.8)
MONOCYTES NFR BLD AUTO: 8 %
NEUTROPHILS # BLD AUTO: 9.21 X10*3/UL (ref 1.6–5.5)
NEUTROPHILS NFR BLD AUTO: 85.1 %
NRBC BLD-RTO: 0 /100 WBCS (ref 0–0)
NRBC BLD-RTO: 0 /100 WBCS (ref 0–0)
PLATELET # BLD AUTO: 197 X10*3/UL (ref 150–450)
PLATELET # BLD AUTO: 197 X10*3/UL (ref 150–450)
POTASSIUM SERPL-SCNC: 3.9 MMOL/L (ref 3.4–5.1)
PROT SERPL-MCNC: 5.6 G/DL (ref 5.9–7.9)
RBC # BLD AUTO: 4.18 X10*6/UL (ref 4.5–5.9)
RBC # BLD AUTO: 4.27 X10*6/UL (ref 4.5–5.9)
SODIUM SERPL-SCNC: 135 MMOL/L (ref 133–145)
TROPONIN T SERPL-MCNC: 1746 NG/L
TROPONIN T SERPL-MCNC: 1940 NG/L
WBC # BLD AUTO: 10.8 X10*3/UL (ref 4.4–11.3)
WBC # BLD AUTO: 8.8 X10*3/UL (ref 4.4–11.3)

## 2024-02-15 PROCEDURE — 2500000005 HC RX 250 GENERAL PHARMACY W/O HCPCS

## 2024-02-15 PROCEDURE — 2500000004 HC RX 250 GENERAL PHARMACY W/ HCPCS (ALT 636 FOR OP/ED): Performed by: INTERNAL MEDICINE

## 2024-02-15 PROCEDURE — 36415 COLL VENOUS BLD VENIPUNCTURE: CPT | Performed by: INTERNAL MEDICINE

## 2024-02-15 PROCEDURE — 84484 ASSAY OF TROPONIN QUANT: CPT | Performed by: INTERNAL MEDICINE

## 2024-02-15 PROCEDURE — 99233 SBSQ HOSP IP/OBS HIGH 50: CPT | Performed by: INTERNAL MEDICINE

## 2024-02-15 PROCEDURE — 85730 THROMBOPLASTIN TIME PARTIAL: CPT | Performed by: INTERNAL MEDICINE

## 2024-02-15 PROCEDURE — 2500000001 HC RX 250 WO HCPCS SELF ADMINISTERED DRUGS (ALT 637 FOR MEDICARE OP)

## 2024-02-15 PROCEDURE — 85025 COMPLETE CBC W/AUTO DIFF WBC: CPT | Performed by: INTERNAL MEDICINE

## 2024-02-15 PROCEDURE — 2020000001 HC ICU ROOM DAILY

## 2024-02-15 PROCEDURE — 84075 ASSAY ALKALINE PHOSPHATASE: CPT | Performed by: INTERNAL MEDICINE

## 2024-02-15 PROCEDURE — 94660 CPAP INITIATION&MGMT: CPT

## 2024-02-15 PROCEDURE — 85027 COMPLETE CBC AUTOMATED: CPT | Performed by: INTERNAL MEDICINE

## 2024-02-15 PROCEDURE — 83735 ASSAY OF MAGNESIUM: CPT

## 2024-02-15 PROCEDURE — 2500000002 HC RX 250 W HCPCS SELF ADMINISTERED DRUGS (ALT 637 FOR MEDICARE OP, ALT 636 FOR OP/ED)

## 2024-02-15 RX ORDER — ENOXAPARIN SODIUM 100 MG/ML
30 INJECTION SUBCUTANEOUS ONCE
Status: COMPLETED | OUTPATIENT
Start: 2024-02-15 | End: 2024-02-15

## 2024-02-15 RX ORDER — HEPARIN SODIUM 5000 [USP'U]/ML
4000 INJECTION, SOLUTION INTRAVENOUS; SUBCUTANEOUS ONCE
Status: COMPLETED | OUTPATIENT
Start: 2024-02-15 | End: 2024-02-15

## 2024-02-15 RX ORDER — HEPARIN SODIUM 10000 [USP'U]/100ML
0-4000 INJECTION, SOLUTION INTRAVENOUS CONTINUOUS
Status: DISCONTINUED | OUTPATIENT
Start: 2024-02-15 | End: 2024-02-19 | Stop reason: HOSPADM

## 2024-02-15 RX ORDER — ENOXAPARIN SODIUM 100 MG/ML
30 INJECTION SUBCUTANEOUS ONCE
Status: DISCONTINUED | OUTPATIENT
Start: 2024-02-16 | End: 2024-02-15

## 2024-02-15 RX ORDER — HEPARIN SODIUM 5000 [USP'U]/ML
2000-4000 INJECTION, SOLUTION INTRAVENOUS; SUBCUTANEOUS AS NEEDED
Status: DISCONTINUED | OUTPATIENT
Start: 2024-02-15 | End: 2024-02-19 | Stop reason: HOSPADM

## 2024-02-15 RX ADMIN — CEFTRIAXONE SODIUM 1 G: 1 INJECTION, SOLUTION INTRAVENOUS at 21:17

## 2024-02-15 RX ADMIN — ENOXAPARIN SODIUM 30 MG: 30 INJECTION SUBCUTANEOUS at 11:15

## 2024-02-15 RX ADMIN — HEPARIN SODIUM 4000 UNITS: 5000 INJECTION, SOLUTION INTRAVENOUS; SUBCUTANEOUS at 18:33

## 2024-02-15 RX ADMIN — DOBUTAMINE IN DEXTROSE 5 MCG/KG/MIN: 400 INJECTION, SOLUTION INTRAVENOUS at 02:35

## 2024-02-15 RX ADMIN — SENNOSIDES AND DOCUSATE SODIUM 2 TABLET: 50; 8.6 TABLET ORAL at 08:52

## 2024-02-15 RX ADMIN — NOREPINEPHRINE BITARTRATE 0.16 MCG/KG/MIN: 8 INJECTION, SOLUTION INTRAVENOUS at 18:33

## 2024-02-15 RX ADMIN — SENNOSIDES AND DOCUSATE SODIUM 2 TABLET: 50; 8.6 TABLET ORAL at 21:17

## 2024-02-15 RX ADMIN — HEPARIN SODIUM 1000 UNITS/HR: 10000 INJECTION, SOLUTION INTRAVENOUS at 18:33

## 2024-02-15 RX ADMIN — NOREPINEPHRINE BITARTRATE 0.1 MCG/KG/MIN: 8 INJECTION, SOLUTION INTRAVENOUS at 08:52

## 2024-02-15 RX ADMIN — PANTOPRAZOLE SODIUM 40 MG: 40 TABLET, DELAYED RELEASE ORAL at 05:08

## 2024-02-15 RX ADMIN — CLOPIDOGREL BISULFATE 75 MG: 75 TABLET ORAL at 08:52

## 2024-02-15 ASSESSMENT — PAIN SCALES - GENERAL
PAINLEVEL_OUTOF10: 0 - NO PAIN

## 2024-02-15 ASSESSMENT — ACTIVITIES OF DAILY LIVING (ADL): LACK_OF_TRANSPORTATION: NO

## 2024-02-15 ASSESSMENT — PAIN - FUNCTIONAL ASSESSMENT
PAIN_FUNCTIONAL_ASSESSMENT: 0-10

## 2024-02-15 NOTE — CARE PLAN
The patient's goals for the shift include  breath better    The clinical goals for the shift include pt will remain hemodynamically stable and wean pressors as tolarated      Problem: Heart Failure  Goal: Improved gas exchange this shift  Outcome: Progressing  Goal: Improved urinary output this shift  Outcome: Progressing  Goal: Reduction in peripheral edema within 24 hours  Outcome: Progressing  Goal: Report improvement of dyspnea/breathlessness this shift  Outcome: Progressing  Goal: Weight from fluid excess reduced over 2-3 days, then stabilize  Outcome: Progressing  Goal: Increase self care and/or family involvement in 24 hours  Outcome: Progressing     Problem: Skin  Goal: Decreased wound size/increased tissue granulation at next dressing change  Outcome: Progressing  Flowsheets (Taken 2/14/2024 2320)  Decreased wound size/increased tissue granulation at next dressing change:   Promote sleep for wound healing   Protective dressings over bony prominences   Utilize specialty bed per algorithm  Goal: Participates in plan/prevention/treatment measures  Outcome: Progressing  Flowsheets (Taken 2/14/2024 2320)  Participates in plan/prevention/treatment measures:   Discuss with provider PT/OT consult   Elevate heels  Goal: Prevent/manage excess moisture  Outcome: Progressing  Flowsheets (Taken 2/14/2024 2320)  Prevent/manage excess moisture:   Cleanse incontinence/protect with barrier cream   Monitor for/manage infection if present   Follow provider orders for dressing changes   Moisturize dry skin  Goal: Prevent/minimize sheer/friction injuries  Outcome: Progressing  Flowsheets (Taken 2/14/2024 2320)  Prevent/minimize sheer/friction injuries:   Complete micro-shifts as needed if patient unable. Adjust patient position to relieve pressure points, not a full turn   Use pull sheet   Turn/reposition every 2 hours/use positioning/transfer devices   Utilize specialty bed per algorithm  Goal: Promote/optimize  nutrition  Outcome: Progressing  Flowsheets (Taken 2/14/2024 2320)  Promote/optimize nutrition:   Consume > 50% meals/supplements   Offer water/supplements/favorite foods   Monitor/record intake including meals  Goal: Promote skin healing  Outcome: Progressing  Flowsheets (Taken 2/14/2024 2320)  Promote skin healing:   Protective dressings over bony prominences   Turn/reposition every 2 hours/use positioning/transfer devices   Assess skin/pad under line(s)/device(s)     Problem: Safety - Adult  Goal: Free from fall injury  Outcome: Progressing     Problem: Discharge Planning  Goal: Discharge to home or other facility with appropriate resources  Outcome: Progressing     Problem: Respiratory  Goal: Minimize anxiety/maximize coping throughout shift  Outcome: Progressing  Goal: Minimal/no exertional discomfort or dyspnea this shift  Outcome: Progressing  Goal: Tolerate mechanical ventilation evidenced by VS/agitation level this shift  Outcome: Progressing

## 2024-02-15 NOTE — PROGRESS NOTES
"Tesfaye Milton is a 78 y.o. male on day 2 of admission presenting with Chronic systolic (congestive) heart failure (CMS/HCC).    Subjective   Is doing well   this morning not having any fever no chest pain mild shortness of breath occasional cough no sputum production     Objective     Physical Exam     General appears the patient looks in mild distress [place] tachypneic  Head and neck examination shows dry mucous membrane JVP is elevated he is slightly pale  Neck also showed that he has central tracking  Lung examination shows diffuse wheezes bilaterally with bibasilar crackles  Cardiac examination showed a regular rhythm with S3 and systolic ejection murmur.  Abdomen obese with a fluid thrill consistent with ascites organomegaly could not be felt but soft nontender  Edema lower extremity was noted with signs of chronic venous insufficiency  Neurologically he is alert oriented conscious x 4 without focal deficit.  Musculoskeletal there is no obvious deformity    Last Recorded Vitals  Blood pressure 92/64, pulse (!) 118, temperature 36.4 °C (97.5 °F), temperature source Temporal, resp. rate 24, height 1.905 m (6' 3\"), weight 108 kg (238 lb 8.6 oz), SpO2 94 %.  Intake/Output last 3 Shifts:  I/O last 3 completed shifts:  In: 3983.7 (36.8 mL/kg) [P.O.:1770; I.V.:1738.7 (16.1 mL/kg); IV Piggyback:475]  Out: 2680 (24.8 mL/kg) [Urine:2680 (0.7 mL/kg/hr)]  Weight: 108.2 kg     Relevant Results  Scheduled medications  aspirin, 81 mg, oral, Daily  cefTRIAXone, 1 g, intravenous, q24h  clopidogrel, 75 mg, oral, Daily  [Held by provider] metoprolol succinate XL, 100 mg, oral, Daily  [Held by provider] metoprolol tartrate, 50 mg, oral, TID  pantoprazole, 40 mg, oral, Daily before breakfast   Or  pantoprazole, 40 mg, intravenous, Daily before breakfast  sennosides-docusate sodium, 2 tablet, oral, BID  [Held by provider] tamsulosin, 0.4 mg, oral, Daily      Continuous medications  DOBUTamine, 2.5-5 mcg/kg/min, Last Rate: 5 " mcg/kg/min (02/15/24 0700)  norepinephrine, 0.01-0.5 mcg/kg/min, Last Rate: 0.1 mcg/kg/min (02/15/24 0852)  oxygen,       PRN medications  Results for orders placed or performed during the hospital encounter of 02/13/24 (from the past 24 hour(s))   Troponin T   Result Value Ref Range    Troponin T, High Sensitivity 1,800 (HH) <=14 ng/L   Magnesium   Result Value Ref Range    Magnesium 2.80 1.60 - 3.10 mg/dL   Comprehensive metabolic panel   Result Value Ref Range    Glucose 134 (H) 65 - 99 mg/dL    Sodium 135 133 - 145 mmol/L    Potassium 3.9 3.4 - 5.1 mmol/L    Chloride 100 97 - 107 mmol/L    Bicarbonate 21 (L) 24 - 31 mmol/L    Urea Nitrogen 82 (H) 8 - 25 mg/dL    Creatinine 2.50 (H) 0.40 - 1.60 mg/dL    eGFR 26 (L) >60 mL/min/1.73m*2    Calcium 8.1 (L) 8.5 - 10.4 mg/dL    Albumin 2.9 (L) 3.5 - 5.0 g/dL    Alkaline Phosphatase 97 35 - 125 U/L    Total Protein 5.6 (L) 5.9 - 7.9 g/dL     (H) 5 - 40 U/L    Bilirubin, Total 0.9 0.1 - 1.2 mg/dL     (H) 5 - 40 U/L    Anion Gap 14 <=19 mmol/L   CBC and Auto Differential   Result Value Ref Range    WBC 10.8 4.4 - 11.3 x10*3/uL    nRBC 0.0 0.0 - 0.0 /100 WBCs    RBC 4.18 (L) 4.50 - 5.90 x10*6/uL    Hemoglobin 14.4 13.5 - 17.5 g/dL    Hematocrit 43.7 41.0 - 52.0 %     (H) 80 - 100 fL    MCH 34.4 (H) 26.0 - 34.0 pg    MCHC 33.0 32.0 - 36.0 g/dL    RDW 14.3 11.5 - 14.5 %    Platelets 197 150 - 450 x10*3/uL    Neutrophils % 85.1 40.0 - 80.0 %    Immature Granulocytes %, Automated 0.5 0.0 - 0.9 %    Lymphocytes % 6.1 13.0 - 44.0 %    Monocytes % 8.0 2.0 - 10.0 %    Eosinophils % 0.2 0.0 - 6.0 %    Basophils % 0.1 0.0 - 2.0 %    Neutrophils Absolute 9.21 (H) 1.60 - 5.50 x10*3/uL    Immature Granulocytes Absolute, Automated 0.05 0.00 - 0.50 x10*3/uL    Lymphocytes Absolute 0.66 (L) 0.80 - 3.00 x10*3/uL    Monocytes Absolute 0.86 (H) 0.05 - 0.80 x10*3/uL    Eosinophils Absolute 0.02 0.00 - 0.40 x10*3/uL    Basophils Absolute 0.01 0.00 - 0.10 x10*3/uL     XR  chest 1 view    Result Date: 2/13/2024  Interpreted By:  Nolberto Joy, STUDY: XR CHEST 1 VIEW; 2/13/2024 3:11 pm   INDICATION: Signs/Symptoms:shortness of breath.   COMPARISON: 10/28/2021 and 01/04/2019   ACCESSION NUMBER(S): [id number]   ORDERING CLINICIAN: ADORE RIVERA   TECHNIQUE: 1 view of the chest was performed.   FINDINGS: The lungs are adequately inflated. No acute consolidation. No pleural effusion. No pneumothorax.  The cardiomediastinal silhouette is mildly enlarged.       Mild cardiomegaly. No acute cardiopulmonary disease.   Signed by: Nolberto Joy 2/13/2024 3:46 PM Dictation workstation:   [id number]    Transthoracic Echo (TTE) Complete    Result Date: 2/13/2024           [place] [address]            Phone [phone] TRANSTHORACIC ECHOCARDIOGRAM REPORT  Patient Name:      YAS DORANTES         Reading Physician:    [id number] Wayne Armijo MD Study Date:        2/13/2024             Ordering Provider:    [id number] OPAL NEVAREZ MRN/PID:           [id number]              Fellow: Accession#:        [id number]          Nurse: Date of Birth/Age: 1945 / 78 years  Sonographer:          Rustam Pena RDCS Gender:            M                     Additional Staff: Height:            188.00 cm             Admit Date: Weight:            110.00 kg             Admission Status:     Inpatient - STAT BSA / BMI:         2.36 m2 / 31.12 kg/m2 Department Location:  [facility] Blood Pressure: 112 /101 mmHg Study Type:    TRANSTHORACIC ECHO (TTE) COMPLETE Diagnosis/ICD: Chronic systolic (congestive) heart failure (CHF)-I50.22 Indication:    Congestive Heart Failure CPT Codes:     Echo Complete w Full Doppler-[id number] Patient History: Smoker:            Current.  Pertinent History: CAD, Chest Pain, CHF, HTN, Hyperlipidemia, LE Edema, Murmur,                    Dyspnea and Syncope. PCI, HFrEF 40%, Abn Ekg. Study Detail: The following Echo studies were performed: 2D, M-Mode, Doppler and               color flow. Technically challenging study due to poor acoustic               windows and body habitus.  PHYSICIAN INTERPRETATION: Left Ventricle: Left ventricular systolic function is severely decreased, with an estimated ejection fraction of 20-25%. Wall motion is abnormal. The left ventricular cavity size is mildly dilated. Spectral Doppler shows an impaired relaxation pattern of left ventricular diastolic filling. LV Wall Scoring: The RCA distribution, entire apex, basal and mid inferolateral wall, basal anteroseptal segment, and mid anterolateral segment are hypokinetic. All remaining scored segments are normal. Left Atrium: The left atrium is moderately dilated. Right Ventricle: The right ventricle is slightly enlarged. There is mildly reduced right ventricular systolic function. Right Atrium: The right atrium is moderately dilated. Aortic Valve: The aortic valve is trileaflet. There is no evidence of aortic valve regurgitation. The peak instantaneous gradient of the aortic valve is 24.6 mmHg. The mean gradient of the aortic valve is 13.0 mmHg. Mitral Valve: The mitral valve is normal in structure. There is moderate mitral valve regurgitation. Tricuspid Valve: The tricuspid valve is structurally normal. There is mild to moderate tricuspid regurgitation. The Doppler estimated RVSP is moderately elevated at 56.5 mmHg. Pulmonic Valve: The pulmonic valve is not well visualized. There is no indication of pulmonic valve regurgitation. Pericardium: There is no pericardial effusion noted. Aorta: The aortic root is normal.  CONCLUSIONS:  1. Left ventricular systolic function is severely decreased with a 20-25% estimated ejection fraction.  2. Multiple segmental abnormalities exist.  See findings.  3. Spectral Doppler shows an impaired relaxation pattern of left ventricular diastolic filling.  4. There is mildly reduced right ventricular systolic function.  5. The left atrium is moderately dilated.  6. The right atrium is moderately dilated.  7. Moderate mitral valve regurgitation.  8. Mild to moderate tricuspid regurgitation.  9. Moderately elevated right ventricular systolic pressure. QUANTITATIVE DATA SUMMARY: 2D MEASUREMENTS:                           Normal Ranges: LAs:           6.70 cm    (2.7-4.0cm) IVSd:          0.98 cm    (0.6-1.1cm) LVPWd:         0.89 cm    (0.6-1.1cm) LVIDd:         6.52 cm    (3.9-5.9cm) LVIDs:         5.67 cm LV Mass Index: 111.3 g/m2 LV % FS        13.0 % LA VOLUME:                                Normal Ranges: LA Vol A4C:        316.5 ml    (22+/-6mL/m2) LA Vol A2C:        218.3 ml LA Vol BP:         309.3 ml LA Vol Index A4C:  134.2ml/m2 LA Vol Index A2C:  92.5 ml/m2 LA Vol Index BP:   131.1 ml/m2 LA Area A4C:       44.0 cm2 LA Area A2C:       43.0 cm2 LA Major Axis A4C: 5.2 cm LA Major Axis A2C: 7.2 cm LA Volume Index:   89.0 ml/m2 LA Vol A4C:        190.0 ml LA Vol A2C:        205.0 ml RA VOLUME BY A/L METHOD:                       Normal Ranges: RA Area A4C: 34.0 cm2 AORTA MEASUREMENTS:                    Normal Ranges: Asc Ao, d: 3.40 cm (2.1-3.4cm) LV SYSTOLIC FUNCTION BY 2D PLANIMETRY (MOD):                     Normal Ranges: EF-A4C View: 34.3 % (>=55%) EF-A2C View: 23.4 % EF-Biplane:  26.5 % LV DIASTOLIC FUNCTION:                     Normal Ranges: MV Peak E: 0.96 m/s (0.7-1.2 m/s) MITRAL VALVE:                 Normal Ranges: MV DT: 194 msec (150-240msec) MITRAL INSUFFICIENCY:                           Normal Ranges: PISA Radius:  0.7 cm MR VTI:       93.10 cm MR Vmax:      380.00 cm/s MR Alias Murtaza: 38.5 cm/s MR Volume:    29.04 ml MR Flow Rt:   118.53 ml/s MR EROA:      0.31 cm2 AORTIC VALVE:                                    Normal Ranges: AoV Vmax:                 2.48 m/s  (<=1.7m/s) AoV Peak P.6 mmHg (<20mmHg) AoV Mean P.0 mmHg (1.7-11.5mmHg) LVOT Max Murtaza:            0.59 m/s  (<=1.1m/s) AoV VTI:                 44.40 cm  (18-25cm) LVOT VTI:                8.97 cm LVOT Diameter:           2.00 cm   (1.8-2.4cm) AoV Area, VTI:           0.63 cm2  (2.5-5.5cm2) AoV Area,Vmax:           0.74 cm2  (2.5-4.5cm2) AoV Dimensionless Index: 0.20 AORTIC INSUFFICIENCY: AI Vmax:       2.62 m/s AI Half-time:  482 msec AI Decel Rate: 141.00 cm/s2  RIGHT VENTRICLE: RV Basal 5.50 cm RV Mid   4.42 cm RV Major 6.9 cm TAPSE:   12.4 mm RV s'    0.06 m/s TRICUSPID VALVE/RVSP:                             Normal Ranges: Peak TR Velocity: 3.22 m/s RV Syst Pressure: 56.5 mmHg (< 30mmHg) IVC Diam:         2.59 cm PULMONIC VALVE:                      Normal Ranges: PV Max Murtaza: 1.3 m/s  (0.6-0.9m/s) PV Max P.0 mmHg  [id number] Wayne Armijo MD Electronically signed on 2024 at 2:48:39 PM  Wall Scoring  ** Final **                          Assessment/Plan   Principal Problem:    Chronic systolic (congestive) heart failure (CMS/HCC)    Shock likely cardiogenic bedside echocardiogram showed ejection fraction 25% he has history of ischemic cardiomyopathy cardiology will be following he is currently in [place].  The cause of current decompensation could be acute acute ischemic event versus infection.  Positive troponin ischemia versus decompensated heart failure will continue antiplatelet statin and cardiology is following  Acute on chronic renal failure likely cardiorenal we will keep mean arterial pressure more than 65 and monitor urine output    Elevated liver enzymes likely due to liver congestion secondary to right-sided heart failure we will trend liver enzymes we will hold statin due to the hepatotoxic effect.    Possible UTI the patient has no active symptoms we will start ceftriaxone empirically until infection is ruled out    Will discuss with  cardiology future plans as we are unable to wean pressors    Critical care time is 20 minutes                Tarek R Gharibeh, MD

## 2024-02-15 NOTE — PROGRESS NOTES
TCC met with patient at the bedside. Assessment complete. Patient lives with his spouse. They reside in a house. Patient is independent. Patient drives and is able to assist with shopping, cooking and cleaning. Patient smokes 1/4 PPD and drinks 1 cocktail per day. Patient follows Dr. JANAE Araujo. Patient fills prescriptions at [facility] in [place]. At the time of discharge, patient anticipates discharging home. Will follow.       Peggy Vuong RN

## 2024-02-15 NOTE — CARE PLAN
The clinical goals for the shift include Wean Pressors as tolerated    Problem: Heart Failure  Goal: Improved gas exchange this shift  Outcome: Progressing  Goal: Improved urinary output this shift  Outcome: Progressing  Goal: Reduction in peripheral edema within 24 hours  Outcome: Progressing  Goal: Report improvement of dyspnea/breathlessness this shift  Outcome: Progressing  Goal: Weight from fluid excess reduced over 2-3 days, then stabilize  Outcome: Progressing  Goal: Increase self care and/or family involvement in 24 hours  Outcome: Progressing     Problem: Skin  Goal: Decreased wound size/increased tissue granulation at next dressing change  Outcome: Progressing  Flowsheets (Taken 2/15/2024 1533)  Decreased wound size/increased tissue granulation at next dressing change:   Promote sleep for wound healing   Protective dressings over bony prominences   Utilize specialty bed per algorithm  Goal: Participates in plan/prevention/treatment measures  Outcome: Progressing  Flowsheets (Taken 2/15/2024 1533)  Participates in plan/prevention/treatment measures:   Discuss with provider PT/OT consult   Elevate heels  Goal: Prevent/manage excess moisture  Outcome: Progressing  Flowsheets (Taken 2/15/2024 1533)  Prevent/manage excess moisture:   Cleanse incontinence/protect with barrier cream   Moisturize dry skin   Monitor for/manage infection if present   Follow provider orders for dressing changes  Goal: Prevent/minimize sheer/friction injuries  Outcome: Progressing  Flowsheets (Taken 2/15/2024 1533)  Prevent/minimize sheer/friction injuries:   Complete micro-shifts as needed if patient unable. Adjust patient position to relieve pressure points, not a full turn   Increase activity/out of bed for meals   Use pull sheet   Utilize specialty bed per algorithm   Turn/reposition every 2 hours/use positioning/transfer devices   HOB 30 degrees or less  Goal: Promote/optimize nutrition  Outcome: Progressing  Flowsheets (Taken  2/15/2024 1533)  Promote/optimize nutrition:   Monitor/record intake including meals   Consume > 50% meals/supplements   Offer water/supplements/favorite foods  Goal: Promote skin healing  Outcome: Progressing  Flowsheets (Taken 2/15/2024 1533)  Promote skin healing:   Assess skin/pad under line(s)/device(s)   Protective dressings over bony prominences   Turn/reposition every 2 hours/use positioning/transfer devices   Rotate device position/do not position patient on device   Ensure correct size (line/device) and apply per  instructions     Problem: Safety - Adult  Goal: Free from fall injury  Outcome: Progressing     Problem: Discharge Planning  Goal: Discharge to home or other facility with appropriate resources  Outcome: Progressing     Problem: Respiratory  Goal: Minimize anxiety/maximize coping throughout shift  Outcome: Progressing  Goal: Minimal/no exertional discomfort or dyspnea this shift  Outcome: Progressing  Goal: Tolerate mechanical ventilation evidenced by VS/agitation level this shift  Outcome: Progressing

## 2024-02-16 ENCOUNTER — APPOINTMENT (OUTPATIENT)
Dept: RADIOLOGY | Facility: HOSPITAL | Age: 79
DRG: 280 | End: 2024-02-16
Payer: MEDICARE

## 2024-02-16 LAB
ALBUMIN SERPL-MCNC: 2.7 G/DL (ref 3.5–5)
ALP BLD-CCNC: 94 U/L (ref 35–125)
ALT SERPL-CCNC: 630 U/L (ref 5–40)
ANION GAP SERPL CALC-SCNC: 9 MMOL/L
APTT PPP: 37.4 SECONDS (ref 22–32.5)
APTT PPP: 46.5 SECONDS (ref 22–32.5)
APTT PPP: 48.9 SECONDS (ref 22–32.5)
AST SERPL-CCNC: 214 U/L (ref 5–40)
BASOPHILS # BLD AUTO: 0.02 X10*3/UL (ref 0–0.1)
BASOPHILS NFR BLD AUTO: 0.2 %
BILIRUB SERPL-MCNC: 0.8 MG/DL (ref 0.1–1.2)
BUN SERPL-MCNC: 51 MG/DL (ref 8–25)
CALCIUM SERPL-MCNC: 8.1 MG/DL (ref 8.5–10.4)
CHLORIDE SERPL-SCNC: 102 MMOL/L (ref 97–107)
CO2 SERPL-SCNC: 25 MMOL/L (ref 24–31)
CREAT SERPL-MCNC: 1.6 MG/DL (ref 0.4–1.6)
EGFRCR SERPLBLD CKD-EPI 2021: 44 ML/MIN/1.73M*2
EOSINOPHIL # BLD AUTO: 0.04 X10*3/UL (ref 0–0.4)
EOSINOPHIL NFR BLD AUTO: 0.4 %
ERYTHROCYTE [DISTWIDTH] IN BLOOD BY AUTOMATED COUNT: 14.6 % (ref 11.5–14.5)
GLUCOSE SERPL-MCNC: 136 MG/DL (ref 65–99)
HCT VFR BLD AUTO: 46 % (ref 41–52)
HGB BLD-MCNC: 15.1 G/DL (ref 13.5–17.5)
IMM GRANULOCYTES # BLD AUTO: 0.06 X10*3/UL (ref 0–0.5)
IMM GRANULOCYTES NFR BLD AUTO: 0.7 % (ref 0–0.9)
LYMPHOCYTES # BLD AUTO: 0.5 X10*3/UL (ref 0.8–3)
LYMPHOCYTES NFR BLD AUTO: 5.5 %
MAGNESIUM SERPL-MCNC: 2.9 MG/DL (ref 1.6–3.1)
MCH RBC QN AUTO: 34.3 PG (ref 26–34)
MCHC RBC AUTO-ENTMCNC: 32.8 G/DL (ref 32–36)
MCV RBC AUTO: 105 FL (ref 80–100)
MONOCYTES # BLD AUTO: 0.97 X10*3/UL (ref 0.05–0.8)
MONOCYTES NFR BLD AUTO: 10.6 %
NEUTROPHILS # BLD AUTO: 7.58 X10*3/UL (ref 1.6–5.5)
NEUTROPHILS NFR BLD AUTO: 82.6 %
NRBC BLD-RTO: 0 /100 WBCS (ref 0–0)
PLATELET # BLD AUTO: 218 X10*3/UL (ref 150–450)
POTASSIUM SERPL-SCNC: 4.9 MMOL/L (ref 3.4–5.1)
PROT SERPL-MCNC: 5.6 G/DL (ref 5.9–7.9)
RBC # BLD AUTO: 4.4 X10*6/UL (ref 4.5–5.9)
SODIUM SERPL-SCNC: 136 MMOL/L (ref 133–145)
WBC # BLD AUTO: 9.2 X10*3/UL (ref 4.4–11.3)

## 2024-02-16 PROCEDURE — C1751 CATH, INF, PER/CENT/MIDLINE: HCPCS

## 2024-02-16 PROCEDURE — 99233 SBSQ HOSP IP/OBS HIGH 50: CPT | Performed by: INTERNAL MEDICINE

## 2024-02-16 PROCEDURE — 2500000004 HC RX 250 GENERAL PHARMACY W/ HCPCS (ALT 636 FOR OP/ED): Performed by: INTERNAL MEDICINE

## 2024-02-16 PROCEDURE — 2020000001 HC ICU ROOM DAILY

## 2024-02-16 PROCEDURE — 2500000001 HC RX 250 WO HCPCS SELF ADMINISTERED DRUGS (ALT 637 FOR MEDICARE OP)

## 2024-02-16 PROCEDURE — 71045 X-RAY EXAM CHEST 1 VIEW: CPT

## 2024-02-16 PROCEDURE — 36415 COLL VENOUS BLD VENIPUNCTURE: CPT | Performed by: INTERNAL MEDICINE

## 2024-02-16 PROCEDURE — 2500000005 HC RX 250 GENERAL PHARMACY W/O HCPCS

## 2024-02-16 PROCEDURE — 36569 INSJ PICC 5 YR+ W/O IMAGING: CPT

## 2024-02-16 PROCEDURE — 2780000003 HC OR 278 NO HCPCS

## 2024-02-16 PROCEDURE — 94660 CPAP INITIATION&MGMT: CPT

## 2024-02-16 PROCEDURE — 83735 ASSAY OF MAGNESIUM: CPT

## 2024-02-16 PROCEDURE — 85730 THROMBOPLASTIN TIME PARTIAL: CPT | Performed by: INTERNAL MEDICINE

## 2024-02-16 PROCEDURE — 84075 ASSAY ALKALINE PHOSPHATASE: CPT | Performed by: INTERNAL MEDICINE

## 2024-02-16 PROCEDURE — C9113 INJ PANTOPRAZOLE SODIUM, VIA: HCPCS

## 2024-02-16 PROCEDURE — 02HV33Z INSERTION OF INFUSION DEVICE INTO SUPERIOR VENA CAVA, PERCUTANEOUS APPROACH: ICD-10-PCS | Performed by: INTERNAL MEDICINE

## 2024-02-16 PROCEDURE — 2500000001 HC RX 250 WO HCPCS SELF ADMINISTERED DRUGS (ALT 637 FOR MEDICARE OP): Performed by: INTERNAL MEDICINE

## 2024-02-16 PROCEDURE — 85025 COMPLETE CBC W/AUTO DIFF WBC: CPT | Performed by: INTERNAL MEDICINE

## 2024-02-16 PROCEDURE — 51702 INSERT TEMP BLADDER CATH: CPT

## 2024-02-16 PROCEDURE — 2500000004 HC RX 250 GENERAL PHARMACY W/ HCPCS (ALT 636 FOR OP/ED)

## 2024-02-16 PROCEDURE — 87081 CULTURE SCREEN ONLY: CPT | Mod: WESLAB | Performed by: PHYSICIAN ASSISTANT

## 2024-02-16 RX ORDER — LIDOCAINE HYDROCHLORIDE 10 MG/ML
5 INJECTION INFILTRATION; PERINEURAL ONCE
Status: DISCONTINUED | OUTPATIENT
Start: 2024-02-16 | End: 2024-02-19 | Stop reason: HOSPADM

## 2024-02-16 RX ADMIN — HEPARIN SODIUM 1200 UNITS/HR: 10000 INJECTION, SOLUTION INTRAVENOUS at 16:43

## 2024-02-16 RX ADMIN — SENNOSIDES AND DOCUSATE SODIUM 2 TABLET: 50; 8.6 TABLET ORAL at 21:28

## 2024-02-16 RX ADMIN — CEFTRIAXONE SODIUM 1 G: 1 INJECTION, SOLUTION INTRAVENOUS at 22:15

## 2024-02-16 RX ADMIN — ASPIRIN 81 MG: 81 TABLET, COATED ORAL at 10:18

## 2024-02-16 RX ADMIN — NOREPINEPHRINE BITARTRATE 0.16 MCG/KG/MIN: 8 INJECTION, SOLUTION INTRAVENOUS at 02:41

## 2024-02-16 RX ADMIN — NOREPINEPHRINE BITARTRATE 0.16 MCG/KG/MIN: 8 INJECTION, SOLUTION INTRAVENOUS at 10:18

## 2024-02-16 RX ADMIN — CLOPIDOGREL BISULFATE 75 MG: 75 TABLET ORAL at 10:18

## 2024-02-16 RX ADMIN — DOBUTAMINE IN DEXTROSE 5 MCG/KG/MIN: 400 INJECTION, SOLUTION INTRAVENOUS at 10:18

## 2024-02-16 RX ADMIN — NOREPINEPHRINE BITARTRATE 0.16 MCG/KG/MIN: 8 INJECTION, SOLUTION INTRAVENOUS at 17:23

## 2024-02-16 RX ADMIN — SENNOSIDES AND DOCUSATE SODIUM 2 TABLET: 50; 8.6 TABLET ORAL at 10:18

## 2024-02-16 RX ADMIN — PANTOPRAZOLE SODIUM 40 MG: 40 INJECTION, POWDER, FOR SOLUTION INTRAVENOUS at 05:11

## 2024-02-16 ASSESSMENT — PAIN - FUNCTIONAL ASSESSMENT
PAIN_FUNCTIONAL_ASSESSMENT: 0-10

## 2024-02-16 ASSESSMENT — PAIN SCALES - GENERAL
PAINLEVEL_OUTOF10: 0 - NO PAIN

## 2024-02-16 NOTE — PROGRESS NOTES
"Tesfaye Milton is a 78 y.o. male on day 3 of admission presenting with Chronic systolic (congestive) heart failure (CMS/HCC).    Subjective   Is doing well   this morning not having any fever no chest pain mild shortness of breath occasional cough no sputum production     Objective     Physical Exam     General appears the patient looks in mild distress    Head and neck examination shows dry mucous membrane JVP is elevated he is slightly pale  Neck also showed that he has central tracking  Lung examination shows diffuse wheezes bilaterally with bibasilar crackles  Cardiac examination showed a regular rhythm with S3 and systolic ejection murmur.  Abdomen obese with a fluid thrill consistent with ascites organomegaly could not be felt but soft nontender  Edema lower extremity was noted with signs of chronic venous insufficiency  Neurologically he is alert oriented conscious x 4 without focal deficit.  Musculoskeletal there is no obvious deformity    Last Recorded Vitals  Blood pressure 107/74, pulse 104, temperature 35.9 °C (96.6 °F), temperature source Temporal, resp. rate 15, height 1.905 m (6' 3\"), weight 116 kg (254 lb 13.6 oz), SpO2 96 %.  Intake/Output last 3 Shifts:  I/O last 3 completed shifts:  In: 2168.7 (18.8 mL/kg) [P.O.:580; I.V.:1488.7 (12.9 mL/kg); IV Piggyback:100]  Out: 3875 (33.5 mL/kg) [Urine:3875 (0.9 mL/kg/hr)]  Weight: 115.6 kg     Relevant Results  Scheduled medications  aspirin, 81 mg, oral, Daily  cefTRIAXone, 1 g, intravenous, q24h  clopidogrel, 75 mg, oral, Daily  lidocaine, 5 mL, infiltration, Once  [Held by provider] metoprolol succinate XL, 100 mg, oral, Daily  [Held by provider] metoprolol tartrate, 50 mg, oral, TID  pantoprazole, 40 mg, oral, Daily before breakfast   Or  pantoprazole, 40 mg, intravenous, Daily before breakfast  sennosides-docusate sodium, 2 tablet, oral, BID  [Held by provider] tamsulosin, 0.4 mg, oral, Daily      Continuous medications  DOBUTamine, 2.5-5 mcg/kg/min, " Last Rate: 5 mcg/kg/min (02/16/24 1018)  heparin, 0-4,000 Units/hr, Last Rate: 1,200 Units/hr (02/16/24 0546)  norepinephrine, 0.01-0.5 mcg/kg/min, Last Rate: 0.16 mcg/kg/min (02/16/24 1018)  oxygen,       PRN medications  Results for orders placed or performed during the hospital encounter of 02/13/24 (from the past 24 hour(s))   Troponin T   Result Value Ref Range    Troponin T, High Sensitivity 1,940 (HH) <=14 ng/L   aPTT   Result Value Ref Range    aPTT 31.9 22.0 - 32.5 seconds   CBC   Result Value Ref Range    WBC 8.8 4.4 - 11.3 x10*3/uL    nRBC 0.0 0.0 - 0.0 /100 WBCs    RBC 4.27 (L) 4.50 - 5.90 x10*6/uL    Hemoglobin 14.8 13.5 - 17.5 g/dL    Hematocrit 44.7 41.0 - 52.0 %     (H) 80 - 100 fL    MCH 34.7 (H) 26.0 - 34.0 pg    MCHC 33.1 32.0 - 36.0 g/dL    RDW 14.7 (H) 11.5 - 14.5 %    Platelets 197 150 - 450 x10*3/uL   aPTT   Result Value Ref Range    aPTT 48.9 (H) 22.0 - 32.5 seconds   Magnesium   Result Value Ref Range    Magnesium 2.90 1.60 - 3.10 mg/dL   Comprehensive metabolic panel   Result Value Ref Range    Glucose 136 (H) 65 - 99 mg/dL    Sodium 136 133 - 145 mmol/L    Potassium 4.9 3.4 - 5.1 mmol/L    Chloride 102 97 - 107 mmol/L    Bicarbonate 25 24 - 31 mmol/L    Urea Nitrogen 51 (H) 8 - 25 mg/dL    Creatinine 1.60 0.40 - 1.60 mg/dL    eGFR 44 (L) >60 mL/min/1.73m*2    Calcium 8.1 (L) 8.5 - 10.4 mg/dL    Albumin 2.7 (L) 3.5 - 5.0 g/dL    Alkaline Phosphatase 94 35 - 125 U/L    Total Protein 5.6 (L) 5.9 - 7.9 g/dL     (H) 5 - 40 U/L    Bilirubin, Total 0.8 0.1 - 1.2 mg/dL     (H) 5 - 40 U/L    Anion Gap 9 <=19 mmol/L   CBC and Auto Differential   Result Value Ref Range    WBC 9.2 4.4 - 11.3 x10*3/uL    nRBC 0.0 0.0 - 0.0 /100 WBCs    RBC 4.40 (L) 4.50 - 5.90 x10*6/uL    Hemoglobin 15.1 13.5 - 17.5 g/dL    Hematocrit 46.0 41.0 - 52.0 %     (H) 80 - 100 fL    MCH 34.3 (H) 26.0 - 34.0 pg    MCHC 32.8 32.0 - 36.0 g/dL    RDW 14.6 (H) 11.5 - 14.5 %    Platelets 218 150 - 450  x10*3/uL    Neutrophils % 82.6 40.0 - 80.0 %    Immature Granulocytes %, Automated 0.7 0.0 - 0.9 %    Lymphocytes % 5.5 13.0 - 44.0 %    Monocytes % 10.6 2.0 - 10.0 %    Eosinophils % 0.4 0.0 - 6.0 %    Basophils % 0.2 0.0 - 2.0 %    Neutrophils Absolute 7.58 (H) 1.60 - 5.50 x10*3/uL    Immature Granulocytes Absolute, Automated 0.06 0.00 - 0.50 x10*3/uL    Lymphocytes Absolute 0.50 (L) 0.80 - 3.00 x10*3/uL    Monocytes Absolute 0.97 (H) 0.05 - 0.80 x10*3/uL    Eosinophils Absolute 0.04 0.00 - 0.40 x10*3/uL    Basophils Absolute 0.02 0.00 - 0.10 x10*3/uL   aPTT   Result Value Ref Range    aPTT 37.4 (H) 22.0 - 32.5 seconds     XR chest 1 view    Result Date: 2/13/2024  Interpreted By:  Nolberto Joy, STUDY: XR CHEST 1 VIEW; 2/13/2024 3:11 pm   INDICATION: Signs/Symptoms:shortness of breath.   COMPARISON: 10/28/2021 and 01/04/2019   ACCESSION NUMBER(S): [id number]   ORDERING CLINICIAN: ADORE RIVERA   TECHNIQUE: 1 view of the chest was performed.   FINDINGS: The lungs are adequately inflated. No acute consolidation. No pleural effusion. No pneumothorax.  The cardiomediastinal silhouette is mildly enlarged.       Mild cardiomegaly. No acute cardiopulmonary disease.   Signed by: Nolberto Joy 2/13/2024 3:46 PM Dictation workstation:   [id number]    Transthoracic Echo (TTE) Complete    Result Date: 2/13/2024           [place] [address]            Phone [phone] TRANSTHORACIC ECHOCARDIOGRAM REPORT  Patient Name:      YAS Ornelas Physician:    [id number] Wayne Armijo MD Study Date:        2/13/2024             Ordering Provider:    [id number] OPAL NEVAREZ MRN/PID:           [id number]              Fellow: Accession#:        [id number]          Nurse: Date of Birth/Age: 1945 / 78 years  Sonographer:          Rustam  Jean                                                                [facility] Gender:            M                     Additional Staff: Height:            188.00 cm             Admit Date: Weight:            110.00 kg             Admission Status:     Inpatient - STAT BSA / BMI:         2.36 m2 / 31.12 kg/m2 Department Location:  [facility] Blood Pressure: 112 /101 mmHg Study Type:    TRANSTHORACIC ECHO (TTE) COMPLETE Diagnosis/ICD: Chronic systolic (congestive) heart failure (CHF)-I50.22 Indication:    Congestive Heart Failure CPT Codes:     Echo Complete w Full [id number] Patient History: Smoker:            Current. Pertinent History: CAD, Chest Pain, CHF, HTN, Hyperlipidemia, LE Edema, Murmur,                    Dyspnea and Syncope. PCI, HFrEF 40%, Abn Ekg. Study Detail: The following Echo studies were performed: 2D, M-Mode, Doppler and               color flow. Technically challenging study due to poor acoustic               windows and body habitus.  PHYSICIAN INTERPRETATION: Left Ventricle: Left ventricular systolic function is severely decreased, with an estimated ejection fraction of 20-25%. Wall motion is abnormal. The left ventricular cavity size is mildly dilated. Spectral Doppler shows an impaired relaxation pattern of left ventricular diastolic filling. LV Wall Scoring: The RCA distribution, entire apex, basal and mid inferolateral wall, basal anteroseptal segment, and mid anterolateral segment are hypokinetic. All remaining scored segments are normal. Left Atrium: The left atrium is moderately dilated. Right Ventricle: The right ventricle is slightly enlarged. There is mildly reduced right ventricular systolic function. Right Atrium: The right atrium is moderately dilated. Aortic Valve: The aortic valve is trileaflet. There is no evidence of aortic valve regurgitation. The peak instantaneous gradient of the aortic valve is 24.6 mmHg. The mean gradient of the aortic valve is 13.0 mmHg. Mitral Valve:  The mitral valve is normal in structure. There is moderate mitral valve regurgitation. Tricuspid Valve: The tricuspid valve is structurally normal. There is mild to moderate tricuspid regurgitation. The Doppler estimated RVSP is moderately elevated at 56.5 mmHg. Pulmonic Valve: The pulmonic valve is not well visualized. There is no indication of pulmonic valve regurgitation. Pericardium: There is no pericardial effusion noted. Aorta: The aortic root is normal.  CONCLUSIONS:  1. Left ventricular systolic function is severely decreased with a 20-25% estimated ejection fraction.  2. Multiple segmental abnormalities exist. See findings.  3. Spectral Doppler shows an impaired relaxation pattern of left ventricular diastolic filling.  4. There is mildly reduced right ventricular systolic function.  5. The left atrium is moderately dilated.  6. The right atrium is moderately dilated.  7. Moderate mitral valve regurgitation.  8. Mild to moderate tricuspid regurgitation.  9. Moderately elevated right ventricular systolic pressure. QUANTITATIVE DATA SUMMARY: 2D MEASUREMENTS:                           Normal Ranges: LAs:           6.70 cm    (2.7-4.0cm) IVSd:          0.98 cm    (0.6-1.1cm) LVPWd:         0.89 cm    (0.6-1.1cm) LVIDd:         6.52 cm    (3.9-5.9cm) LVIDs:         5.67 cm LV Mass Index: 111.3 g/m2 LV % FS        13.0 % LA VOLUME:                                Normal Ranges: LA Vol A4C:        316.5 ml    (22+/-6mL/m2) LA Vol A2C:        218.3 ml LA Vol BP:         309.3 ml LA Vol Index A4C:  134.2ml/m2 LA Vol Index A2C:  92.5 ml/m2 LA Vol Index BP:   131.1 ml/m2 LA Area A4C:       44.0 cm2 LA Area A2C:       43.0 cm2 LA Major Axis A4C: 5.2 cm LA Major Axis A2C: 7.2 cm LA Volume Index:   89.0 ml/m2 LA Vol A4C:        190.0 ml LA Vol A2C:        205.0 ml RA VOLUME BY A/L METHOD:                       Normal Ranges: RA Area A4C: 34.0 cm2 AORTA MEASUREMENTS:                    Normal Ranges: Asc Ao, d: 3.40 cm  (2.1-3.4cm) LV SYSTOLIC FUNCTION BY 2D PLANIMETRY (MOD):                     Normal Ranges: EF-A4C View: 34.3 % (>=55%) EF-A2C View: 23.4 % EF-Biplane:  26.5 % LV DIASTOLIC FUNCTION:                     Normal Ranges: MV Peak E: 0.96 m/s (0.7-1.2 m/s) MITRAL VALVE:                 Normal Ranges: MV DT: 194 msec (150-240msec) MITRAL INSUFFICIENCY:                           Normal Ranges: PISA Radius:  0.7 cm MR VTI:       93.10 cm MR Vmax:      380.00 cm/s MR Alias Murtaza: 38.5 cm/s MR Volume:    29.04 ml MR Flow Rt:   118.53 ml/s MR EROA:      0.31 cm2 AORTIC VALVE:                                    Normal Ranges: AoV Vmax:                2.48 m/s  (<=1.7m/s) AoV Peak P.6 mmHg (<20mmHg) AoV Mean P.0 mmHg (1.7-11.5mmHg) LVOT Max Murtaza:            0.59 m/s  (<=1.1m/s) AoV VTI:                 44.40 cm  (18-25cm) LVOT VTI:                8.97 cm LVOT Diameter:           2.00 cm   (1.8-2.4cm) AoV Area, VTI:           0.63 cm2  (2.5-5.5cm2) AoV Area,Vmax:           0.74 cm2  (2.5-4.5cm2) AoV Dimensionless Index: 0.20 AORTIC INSUFFICIENCY: AI Vmax:       2.62 m/s AI Half-time:  482 msec AI Decel Rate: 141.00 cm/s2  RIGHT VENTRICLE: RV Basal 5.50 cm RV Mid   4.42 cm RV Major 6.9 cm TAPSE:   12.4 mm RV s'    0.06 m/s TRICUSPID VALVE/RVSP:                             Normal Ranges: Peak TR Velocity: 3.22 m/s RV Syst Pressure: 56.5 mmHg (< 30mmHg) IVC Diam:         2.59 cm PULMONIC VALVE:                      Normal Ranges: PV Max Murtaza: 1.3 m/s  (0.6-0.9m/s) PV Max P.0 mmHg  [id number] Armijo MD Electronically signed on 2024 at 2:48:39 PM  Wall Scoring  ** Final **                          Assessment/Plan   Principal Problem:    Chronic systolic (congestive) heart failure (CMS/HCC)    Shock likely cardiogenic bedside echocardiogram showed ejection fraction 25% he has history of ischemic cardiomyopathy cardiology will be following he is currently in Levophed.  Also he was on  dobutamine.  Heparin was started yesterday by cardiology plan for left heart cath today.    Positive troponin ischemia left heart catheterization today   acute on chronic renal failure likely cardiorenal we will keep mean arterial pressure more than 65 and monitor urine output improving    Elevated liver enzymes likely due to liver ischemia improving     possible UTI the patient has no active symptoms we will start ceftriaxone empirically until infection is ruled out  Plan for insertion of PICC line and arterial line today     critical care time is 28 minutes                Tarek R Gharibeh, MD

## 2024-02-16 NOTE — PROGRESS NOTES
Tesfaye Milton is a 78 y.o. male on day 3 of admission presenting with Chronic systolic (congestive) heart failure (CMS/HCC).      Subjective   Patient is seen for acute kidney injury associated with cardiorenal syndrome patient was started on dobutamine drip as clinical condition continue to improve the patient voices no complaints no chest pain shortness of breath his urine output is fairly decent renal function continues to improve       Objective          Vitals 24HR  Heart Rate:  []   Temp:  [35.9 °C (96.6 °F)-37 °C (98.6 °F)]   Resp:  [11-46]   BP: ()/()   Weight:  [108 kg (238 lb 8.6 oz)-116 kg (254 lb 13.6 oz)]   SpO2:  [92 %-99 %]     Intake/Output last 3 Shifts:    Intake/Output Summary (Last 24 hours) at 2/16/2024 0906  Last data filed at 2/16/2024 0628  Gross per 24 hour   Intake 1146.69 ml   Output 2370 ml   Net -1223.31 ml         Physical Exam  Neck:      Vascular: No carotid bruit.   Cardiovascular:      Rate and Rhythm: Normal rate. Rhythm irregular.      Heart sounds: No murmur heard.     No friction rub. No gallop.   Pulmonary:      Breath sounds: No rhonchi or rales.   Chest:      Chest wall: No tenderness.   Abdominal:      General: There is no distension.      Tenderness: There is no abdominal tenderness. There is no guarding or rebound.   Musculoskeletal:         General: No swelling or tenderness.      Cervical back: Neck supple.      Right lower leg: No edema.      Left lower leg: No edema.   Lymphadenopathy:      Cervical: No cervical adenopathy.         Relevant Results               Assessment/Plan    Acute kidney injury his creatinine level continue to improve urine output is adequate the plan is to try to get him off pressors if his renal function goes back to normal next couple days patient will be  undergoing cardiac catheterization on Monday with cardiology  Hypotension secondary to cardiogenic shock continue with dobutamine try to wean off norepinephrine if  possible  High anion gap metabolic acidosis secondary to lactic acidosis it is resolved  Cardiomyopathy new ejection fraction 20 to 25%  Abnormal liver function tests most likely secondary to passive congestion liver function is improving need to monitor  Coronary artery disease status post stenting  Chronic atrial fibrillation  History of hypertension and hyperlipidemia          Eliceo Lomax MD

## 2024-02-16 NOTE — CARE PLAN
Problem: Heart Failure  Goal: Improved gas exchange this shift  Outcome: Progressing  Goal: Improved urinary output this shift  Outcome: Progressing  Goal: Reduction in peripheral edema within 24 hours  Outcome: Progressing  Goal: Report improvement of dyspnea/breathlessness this shift  Outcome: Progressing  Goal: Weight from fluid excess reduced over 2-3 days, then stabilize  Outcome: Progressing  Goal: Increase self care and/or family involvement in 24 hours  Outcome: Progressing     Problem: Skin  Goal: Decreased wound size/increased tissue granulation at next dressing change  Outcome: Progressing  Flowsheets (Taken 2/15/2024 1935)  Decreased wound size/increased tissue granulation at next dressing change: Utilize specialty bed per algorithm  Goal: Participates in plan/prevention/treatment measures  Outcome: Progressing  Flowsheets (Taken 2/15/2024 1935)  Participates in plan/prevention/treatment measures: Elevate heels  Goal: Prevent/manage excess moisture  Outcome: Progressing  Flowsheets (Taken 2/15/2024 1935)  Prevent/manage excess moisture: Moisturize dry skin  Goal: Prevent/minimize sheer/friction injuries  Outcome: Progressing  Flowsheets (Taken 2/15/2024 1935)  Prevent/minimize sheer/friction injuries:   Use pull sheet   HOB 30 degrees or less   Turn/reposition every 2 hours/use positioning/transfer devices  Goal: Promote/optimize nutrition  Outcome: Progressing  Flowsheets (Taken 2/15/2024 1935)  Promote/optimize nutrition: Monitor/record intake including meals  Goal: Promote skin healing  Outcome: Progressing  Flowsheets (Taken 2/15/2024 1935)  Promote skin healing: Turn/reposition every 2 hours/use positioning/transfer devices     Problem: Safety - Adult  Goal: Free from fall injury  Outcome: Progressing   The patient's goals for the shift include      The clinical goals for the shift include wean off pressors, no bleeding, no arrythmias, vitally stable    Over the shift, the patient did not make  progress toward the following goals. Barriers to progression include : current condition  Recommendations to address these barriers include: titrate pressors, call light and bed alarm on, check for bleeding

## 2024-02-16 NOTE — CONSULTS
Vascular Access Team  Consult     Visit Date: 2/16/2024      Patient Name: Tesfaye Milton         MRN: [id number]                Reason for Consult: PICC insertion            Assessment: Pt with peripherally infusing vasopressors. Renal clearance from Dr Lomax per RN           Plan: Place triple lumen PICC       Michelle Robles RN  2/16/2024  2:33 PM

## 2024-02-16 NOTE — PROGRESS NOTES
Patient not medically clear. Patient on pressors. Nephrology watching renal function. Patient will need cardiac catheterization on Monday. At this time the plan is to return home at discharge. Will follow.     **PATIENT WITH A SAFE DISCHARGE PLAN      Peggy Vuong RN

## 2024-02-16 NOTE — PROGRESS NOTES
Subjective Data:  Feeling better denies any chest pain or shortness of breath. Still on clear liquids.    Overnight Events:    none     Objective Data:  Last Recorded Vitals:  Vitals:    02/16/24 1515 02/16/24 1530 02/16/24 1545 02/16/24 1600   BP: 84/58 94/74 101/53 79/56   BP Location:    Left arm   Patient Position:    Lying   Pulse: (!) 117 (!) 127 (!) 115 (!) 127   Resp: 18 24 22 (!) 27   Temp:       TempSrc:       SpO2: 98% 99% 99% 98%   Weight:       Height:           Last Labs:  CBC - 2/16/2024:  5:11 AM  9.2 15.1 218    46.0      CMP - 2/16/2024:  5:11 AM  8.1 5.6 214 --- 0.8   _ 2.7 630 94      PTT - 2/16/2024: 11:10 AM  _   _ 46.5     HGBA1C   Date/Time Value Ref Range Status   07/09/2021 09:12 AM 5.2 % Final     Comment:          Diagnosis of Diabetes-Adults   Non-Diabetic: < or = 5.6%   Increased risk for developing diabetes: 5.7-6.4%   Diagnostic of diabetes: > or = 6.5%  .       Monitoring of Diabetes                Age (y)     Therapeutic Goal (%)   Adults:          >18           <7.0   Pediatrics:    13-18           <7.5                   7-12           <8.0                   0- 6            7.5-8.5   American Diabetes Association. Diabetes Care 33(S1), Jan 2010.       LDLCALC   Date/Time Value Ref Range Status   12/15/2023 10:28 AM 82 <=99 mg/dL Final     Comment:                                 Near   Borderline      AGE      Desirable  Optimal    High     High     Very High     0-19 Y     0 - 109     ---    110-129   >/= 130     ----    20-24 Y     0 - 119     ---    120-159   >/= 160     ----      >24 Y     0 -  99   100-129  130-159   160-189     >/=190     05/09/2020 04:24  65 - 130 MG/DL Final     VLDL   Date/Time Value Ref Range Status   12/15/2023 10:28 AM 17 0 - 40 mg/dL Final   09/20/2023 09:51 AM 14 0 - 40 mg/dL Final   06/30/2022 09:30 AM 19 0 - 40 mg/dL Final   01/18/2022 12:50 PM 21 0 - 40 mg/dL Final      Last I/O:  I/O last 3 completed shifts:  In: 2168.7 (18.8 mL/kg)  [P.O.:580; I.V.:1488.7 (12.9 mL/kg); IV Piggyback:100]  Out: 3875 (33.5 mL/kg) [Urine:3875 (0.9 mL/kg/hr)]  Weight: 115.6 kg     Past Cardiology Tests (Last 3 Years):  EKG:  Electrocardiogram, 12-lead PRN ACS symptoms 02/13/2024 (Preliminary)      ECG 12 Lead     Echo:  Transthoracic Echo (TTE) Complete 02/13/2024    Ejection Fractions:  EF   Date/Time Value Ref Range Status   02/13/2024 02:34 PM 27 %      Cath:  No results found for this or any previous visit from the past 1095 days.    Stress Test:  No results found for this or any previous visit from the past 1095 days.    Cardiac Imaging:  No results found for this or any previous visit from the past 1095 days.      Inpatient Medications:  Scheduled medications   Medication Dose Route Frequency    aspirin  81 mg oral Daily    cefTRIAXone  1 g intravenous q24h    clopidogrel  75 mg oral Daily    lidocaine  5 mL infiltration Once    [Held by provider] metoprolol succinate XL  100 mg oral Daily    [Held by provider] metoprolol tartrate  50 mg oral TID    pantoprazole  40 mg oral Daily before breakfast    Or    pantoprazole  40 mg intravenous Daily before breakfast    sennosides-docusate sodium  2 tablet oral BID    [Held by provider] tamsulosin  0.4 mg oral Daily     PRN medications   Medication    alteplase    heparin (porcine)     Continuous Medications   Medication Dose Last Rate    DOBUTamine  2.5-5 mcg/kg/min 5 mcg/kg/min (02/16/24 1018)    heparin  0-4,000 Units/hr 1,200 Units/hr (02/16/24 1643)    norepinephrine  0.01-0.5 mcg/kg/min 0.16 mcg/kg/min (02/16/24 1723)    oxygen           Physical Exam:  HEENT-KATHI,  Lungs bilateral rhonchi appreciated posteriorly.  Heart S1-S2 present with no murmurs abdomen soft and nontender extremity shows no evidence for pedal edema neuro is grossly nonfocal     Assessment/Plan   1.  Cardiogenic shock patient still remains on IV dobutamine drip at 5 mics per kilogram per minute and diuresing reasonably well.  His renal  function significantly improved back to 1.6.  Though not completely baseline.  He still remains on IV Levophed for hemodynamic support.  Will continue current therapy and once his blood pressure stabilizes we will see if we could able to come down on the Levophed drip keeping ionotropic support with dobutamine as he is tolerating it well with no arrythmias.  Left heart cath could not be accomplished today due to resolving renal failure improving but not at baseline.  Will proceed with left heart cath  on Monday and informed consent obtained  2. Known CAD-with possible NSTEMI on iv heparin and dual antiplatelet therapy.  3. JEFF-improving  Peripheral IV 02/13/24 20 G Left Forearm (Active)   Site Assessment Clean;Dry;Intact 02/16/24 1600   Dressing Type Transparent 02/16/24 1600   Line Status Infusing 02/16/24 1600   Dressing Status Clean;Dry;Occlusive 02/16/24 1600   Number of days: 3       Peripheral IV 02/13/24 20 G Proximal;Right Forearm (Active)   Site Assessment Clean;Dry;Intact 02/16/24 1600   Dressing Type Transparent 02/16/24 1600   Line Status Infusing 02/16/24 1600   Dressing Status Clean;Dry;Occlusive 02/16/24 1600   Number of days: 3       Peripheral IV 02/15/24 18 G Right Wrist (Active)   Site Assessment Clean;Dry;Intact 02/16/24 1600   Dressing Type Transparent 02/16/24 1600   Line Status Infusing 02/16/24 1600   Dressing Status Clean;Dry;Occlusive 02/16/24 1600   Number of days: 1       Urethral Catheter 16 Fr. (Active)   Site Assessment Clean;Skin intact 02/16/24 1600   Collection Container Standard drainage bag 02/16/24 1600   Securement Method Securing device (Describe) 02/16/24 1600   Reason for Continuing Urinary Catheterization accurate hourly measurement of urine volume in a critically ill patient that cannot be assessed by other volumes and urine collection strategies 02/16/24 1600   Output (mL) 125 mL 02/16/24 1600   Number of days: 3       Code Status:  Full Code    I spent 25 minutes in the  professional and overall care of this patient.        Wayne Armijo MD

## 2024-02-17 ENCOUNTER — APPOINTMENT (OUTPATIENT)
Dept: RADIOLOGY | Facility: HOSPITAL | Age: 79
DRG: 280 | End: 2024-02-17
Payer: MEDICARE

## 2024-02-17 LAB
ALBUMIN SERPL-MCNC: 2.7 G/DL (ref 3.5–5)
ALP BLD-CCNC: 87 U/L (ref 35–125)
ALT SERPL-CCNC: 512 U/L (ref 5–40)
ANION GAP BLDA CALCULATED.4IONS-SCNC: 6 MMO/L (ref 10–25)
ANION GAP SERPL CALC-SCNC: 12 MMOL/L
APPARATUS: ABNORMAL
APTT PPP: 54.4 SECONDS (ref 22–32.5)
ARTERIAL PATENCY WRIST A: POSITIVE
AST SERPL-CCNC: 208 U/L (ref 5–40)
BACTERIA BLD CULT: NORMAL
BACTERIA BLD CULT: NORMAL
BASE EXCESS BLDA CALC-SCNC: 6.4 MMOL/L (ref -2–3)
BASOPHILS # BLD AUTO: 0.01 X10*3/UL (ref 0–0.1)
BASOPHILS NFR BLD AUTO: 0.1 %
BILIRUB SERPL-MCNC: 0.7 MG/DL (ref 0.1–1.2)
BODY TEMPERATURE: 37 DEGREES CELSIUS
BUN SERPL-MCNC: 33 MG/DL (ref 8–25)
CA-I BLDA-SCNC: 1.09 MMOL/L (ref 1.1–1.33)
CALCIUM SERPL-MCNC: 7.7 MG/DL (ref 8.5–10.4)
CHLORIDE BLDA-SCNC: 103 MMOL/L (ref 98–107)
CHLORIDE SERPL-SCNC: 98 MMOL/L (ref 97–107)
CO2 SERPL-SCNC: 24 MMOL/L (ref 24–31)
CREAT SERPL-MCNC: 1 MG/DL (ref 0.4–1.6)
EGFRCR SERPLBLD CKD-EPI 2021: 78 ML/MIN/1.73M*2
EOSINOPHIL # BLD AUTO: 0.09 X10*3/UL (ref 0–0.4)
EOSINOPHIL NFR BLD AUTO: 1.3 %
EPAP CMH2O: 6 CM H2O
ERYTHROCYTE [DISTWIDTH] IN BLOOD BY AUTOMATED COUNT: 14.7 % (ref 11.5–14.5)
ERYTHROCYTE [DISTWIDTH] IN BLOOD BY AUTOMATED COUNT: 14.8 % (ref 11.5–14.5)
GLUCOSE BLDA-MCNC: 134 MG/DL (ref 74–99)
GLUCOSE SERPL-MCNC: 390 MG/DL (ref 65–99)
HCO3 BLDA-SCNC: 29.4 MMOL/L (ref 22–26)
HCT VFR BLD AUTO: 40.5 % (ref 41–52)
HCT VFR BLD AUTO: 41.9 % (ref 41–52)
HCT VFR BLD EST: 43 % (ref 41–52)
HGB BLD-MCNC: 13.2 G/DL (ref 13.5–17.5)
HGB BLD-MCNC: 13.7 G/DL (ref 13.5–17.5)
HGB BLDA-MCNC: 14.4 G/DL (ref 13.5–17.5)
IMM GRANULOCYTES # BLD AUTO: 0.04 X10*3/UL (ref 0–0.5)
IMM GRANULOCYTES NFR BLD AUTO: 0.6 % (ref 0–0.9)
INHALED O2 CONCENTRATION: 30 %
IPAP CMH2O: 12 CM H2O
LACTATE BLDA-SCNC: 1.7 MMOL/L (ref 0.4–2)
LYMPHOCYTES # BLD AUTO: 0.5 X10*3/UL (ref 0.8–3)
LYMPHOCYTES NFR BLD AUTO: 7.2 %
MAGNESIUM SERPL-MCNC: 2.7 MG/DL (ref 1.6–3.1)
MCH RBC QN AUTO: 34.9 PG (ref 26–34)
MCH RBC QN AUTO: 34.9 PG (ref 26–34)
MCHC RBC AUTO-ENTMCNC: 32.6 G/DL (ref 32–36)
MCHC RBC AUTO-ENTMCNC: 32.7 G/DL (ref 32–36)
MCV RBC AUTO: 107 FL (ref 80–100)
MCV RBC AUTO: 107 FL (ref 80–100)
MONOCYTES # BLD AUTO: 0.82 X10*3/UL (ref 0.05–0.8)
MONOCYTES NFR BLD AUTO: 11.7 %
NEUTROPHILS # BLD AUTO: 5.52 X10*3/UL (ref 1.6–5.5)
NEUTROPHILS NFR BLD AUTO: 79.1 %
NRBC BLD-RTO: 0 /100 WBCS (ref 0–0)
NRBC BLD-RTO: 0 /100 WBCS (ref 0–0)
NT-PROBNP SERPL-MCNC: 4263 PG/ML (ref 0–852)
OXYHGB MFR BLDA: 97.3 % (ref 94–98)
PCO2 BLDA: 36 MM HG (ref 38–42)
PH BLDA: 7.52 PH (ref 7.38–7.42)
PLATELET # BLD AUTO: 199 X10*3/UL (ref 150–450)
PLATELET # BLD AUTO: 204 X10*3/UL (ref 150–450)
PO2 BLDA: 114 MM HG (ref 85–95)
POTASSIUM BLDA-SCNC: 4.4 MMOL/L (ref 3.5–5.3)
POTASSIUM SERPL-SCNC: 4 MMOL/L (ref 3.4–5.1)
PROT SERPL-MCNC: 5.3 G/DL (ref 5.9–7.9)
RBC # BLD AUTO: 3.78 X10*6/UL (ref 4.5–5.9)
RBC # BLD AUTO: 3.93 X10*6/UL (ref 4.5–5.9)
SAO2 % BLDA: 98 % (ref 94–100)
SODIUM BLDA-SCNC: 134 MMOL/L (ref 136–145)
SODIUM SERPL-SCNC: 134 MMOL/L (ref 133–145)
SPECIMEN DRAWN FROM PATIENT: ABNORMAL
VENTILATOR MODE: ABNORMAL
VENTILATOR RATE: 14 BPM
WBC # BLD AUTO: 7 X10*3/UL (ref 4.4–11.3)
WBC # BLD AUTO: 7.6 X10*3/UL (ref 4.4–11.3)

## 2024-02-17 PROCEDURE — 2500000004 HC RX 250 GENERAL PHARMACY W/ HCPCS (ALT 636 FOR OP/ED): Performed by: INTERNAL MEDICINE

## 2024-02-17 PROCEDURE — 85027 COMPLETE CBC AUTOMATED: CPT | Performed by: INTERNAL MEDICINE

## 2024-02-17 PROCEDURE — 2500000005 HC RX 250 GENERAL PHARMACY W/O HCPCS

## 2024-02-17 PROCEDURE — 71045 X-RAY EXAM CHEST 1 VIEW: CPT

## 2024-02-17 PROCEDURE — 80053 COMPREHEN METABOLIC PANEL: CPT

## 2024-02-17 PROCEDURE — 83880 ASSAY OF NATRIURETIC PEPTIDE: CPT | Performed by: INTERNAL MEDICINE

## 2024-02-17 PROCEDURE — 85730 THROMBOPLASTIN TIME PARTIAL: CPT | Performed by: INTERNAL MEDICINE

## 2024-02-17 PROCEDURE — 99233 SBSQ HOSP IP/OBS HIGH 50: CPT | Performed by: INTERNAL MEDICINE

## 2024-02-17 PROCEDURE — 37799 UNLISTED PX VASCULAR SURGERY: CPT | Performed by: INTERNAL MEDICINE

## 2024-02-17 PROCEDURE — 71045 X-RAY EXAM CHEST 1 VIEW: CPT | Mod: FOREIGN READ | Performed by: RADIOLOGY

## 2024-02-17 PROCEDURE — 2500000005 HC RX 250 GENERAL PHARMACY W/O HCPCS: Performed by: INTERNAL MEDICINE

## 2024-02-17 PROCEDURE — 85025 COMPLETE CBC W/AUTO DIFF WBC: CPT

## 2024-02-17 PROCEDURE — 2500000001 HC RX 250 WO HCPCS SELF ADMINISTERED DRUGS (ALT 637 FOR MEDICARE OP)

## 2024-02-17 PROCEDURE — 2020000001 HC ICU ROOM DAILY

## 2024-02-17 PROCEDURE — C9113 INJ PANTOPRAZOLE SODIUM, VIA: HCPCS

## 2024-02-17 PROCEDURE — 83735 ASSAY OF MAGNESIUM: CPT

## 2024-02-17 PROCEDURE — 2500000001 HC RX 250 WO HCPCS SELF ADMINISTERED DRUGS (ALT 637 FOR MEDICARE OP): Performed by: INTERNAL MEDICINE

## 2024-02-17 PROCEDURE — 84132 ASSAY OF SERUM POTASSIUM: CPT | Performed by: INTERNAL MEDICINE

## 2024-02-17 PROCEDURE — 2500000004 HC RX 250 GENERAL PHARMACY W/ HCPCS (ALT 636 FOR OP/ED)

## 2024-02-17 RX ORDER — DIGOXIN 0.25 MG/ML
250 INJECTION INTRAMUSCULAR; INTRAVENOUS ONCE
Status: COMPLETED | OUTPATIENT
Start: 2024-02-17 | End: 2024-02-17

## 2024-02-17 RX ORDER — NOREPINEPHRINE BITARTRATE/D5W 8 MG/250ML
.01-.5 PLASTIC BAG, INJECTION (ML) INTRAVENOUS CONTINUOUS
Status: DISCONTINUED | OUTPATIENT
Start: 2024-02-17 | End: 2024-02-19 | Stop reason: HOSPADM

## 2024-02-17 RX ORDER — FUROSEMIDE 10 MG/ML
40 INJECTION INTRAMUSCULAR; INTRAVENOUS ONCE
Status: COMPLETED | OUTPATIENT
Start: 2024-02-17 | End: 2024-02-17

## 2024-02-17 RX ADMIN — HEPARIN SODIUM 1200 UNITS/HR: 10000 INJECTION, SOLUTION INTRAVENOUS at 12:41

## 2024-02-17 RX ADMIN — DIGOXIN 250 MCG: 0.25 INJECTION INTRAMUSCULAR; INTRAVENOUS at 22:45

## 2024-02-17 RX ADMIN — NOREPINEPHRINE BITARTRATE 0.14 MCG/KG/MIN: 8 INJECTION, SOLUTION INTRAVENOUS at 08:14

## 2024-02-17 RX ADMIN — CLOPIDOGREL BISULFATE 75 MG: 75 TABLET ORAL at 08:19

## 2024-02-17 RX ADMIN — PANTOPRAZOLE SODIUM 40 MG: 40 INJECTION, POWDER, FOR SOLUTION INTRAVENOUS at 06:01

## 2024-02-17 RX ADMIN — Medication 0.13 MCG/KG/MIN: at 16:26

## 2024-02-17 RX ADMIN — NOREPINEPHRINE BITARTRATE 0.16 MCG/KG/MIN: 8 INJECTION, SOLUTION INTRAVENOUS at 00:49

## 2024-02-17 RX ADMIN — SENNOSIDES AND DOCUSATE SODIUM 2 TABLET: 50; 8.6 TABLET ORAL at 20:33

## 2024-02-17 RX ADMIN — ASPIRIN 81 MG: 81 TABLET, COATED ORAL at 08:19

## 2024-02-17 RX ADMIN — METHYLPREDNISOLONE SODIUM SUCCINATE 40 MG: 40 INJECTION, POWDER, FOR SOLUTION INTRAMUSCULAR; INTRAVENOUS at 23:12

## 2024-02-17 RX ADMIN — FUROSEMIDE 40 MG: 10 INJECTION, SOLUTION INTRAMUSCULAR; INTRAVENOUS at 22:45

## 2024-02-17 RX ADMIN — SENNOSIDES AND DOCUSATE SODIUM 2 TABLET: 50; 8.6 TABLET ORAL at 08:19

## 2024-02-17 RX ADMIN — DOBUTAMINE IN DEXTROSE 5 MCG/KG/MIN: 400 INJECTION, SOLUTION INTRAVENOUS at 17:51

## 2024-02-17 ASSESSMENT — PAIN SCALES - GENERAL
PAINLEVEL_OUTOF10: 0 - NO PAIN
PAINLEVEL_OUTOF10: 0 - NO PAIN

## 2024-02-17 NOTE — PROGRESS NOTES
Subjective Data:  Patient did not symptoms of chest discomfort shortness of breath is feeling much better.  Is able to tolerate diet which was advanced last evening without much limitation.  His Spence catheter has very minimal blood-tinged urine    Overnight Events:    none     Objective Data:  Last Recorded Vitals:  Vitals:    02/17/24 1500 02/17/24 1515 02/17/24 1530 02/17/24 1600   BP: 110/60 110/85 104/67    Pulse: (!) 123 (!) 125 (!) 116    Resp: 19 20 24    Temp:    36.5 °C (97.7 °F)   TempSrc:    Oral   SpO2:       Weight:       Height:           Last Labs:  CBC - 2/17/2024:  4:21 AM  7.0 13.2 199    40.5      CMP - 2/17/2024:  4:21 AM  7.7 5.3 208 --- 0.7   _ 2.7 512 87      PTT - 2/17/2024:  4:21 AM  _   _ 54.4     HGBA1C   Date/Time Value Ref Range Status   07/09/2021 09:12 AM 5.2 % Final     Comment:          Diagnosis of Diabetes-Adults   Non-Diabetic: < or = 5.6%   Increased risk for developing diabetes: 5.7-6.4%   Diagnostic of diabetes: > or = 6.5%  .       Monitoring of Diabetes                Age (y)     Therapeutic Goal (%)   Adults:          >18           <7.0   Pediatrics:    13-18           <7.5                   7-12           <8.0                   0- 6            7.5-8.5   American Diabetes Association. Diabetes Care 33(S1), Jan 2010.       LDLCALC   Date/Time Value Ref Range Status   12/15/2023 10:28 AM 82 <=99 mg/dL Final     Comment:                                 Near   Borderline      AGE      Desirable  Optimal    High     High     Very High     0-19 Y     0 - 109     ---    110-129   >/= 130     ----    20-24 Y     0 - 119     ---    120-159   >/= 160     ----      >24 Y     0 -  99   100-129  130-159   160-189     >/=190     05/09/2020 04:24  65 - 130 MG/DL Final     VLDL   Date/Time Value Ref Range Status   12/15/2023 10:28 AM 17 0 - 40 mg/dL Final   09/20/2023 09:51 AM 14 0 - 40 mg/dL Final   06/30/2022 09:30 AM 19 0 - 40 mg/dL Final   01/18/2022 12:50 PM 21 0 - 40 mg/dL  Final      Last I/O:  I/O last 3 completed shifts:  In: 1786.4 (16 mL/kg) [P.O.:1100; I.V.:586.4 (5.3 mL/kg); IV Piggyback:100]  Out: 3925 (35.2 mL/kg) [Urine:3925 (1 mL/kg/hr)]  Weight: 111.6 kg     Past Cardiology Tests (Last 3 Years):  EKG:  Electrocardiogram, 12-lead PRN ACS symptoms 02/13/2024 (Preliminary)      ECG 12 Lead     Echo:  Transthoracic Echo (TTE) Complete 02/13/2024    Ejection Fractions:  EF   Date/Time Value Ref Range Status   02/13/2024 02:34 PM 27 %      Cath:  No results found for this or any previous visit from the past 1095 days.    Stress Test:  No results found for this or any previous visit from the past 1095 days.    Cardiac Imaging:  No results found for this or any previous visit from the past 1095 days.      Inpatient Medications:  Scheduled medications   Medication Dose Route Frequency    aspirin  81 mg oral Daily    clopidogrel  75 mg oral Daily    lidocaine  5 mL infiltration Once    [Held by provider] metoprolol succinate XL  100 mg oral Daily    [Held by provider] metoprolol tartrate  50 mg oral TID    oxygen   inhalation Continuous - 02/gases    pantoprazole  40 mg oral Daily before breakfast    Or    pantoprazole  40 mg intravenous Daily before breakfast    sennosides-docusate sodium  2 tablet oral BID    [Held by provider] tamsulosin  0.4 mg oral Daily     PRN medications   Medication    alteplase    heparin (porcine)     Continuous Medications   Medication Dose Last Rate    DOBUTamine  2.5-5 mcg/kg/min 5 mcg/kg/min (02/16/24 1018)    heparin  0-4,000 Units/hr 1,200 Units/hr (02/17/24 1241)    norepinephrine  0.01-0.5 mcg/kg/min      oxygen           Physical Exam:  HEENT PRL neck is supple lungs bilateral rhonchi appreciated.  Heart S1-S2 present no murmurs abdomen soft and nontender extremity shows no evidence of pedal edema     Assessment/Plan   1.  Cardiogenic shock with acute on chronic LV systolic dysfunction.  Patient did had elevated-troponins but those are flat in  trend and initially got worse as patient went into acute to necrosis from hypotension prior to his arrival to the hospital.  As the ATN resolved his renal function is almost back to baseline today.  Patient still remains on IV dobutamine 5 mics per kilogram per minute and tolerated well without any evidence of ventricular arrhythmias.  He still remains on small dose of IV Levophed which will be gradually weaned off keeping the MAP above 60.  My goal is to continue patient on IV dobutamine for now and will arrange for a left heart cath on Monday morning and further management will based upon the findings of the left heart catheterization.  I have informed the patient as well as the patient's wife Misti regarding the procedure and the risk and benefit of procedure including but not limited to minor major risk of infection bleeding allergy to dye, renal failure CVA myocardial infarction possible coronary artery bypass surgery and the possibility of need to transfer to a tertiary facility should in case patient would need surgical revascularization with underlying severe depressed LV systolic function.  patient and patient's family clearly understands and consent to proceed with this plan.  PICC - Adult 02/16/24 Triple lumen Right Basilic vein (Active)   Line Necessity Intravenous medication therapy 02/17/24 0823   Line Necessity Reviewed With intensivist 02/17/24 0823   Site Assessment Clean;Dry;Intact 02/17/24 0823   Dressing Status Clean;Dry;Occlusive 02/17/24 0823   Number of days: 1       Peripheral IV 02/13/24 20 G Left Forearm (Active)   Site Assessment Clean;Dry;Intact 02/17/24 0824   Dressing Status Clean;Dry;Occlusive 02/17/24 0824   Number of days: 4       Urethral Catheter 16 Fr. (Active)   Site Assessment Clean;Skin intact 02/17/24 1620   Number of days: 4       Code Status:  Full Code    I spent 25 minutes in the professional and overall care of this patient.        Wayne Armijo MD

## 2024-02-17 NOTE — PROGRESS NOTES
Tesfaye Milton is a 78 y.o. male on day 4 of admission presenting with Chronic systolic (congestive) heart failure (CMS/HCC).      Subjective   Patient is seen for acute kidney injury associated with cardiorenal syndrome    Patient still on dobutamine drip clinically he feels a whole lot better no chest pain no shortness of breath urine output is good    Objective          Vitals 24HR  Heart Rate:  []   Temp:  [35.8 °C (96.4 °F)-36.7 °C (98.1 °F)]   Resp:  [12-32]   BP: ()/()   Weight:  [112 kg (246 lb 0.5 oz)-116 kg (254 lb 13.6 oz)]   SpO2:  [90 %-99 %]     Intake/Output last 3 Shifts:    Intake/Output Summary (Last 24 hours) at 2/17/2024 1259  Last data filed at 2/17/2024 1200  Gross per 24 hour   Intake 550 ml   Output 2675 ml   Net -2125 ml         Physical Exam  Neck:      Vascular: No carotid bruit.   Cardiovascular:      Rate and Rhythm: Normal rate. Rhythm irregular.      Heart sounds: No murmur heard.     No friction rub. No gallop.   Pulmonary:      Breath sounds: No rhonchi or rales.   Chest:      Chest wall: No tenderness.   Abdominal:      General: There is no distension.      Tenderness: There is no abdominal tenderness. There is no guarding or rebound.   Musculoskeletal:         General: No swelling or tenderness.      Cervical back: Neck supple.      Right lower leg: No edema.      Left lower leg: No edema.   Lymphadenopathy:      Cervical: No cervical adenopathy.         Relevant Results               Assessment/Plan    Acute kidney injury his creatinine level continue to improve as a matter fact it is back to normal with cardiology okay to proceed with cardiac catheterization Monday  High anion gap metabolic acidosis secondary to lactic acidosis it is resolved  Cardiomyopathy new ejection fraction 20 to 25%  Abnormal liver function tests most likely secondary to passive congestion liver function is improving need to monitor  Coronary artery disease status post stenting  Chronic  atrial fibrillation  History of hypertension and hyperlipidemia          Eliceo Lomax MD

## 2024-02-17 NOTE — PROGRESS NOTES
"Tesfaye Milton is a 78 y.o. male on day 4 of admission presenting with Chronic systolic (congestive) heart failure (CMS/HCC).    Subjective   Patient doing well has no specific complaints he slept well last night.       Objective     Physical Exam  General appears the patient looks in mild distress [place] tachypneic  Head and neck examination shows dry mucous membrane JVP is elevated he is slightly pale  Neck also showed that he has central tracking  Lung examination shows diffuse wheezes bilaterally with bibasilar crackles  Cardiac examination showed a regular rhythm with S3 and systolic ejection murmur.  Abdomen obese with a fluid thrill consistent with ascites organomegaly could not be felt but soft nontender  Edema lower extremity was noted with signs of chronic venous insufficiency  Neurologically he is alert oriented conscious x 4 without focal deficit.  Musculoskeletal there is no obvious deformity  Last Recorded Vitals  Blood pressure 107/68, pulse (!) 124, temperature 35.8 °C (96.4 °F), temperature source Temporal, resp. rate 14, height 1.905 m (6' 3\"), weight 112 kg (246 lb 0.5 oz), SpO2 93 %.  Intake/Output last 3 Shifts:  I/O last 3 completed shifts:  In: 1786.4 (16 mL/kg) [P.O.:1100; I.V.:586.4 (5.3 mL/kg); IV Piggyback:100]  Out: 3925 (35.2 mL/kg) [Urine:3925 (1 mL/kg/hr)]  Weight: 111.6 kg     Relevant Results  Scheduled medications  aspirin, 81 mg, oral, Daily  clopidogrel, 75 mg, oral, Daily  lidocaine, 5 mL, infiltration, Once  [Held by provider] metoprolol succinate XL, 100 mg, oral, Daily  [Held by provider] metoprolol tartrate, 50 mg, oral, TID  oxygen, , inhalation, Continuous - 02/gases  pantoprazole, 40 mg, oral, Daily before breakfast   Or  pantoprazole, 40 mg, intravenous, Daily before breakfast  sennosides-docusate sodium, 2 tablet, oral, BID  [Held by provider] tamsulosin, 0.4 mg, oral, Daily      Continuous medications  DOBUTamine, 2.5-5 mcg/kg/min, Last Rate: 5 mcg/kg/min (02/16/24 " 1018)  heparin, 0-4,000 Units/hr, Last Rate: 1,200 Units/hr (02/17/24 1241)  norepinephrine, 0.01-0.5 mcg/kg/min, Last Rate: 0.14 mcg/kg/min (02/17/24 1020)  oxygen,       PRN medications  PRN medications: alteplase, heparin (porcine)  Results for orders placed or performed during the hospital encounter of 02/13/24 (from the past 24 hour(s))   CBC and Auto Differential   Result Value Ref Range    WBC 7.0 4.4 - 11.3 x10*3/uL    nRBC 0.0 0.0 - 0.0 /100 WBCs    RBC 3.78 (L) 4.50 - 5.90 x10*6/uL    Hemoglobin 13.2 (L) 13.5 - 17.5 g/dL    Hematocrit 40.5 (L) 41.0 - 52.0 %     (H) 80 - 100 fL    MCH 34.9 (H) 26.0 - 34.0 pg    MCHC 32.6 32.0 - 36.0 g/dL    RDW 14.7 (H) 11.5 - 14.5 %    Platelets 199 150 - 450 x10*3/uL    Neutrophils % 79.1 40.0 - 80.0 %    Immature Granulocytes %, Automated 0.6 0.0 - 0.9 %    Lymphocytes % 7.2 13.0 - 44.0 %    Monocytes % 11.7 2.0 - 10.0 %    Eosinophils % 1.3 0.0 - 6.0 %    Basophils % 0.1 0.0 - 2.0 %    Neutrophils Absolute 5.52 (H) 1.60 - 5.50 x10*3/uL    Immature Granulocytes Absolute, Automated 0.04 0.00 - 0.50 x10*3/uL    Lymphocytes Absolute 0.50 (L) 0.80 - 3.00 x10*3/uL    Monocytes Absolute 0.82 (H) 0.05 - 0.80 x10*3/uL    Eosinophils Absolute 0.09 0.00 - 0.40 x10*3/uL    Basophils Absolute 0.01 0.00 - 0.10 x10*3/uL   Comprehensive metabolic panel   Result Value Ref Range    Glucose 390 (H) 65 - 99 mg/dL    Sodium 134 133 - 145 mmol/L    Potassium 4.0 3.4 - 5.1 mmol/L    Chloride 98 97 - 107 mmol/L    Bicarbonate 24 24 - 31 mmol/L    Urea Nitrogen 33 (H) 8 - 25 mg/dL    Creatinine 1.00 0.40 - 1.60 mg/dL    eGFR 78 >60 mL/min/1.73m*2    Calcium 7.7 (L) 8.5 - 10.4 mg/dL    Albumin 2.7 (L) 3.5 - 5.0 g/dL    Alkaline Phosphatase 87 35 - 125 U/L    Total Protein 5.3 (L) 5.9 - 7.9 g/dL     (H) 5 - 40 U/L    Bilirubin, Total 0.7 0.1 - 1.2 mg/dL     (H) 5 - 40 U/L    Anion Gap 12 <=19 mmol/L   Magnesium   Result Value Ref Range    Magnesium 2.70 1.60 - 3.10 mg/dL    aPTT   Result Value Ref Range    aPTT 54.4 (H) 22.0 - 32.5 seconds                           Assessment/Plan   Principal Problem:    Chronic systolic (congestive) heart failure (CMS/HCC)    Shock likely cardiogenic bedside echocardiogram showed ejection fraction 25% he has history of ischemic cardiomyopathy cardiology will be following he is currently in Levophed.  The cause of current decompensation could be acute acute ischemic event versus infection.  Positive troponin ischemia versus decompensated heart failure will continue antiplatelet  and cardiology is following  Acute on chronic renal failure likely cardiorenal recovered we will keep mean arterial pressure more than 65 and monitor urine output     Elevated liver enzymes likely due to liver congestion secondary to right-sided heart failure we will trend liver enzymes improving     Possible UTI the patient has no active symptoms  Finished 5 days of ceftriaxone  Plan for left heart catheterization by cardiology on Monday  From the ICU standpoint we will just monitor and follow recommendations from cardiology  Critical care time is 20 minutes       I spent 25 minutes in the professional and overall care of this patient.      Tarek R Gharibeh, MD

## 2024-02-17 NOTE — CARE PLAN
Problem: Heart Failure  Goal: Improved gas exchange this shift  Outcome: Progressing  Flowsheets (Taken 2/16/2024 2005)  Improved gas exchange this shift:   Assist with pulmonary hygiene and secretion clearance   Position to promote circulation/maximize ventilation  Goal: Improved urinary output this shift  Outcome: Progressing  Flowsheets (Taken 2/16/2024 2005)  Improved urinary output this shift:   Med administration/monitor effect   Monitor intake/output and daily weight  Goal: Reduction in peripheral edema within 24 hours  Outcome: Progressing  Flowsheets (Taken 2/16/2024 2005)  Reduction in peripheral edema within 24 hours:   Frequent small meals/supplements per order   Monitor edema/skin/mucous membrane integrity  Goal: Report improvement of dyspnea/breathlessness this shift  Outcome: Progressing  Flowsheets (Taken 2/16/2024 2005)  Report improvement of dyspnea/breathlessness this shift:   Ambulate/OOB 3 times daily   Incentive spirometry/deep breathing /HOB elevated elevated  Goal: Weight from fluid excess reduced over 2-3 days, then stabilize  Outcome: Progressing  Flowsheets (Taken 2/16/2024 2005)  Weight from fluid excess reduced over 2-3 days, then stabilize:   Med administration/monitor effect   Monitor bowel elimination  Goal: Increase self care and/or family involvement in 24 hours  Outcome: Progressing  Flowsheets (Taken 2/16/2024 2005)  Increase self care and/or family involvement in 24 hours:   Recognize current coping skills and assist to develop new coping skills   Assess for signs/symptoms of depression     Problem: Skin  Goal: Decreased wound size/increased tissue granulation at next dressing change  Outcome: Progressing  Flowsheets (Taken 2/16/2024 2005)  Decreased wound size/increased tissue granulation at next dressing change:   Promote sleep for wound healing   Protective dressings over bony prominences  Goal: Participates in plan/prevention/treatment measures  Outcome:  Progressing  Flowsheets (Taken 2/16/2024 2005)  Participates in plan/prevention/treatment measures:   Discuss with provider PT/OT consult   Elevate heels  Goal: Prevent/manage excess moisture  Outcome: Progressing  Flowsheets (Taken 2/16/2024 2005)  Prevent/manage excess moisture:   Cleanse incontinence/protect with barrier cream   Follow provider orders for dressing changes  Goal: Prevent/minimize sheer/friction injuries  Outcome: Progressing  Flowsheets (Taken 2/16/2024 2005)  Prevent/minimize sheer/friction injuries:   Use pull sheet   HOB 30 degrees or less   Utilize specialty bed per algorithm  Goal: Promote/optimize nutrition  Outcome: Progressing  Flowsheets (Taken 2/16/2024 2005)  Promote/optimize nutrition:   Assist with feeding   Monitor/record intake including meals  Goal: Promote skin healing  Outcome: Progressing  Flowsheets (Taken 2/16/2024 2005)  Promote skin healing:   Assess skin/pad under line(s)/device(s)   Turn/reposition every 2 hours/use positioning/transfer devices     Problem: Safety - Adult  Goal: Free from fall injury  Outcome: Progressing  Flowsheets (Taken 2/16/2024 2005)  Free from fall injury:   Instruct family/caregiver on patient safety   Based on caregiver fall risk screen, instruct family/caregiver to ask for assistance with transferring infant if caregiver noted to have fall risk factors     Problem: Discharge Planning  Goal: Discharge to home or other facility with appropriate resources  Outcome: Progressing  Flowsheets (Taken 2/16/2024 2005)  Discharge to home or other facility with appropriate resources: Identify barriers to discharge with patient and caregiver     Problem: Respiratory  Goal: Minimize anxiety/maximize coping throughout shift  Outcome: Progressing  Flowsheets (Taken 2/16/2024 2005)  Minimize anxiety/maximize coping throughout shift: Med administration/monitoring of effect  Goal: Minimal/no exertional discomfort or dyspnea this shift  Outcome:  Progressing  Flowsheets (Taken 2/16/2024 2005)  Minimal/no exertional discomfort or dyspnea this shift: Positioning to promote ventilation/comfort  Goal: Tolerate mechanical ventilation evidenced by VS/agitation level this shift  Outcome: Progressing   The patient's goals for the shift include      The clinical goals for the shift include hemodynamically stable    Over the shift, the patient did not make progress toward the following goals. Barriers to progression include . Recommendations to address these barriers include .

## 2024-02-18 LAB
ALBUMIN SERPL-MCNC: 3 G/DL (ref 3.5–5)
ALP BLD-CCNC: 91 U/L (ref 35–125)
ALT SERPL-CCNC: 452 U/L (ref 5–40)
ANION GAP SERPL CALC-SCNC: 11 MMOL/L
APTT PPP: 36.1 SECONDS (ref 22–32.5)
APTT PPP: 40.4 SECONDS (ref 22–32.5)
APTT PPP: >139 SECONDS (ref 22–32.5)
AST SERPL-CCNC: 192 U/L (ref 5–40)
BASOPHILS # BLD AUTO: 0.01 X10*3/UL (ref 0–0.1)
BASOPHILS NFR BLD AUTO: 0.1 %
BILIRUB SERPL-MCNC: 0.9 MG/DL (ref 0.1–1.2)
BUN SERPL-MCNC: 31 MG/DL (ref 8–25)
CALCIUM SERPL-MCNC: 8 MG/DL (ref 8.5–10.4)
CHLORIDE SERPL-SCNC: 98 MMOL/L (ref 97–107)
CO2 SERPL-SCNC: 26 MMOL/L (ref 24–31)
CREAT SERPL-MCNC: 0.9 MG/DL (ref 0.4–1.6)
EGFRCR SERPLBLD CKD-EPI 2021: 89 ML/MIN/1.73M*2
EOSINOPHIL # BLD AUTO: 0.01 X10*3/UL (ref 0–0.4)
EOSINOPHIL NFR BLD AUTO: 0.1 %
ERYTHROCYTE [DISTWIDTH] IN BLOOD BY AUTOMATED COUNT: 14.9 % (ref 11.5–14.5)
GLUCOSE BLD MANUAL STRIP-MCNC: 132 MG/DL (ref 74–99)
GLUCOSE BLD MANUAL STRIP-MCNC: 137 MG/DL (ref 74–99)
GLUCOSE BLD MANUAL STRIP-MCNC: 151 MG/DL (ref 74–99)
GLUCOSE BLD MANUAL STRIP-MCNC: 155 MG/DL (ref 74–99)
GLUCOSE SERPL-MCNC: 259 MG/DL (ref 65–99)
HCT VFR BLD AUTO: 41.5 % (ref 41–52)
HGB BLD-MCNC: 13.6 G/DL (ref 13.5–17.5)
IMM GRANULOCYTES # BLD AUTO: 0.06 X10*3/UL (ref 0–0.5)
IMM GRANULOCYTES NFR BLD AUTO: 0.7 % (ref 0–0.9)
LYMPHOCYTES # BLD AUTO: 0.19 X10*3/UL (ref 0.8–3)
LYMPHOCYTES NFR BLD AUTO: 2.3 %
MAGNESIUM SERPL-MCNC: 2.5 MG/DL (ref 1.6–3.1)
MCH RBC QN AUTO: 35.1 PG (ref 26–34)
MCHC RBC AUTO-ENTMCNC: 32.8 G/DL (ref 32–36)
MCV RBC AUTO: 107 FL (ref 80–100)
MONOCYTES # BLD AUTO: 0.16 X10*3/UL (ref 0.05–0.8)
MONOCYTES NFR BLD AUTO: 2 %
NEUTROPHILS # BLD AUTO: 7.76 X10*3/UL (ref 1.6–5.5)
NEUTROPHILS NFR BLD AUTO: 94.8 %
NRBC BLD-RTO: 0 /100 WBCS (ref 0–0)
PLATELET # BLD AUTO: 191 X10*3/UL (ref 150–450)
POTASSIUM SERPL-SCNC: 4.5 MMOL/L (ref 3.4–5.1)
PROT SERPL-MCNC: 5.6 G/DL (ref 5.9–7.9)
RBC # BLD AUTO: 3.88 X10*6/UL (ref 4.5–5.9)
SODIUM SERPL-SCNC: 135 MMOL/L (ref 133–145)
STAPHYLOCOCCUS SPEC CULT: NORMAL
WBC # BLD AUTO: 8.2 X10*3/UL (ref 4.4–11.3)

## 2024-02-18 PROCEDURE — 2500000004 HC RX 250 GENERAL PHARMACY W/ HCPCS (ALT 636 FOR OP/ED): Performed by: INTERNAL MEDICINE

## 2024-02-18 PROCEDURE — 85730 THROMBOPLASTIN TIME PARTIAL: CPT | Performed by: INTERNAL MEDICINE

## 2024-02-18 PROCEDURE — 2500000004 HC RX 250 GENERAL PHARMACY W/ HCPCS (ALT 636 FOR OP/ED)

## 2024-02-18 PROCEDURE — 83735 ASSAY OF MAGNESIUM: CPT

## 2024-02-18 PROCEDURE — 2500000005 HC RX 250 GENERAL PHARMACY W/O HCPCS: Performed by: INTERNAL MEDICINE

## 2024-02-18 PROCEDURE — 2500000001 HC RX 250 WO HCPCS SELF ADMINISTERED DRUGS (ALT 637 FOR MEDICARE OP)

## 2024-02-18 PROCEDURE — 37799 UNLISTED PX VASCULAR SURGERY: CPT | Performed by: INTERNAL MEDICINE

## 2024-02-18 PROCEDURE — 37799 UNLISTED PX VASCULAR SURGERY: CPT

## 2024-02-18 PROCEDURE — 94660 CPAP INITIATION&MGMT: CPT

## 2024-02-18 PROCEDURE — 84075 ASSAY ALKALINE PHOSPHATASE: CPT

## 2024-02-18 PROCEDURE — 85025 COMPLETE CBC W/AUTO DIFF WBC: CPT

## 2024-02-18 PROCEDURE — C9113 INJ PANTOPRAZOLE SODIUM, VIA: HCPCS

## 2024-02-18 PROCEDURE — 2020000001 HC ICU ROOM DAILY

## 2024-02-18 PROCEDURE — 82947 ASSAY GLUCOSE BLOOD QUANT: CPT

## 2024-02-18 PROCEDURE — 2500000001 HC RX 250 WO HCPCS SELF ADMINISTERED DRUGS (ALT 637 FOR MEDICARE OP): Performed by: INTERNAL MEDICINE

## 2024-02-18 RX ORDER — DEXTROSE 50 % IN WATER (D50W) INTRAVENOUS SYRINGE
25
Status: DISCONTINUED | OUTPATIENT
Start: 2024-02-18 | End: 2024-02-19 | Stop reason: HOSPADM

## 2024-02-18 RX ORDER — FUROSEMIDE 10 MG/ML
40 INJECTION INTRAMUSCULAR; INTRAVENOUS ONCE
Status: COMPLETED | OUTPATIENT
Start: 2024-02-19 | End: 2024-02-19

## 2024-02-18 RX ORDER — DEXTROSE MONOHYDRATE 100 MG/ML
0.3 INJECTION, SOLUTION INTRAVENOUS ONCE AS NEEDED
Status: DISCONTINUED | OUTPATIENT
Start: 2024-02-18 | End: 2024-02-19 | Stop reason: HOSPADM

## 2024-02-18 RX ORDER — INSULIN LISPRO 100 [IU]/ML
0-10 INJECTION, SOLUTION INTRAVENOUS; SUBCUTANEOUS
Status: DISCONTINUED | OUTPATIENT
Start: 2024-02-18 | End: 2024-02-19 | Stop reason: HOSPADM

## 2024-02-18 RX ADMIN — PIPERACILLIN SODIUM AND TAZOBACTAM SODIUM 3.38 G: 3; .375 INJECTION, SOLUTION INTRAVENOUS at 07:58

## 2024-02-18 RX ADMIN — PANTOPRAZOLE SODIUM 40 MG: 40 INJECTION, POWDER, FOR SOLUTION INTRAVENOUS at 05:39

## 2024-02-18 RX ADMIN — HEPARIN SODIUM 1100 UNITS/HR: 10000 INJECTION, SOLUTION INTRAVENOUS at 12:18

## 2024-02-18 RX ADMIN — METHYLPREDNISOLONE SODIUM SUCCINATE 40 MG: 40 INJECTION, POWDER, FOR SOLUTION INTRAMUSCULAR; INTRAVENOUS at 06:11

## 2024-02-18 RX ADMIN — ASPIRIN 81 MG: 81 TABLET, COATED ORAL at 08:43

## 2024-02-18 RX ADMIN — SENNOSIDES AND DOCUSATE SODIUM 2 TABLET: 50; 8.6 TABLET ORAL at 08:43

## 2024-02-18 RX ADMIN — PIPERACILLIN SODIUM AND TAZOBACTAM SODIUM 3.38 G: 3; .375 INJECTION, SOLUTION INTRAVENOUS at 13:35

## 2024-02-18 RX ADMIN — PIPERACILLIN SODIUM AND TAZOBACTAM SODIUM 3.38 G: 3; .375 INJECTION, SOLUTION INTRAVENOUS at 20:46

## 2024-02-18 RX ADMIN — METHYLPREDNISOLONE SODIUM SUCCINATE 40 MG: 40 INJECTION, POWDER, FOR SOLUTION INTRAMUSCULAR; INTRAVENOUS at 14:10

## 2024-02-18 RX ADMIN — METHYLPREDNISOLONE SODIUM SUCCINATE 40 MG: 40 INJECTION, POWDER, FOR SOLUTION INTRAMUSCULAR; INTRAVENOUS at 23:03

## 2024-02-18 RX ADMIN — Medication 0.01 MCG/KG/MIN: at 21:12

## 2024-02-18 RX ADMIN — CLOPIDOGREL BISULFATE 75 MG: 75 TABLET ORAL at 08:43

## 2024-02-18 ASSESSMENT — COGNITIVE AND FUNCTIONAL STATUS - GENERAL
CLIMB 3 TO 5 STEPS WITH RAILING: A LITTLE
DAILY ACTIVITIY SCORE: 24
MOBILITY SCORE: 22
WALKING IN HOSPITAL ROOM: A LITTLE

## 2024-02-18 ASSESSMENT — PAIN SCALES - GENERAL
PAINLEVEL_OUTOF10: 0 - NO PAIN
PAINLEVEL_OUTOF10: 0 - NO PAIN

## 2024-02-18 ASSESSMENT — PAIN - FUNCTIONAL ASSESSMENT: PAIN_FUNCTIONAL_ASSESSMENT: 0-10

## 2024-02-18 NOTE — PROGRESS NOTES
"Tesfaye Milton is a 78 y.o. male on day 5 of admission presenting with Chronic systolic (congestive) heart failure (CMS/HCC).    Subjective   Overnight he became short of breath and wheezy he was started on BiPAP and was given steroid presumed that he has COPD exacerbation.  This morning he is still wheezy he is little bit better.       Objective     Physical Exam  General appears the patient looks in mild distress [place] tachypneic  Head and neck examination shows dry mucous membrane JVP is elevated he is slightly pale  Neck also showed that he has central tracking  Lung examination shows diffuse wheezes bilaterally with bibasilar crackles  Cardiac examination showed a regular rhythm with S3 and systolic ejection murmur.  Abdomen obese with a fluid thrill consistent with ascites organomegaly could not be felt but soft nontender  Edema lower extremity was noted with signs of chronic venous insufficiency  Neurologically he is alert oriented conscious x 4 without focal deficit.  Musculoskeletal there is no obvious deformity  Last Recorded Vitals  Blood pressure 91/61, pulse (!) 123, temperature 36.5 °C (97.7 °F), temperature source Temporal, resp. rate 23, height 1.905 m (6' 3\"), weight 112 kg (246 lb 4.1 oz), SpO2 93 %.  Intake/Output last 3 Shifts:  I/O last 3 completed shifts:  In: 450 (4 mL/kg) [P.O.:400; IV Piggyback:50]  Out: 3925 (35.1 mL/kg) [Urine:3925 (1 mL/kg/hr)]  Weight: 111.7 kg     Relevant Results  Scheduled medications  aspirin, 81 mg, oral, Daily  clopidogrel, 75 mg, oral, Daily  insulin lispro, 0-10 Units, subcutaneous, TID with meals  lidocaine, 5 mL, infiltration, Once  methylPREDNISolone sodium succinate (PF), 40 mg, intravenous, q8h  [Held by provider] metoprolol succinate XL, 100 mg, oral, Daily  [Held by provider] metoprolol tartrate, 50 mg, oral, TID  oxygen, , inhalation, Continuous - 02/gases  pantoprazole, 40 mg, oral, Daily before breakfast   Or  pantoprazole, 40 mg, intravenous, Daily " before breakfast  piperacillin-tazobactam, 3.375 g, intravenous, q6h  sennosides-docusate sodium, 2 tablet, oral, BID  [Held by provider] tamsulosin, 0.4 mg, oral, Daily      Continuous medications  DOBUTamine, 2.5-5 mcg/kg/min, Last Rate: 5 mcg/kg/min (02/17/24 1751)  heparin, 0-4,000 Units/hr, Last Rate: 1,100 Units/hr (02/18/24 1218)  norepinephrine, 0.01-0.5 mcg/kg/min, Last Rate: Stopped (02/18/24 0654)  oxygen,       PRN medications  PRN medications: alteplase, dextrose 10 % in water (D10W), dextrose, glucagon, heparin (porcine)  Results for orders placed or performed during the hospital encounter of 02/13/24 (from the past 24 hour(s))   CBC   Result Value Ref Range    WBC 7.6 4.4 - 11.3 x10*3/uL    nRBC 0.0 0.0 - 0.0 /100 WBCs    RBC 3.93 (L) 4.50 - 5.90 x10*6/uL    Hemoglobin 13.7 13.5 - 17.5 g/dL    Hematocrit 41.9 41.0 - 52.0 %     (H) 80 - 100 fL    MCH 34.9 (H) 26.0 - 34.0 pg    MCHC 32.7 32.0 - 36.0 g/dL    RDW 14.8 (H) 11.5 - 14.5 %    Platelets 204 150 - 450 x10*3/uL   NT Pro-BNP   Result Value Ref Range    PROBNP 4,263 (H) 0 - 852 pg/mL   Blood Gas Arterial Full Panel   Result Value Ref Range    POCT pH, Arterial 7.52 (H) 7.38 - 7.42 pH    POCT pCO2, Arterial 36 (L) 38 - 42 mm Hg    POCT pO2, Arterial 114 (H) 85 - 95 mm Hg    POCT SO2, Arterial 98 94 - 100 %    POCT Oxy Hemoglobin, Arterial 97.3 94.0 - 98.0 %    POCT Hematocrit Calculated, Arterial 43.0 41.0 - 52.0 %    POCT Sodium, Arterial 134 (L) 136 - 145 mmol/L    POCT Potassium, Arterial 4.4 3.5 - 5.3 mmol/L    POCT Chloride, Arterial 103 98 - 107 mmol/L    POCT Ionized Calcium, Arterial 1.09 (L) 1.10 - 1.33 mmol/L    POCT Glucose, Arterial 134 (H) 74 - 99 mg/dL    POCT Lactate, Arterial 1.7 0.4 - 2.0 mmol/L    POCT Base Excess, Arterial 6.4 (H) -2.0 - 3.0 mmol/L    POCT HCO3 Calculated, Arterial 29.4 (H) 22.0 - 26.0 mmol/L    POCT Hemoglobin, Arterial 14.4 13.5 - 17.5 g/dL    POCT Anion Gap, Arterial 6 (L) 10 - 25 mmo/L    Patient  Temperature 37.0 degrees Celsius    FiO2 30 %    Apparatus FACE MASK     Ventilator Mode BiPAP     Ventilator Rate 14 bpm    Ipap CMH2O 12.0 cm H2O    Epap CMH2O 6.0 cm H2O    Site of Arterial Puncture Radial Right     Aftab's Test Positive    CBC and Auto Differential   Result Value Ref Range    WBC 8.2 4.4 - 11.3 x10*3/uL    nRBC 0.0 0.0 - 0.0 /100 WBCs    RBC 3.88 (L) 4.50 - 5.90 x10*6/uL    Hemoglobin 13.6 13.5 - 17.5 g/dL    Hematocrit 41.5 41.0 - 52.0 %     (H) 80 - 100 fL    MCH 35.1 (H) 26.0 - 34.0 pg    MCHC 32.8 32.0 - 36.0 g/dL    RDW 14.9 (H) 11.5 - 14.5 %    Platelets 191 150 - 450 x10*3/uL    Neutrophils % 94.8 40.0 - 80.0 %    Immature Granulocytes %, Automated 0.7 0.0 - 0.9 %    Lymphocytes % 2.3 13.0 - 44.0 %    Monocytes % 2.0 2.0 - 10.0 %    Eosinophils % 0.1 0.0 - 6.0 %    Basophils % 0.1 0.0 - 2.0 %    Neutrophils Absolute 7.76 (H) 1.60 - 5.50 x10*3/uL    Immature Granulocytes Absolute, Automated 0.06 0.00 - 0.50 x10*3/uL    Lymphocytes Absolute 0.19 (L) 0.80 - 3.00 x10*3/uL    Monocytes Absolute 0.16 0.05 - 0.80 x10*3/uL    Eosinophils Absolute 0.01 0.00 - 0.40 x10*3/uL    Basophils Absolute 0.01 0.00 - 0.10 x10*3/uL   Comprehensive metabolic panel   Result Value Ref Range    Glucose 259 (H) 65 - 99 mg/dL    Sodium 135 133 - 145 mmol/L    Potassium 4.5 3.4 - 5.1 mmol/L    Chloride 98 97 - 107 mmol/L    Bicarbonate 26 24 - 31 mmol/L    Urea Nitrogen 31 (H) 8 - 25 mg/dL    Creatinine 0.90 0.40 - 1.60 mg/dL    eGFR 89 >60 mL/min/1.73m*2    Calcium 8.0 (L) 8.5 - 10.4 mg/dL    Albumin 3.0 (L) 3.5 - 5.0 g/dL    Alkaline Phosphatase 91 35 - 125 U/L    Total Protein 5.6 (L) 5.9 - 7.9 g/dL     (H) 5 - 40 U/L    Bilirubin, Total 0.9 0.1 - 1.2 mg/dL     (H) 5 - 40 U/L    Anion Gap 11 <=19 mmol/L   Magnesium   Result Value Ref Range    Magnesium 2.50 1.60 - 3.10 mg/dL   aPTT   Result Value Ref Range    aPTT >139.0 (HH) 22.0 - 32.5 seconds   POCT GLUCOSE   Result Value Ref Range    POCT  Glucose 132 (H) 74 - 99 mg/dL   aPTT   Result Value Ref Range    aPTT 36.1 (H) 22.0 - 32.5 seconds   POCT GLUCOSE   Result Value Ref Range    POCT Glucose 151 (H) 74 - 99 mg/dL                           Assessment/Plan   Principal Problem:    Chronic systolic (congestive) heart failure (CMS/HCC)      Wheezy chest chest x-ray showed pulmonary venous congestion likely cardiac with not pulmonary wheezes or wheezes due to COPD we will change steroid to hydrocortisone continue diuresis.  Nephrologist and cardiologis.Need more definitive treatment plan for his cardiogenic shock.    Shock likely cardiogenic bedside echocardiogram showed ejection fraction 25% he has history of ischemic cardiomyopathy cardiology will be following he is currently in Levophed.      Positive troponin ischemia versus decompensated heart failure will continue antiplatelet  and cardiology is following    Acute on chronic renal failure likely cardiorenal recovered we will keep mean arterial pressure more than 65 and monitor urine output     Elevated liver enzymes likely due to liver congestion secondary to right-sided heart failure we will trend liver enzymes improving will restart statin     Possible pneumonia on chest x-ray although the patient does not have advanced symptoms we will start him on Zosyn empirically finished 5 days of ceftriaxone for UTI.    Plan for left heart catheterization by cardiology on Monday  I discussed with cardiology other treatment plans like intra-aortic balloon pump versus transfer to tertiary center    Critical care time is 20 minutes       I spent 25 minutes in the professional and overall care of this patient.      Tarek R Gharibeh, MD

## 2024-02-18 NOTE — CARE PLAN
Problem: Respiratory  Goal: Tolerate mechanical ventilation evidenced by VS/agitation level this shift  Outcome: Progressing     Problem: Respiratory  Goal: Clear secretions with interventions this shift  Outcome: Progressing     Problem: Respiratory  Goal: Patent airway maintained this shift  Outcome: Progressing

## 2024-02-18 NOTE — SIGNIFICANT EVENT
Date of exam: 02/17/24  I was contacted by Dr. Armijo.  He was worried about patient's low blood pressure and worsening respiratory status.  Currently patient is on BiPAP.  He sounds bad and he has some wheezing.  I went and examined the patient.  He admits to history of COPD and smoking.  On exam, he is in some respiratory distress, tolerated BiPAP.  Lungs: Diminished with some rhonchi and wheezes bilaterally.  Heart: Tachycardic irregular S1-S2.  Abdomen is obese.  Bowel sounds positive.  Extremities: +1 edema.  Impression and plan:  Acute respiratory failure.  Check blood gas.  I reviewed blood gas and I believe, patient will benefit from CPAP so we switched him to CPAP.  COPD, exacerbation, probable.  Will add systemic steroids.  Acute on chronic systolic CHF.  Management per cardiology.  Critical care time spent with patient 32 minutes.

## 2024-02-18 NOTE — PROGRESS NOTES
Tesfaye Milton is a 78 y.o. male on day 5 of admission presenting with Chronic systolic (congestive) heart failure (CMS/HCC).      Subjective   Patient is seen for acute kidney injury associated with cardiorenal syndrome    Patient still on dobutamine drip however norepinephrine drip was discontinued at [facility] he said he had slept 6 hours overnight he had not slept for 6 days he was given 1 dose of diuretic and he is in negative fluid balance    Objective          Vitals 24HR  Heart Rate:  [102-147]   Temp:  [36.4 °C (97.5 °F)-36.5 °C (97.7 °F)]   Resp:  [13-36]   BP: ()/()   Weight:  [112 kg (246 lb 0.5 oz)-112 kg (246 lb 4.1 oz)]   SpO2:  [80 %-99 %]     Intake/Output last 3 Shifts:    Intake/Output Summary (Last 24 hours) at 2/18/2024 1205  Last data filed at 2/18/2024 1147  Gross per 24 hour   Intake 450 ml   Output 2175 ml   Net -1725 ml         Physical Exam  Neck:      Vascular: No carotid bruit.   Cardiovascular:      Rate and Rhythm: Normal rate. Rhythm irregular.      Heart sounds: No murmur heard.     No friction rub. No gallop.   Pulmonary:      Breath sounds: No rhonchi or rales.   Chest:      Chest wall: No tenderness.   Abdominal:      General: There is no distension.      Tenderness: There is no abdominal tenderness. There is no guarding or rebound.   Musculoskeletal:         General: No swelling or tenderness.      Cervical back: Neck supple.      Right lower leg: No edema.      Left lower leg: No edema.   Lymphadenopathy:      Cervical: No cervical adenopathy.         Relevant Results               Assessment/Plan    Acute kidney injury resolved to have IV contrast tomorrow we will monitor his renal function postcontrast study  High anion gap metabolic acidosis secondary to lactic acidosis it is resolved  Cardiomyopathy new ejection fraction 20 to 25%  Abnormal liver function tests most likely secondary to passive congestion liver function is improving need to monitor  Coronary  artery disease status post stenting  Chronic atrial fibrillation  History of hypertension and hyperlipidemia          Eliceo Lomax MD

## 2024-02-18 NOTE — CARE PLAN
Problem: Heart Failure  Goal: Improved gas exchange this shift  Outcome: Progressing  Flowsheets (Taken 2/17/2024 1955)  Improved gas exchange this shift:   Assist with pulmonary hygiene and secretion clearance   Prepare for use of devices/procedures to correct dysrhythmia  Goal: Improved urinary output this shift  Outcome: Progressing  Flowsheets (Taken 2/17/2024 1955)  Improved urinary output this shift:   Med administration/monitor effect   Monitor serum electrolytes/replace per order  Goal: Reduction in peripheral edema within 24 hours  Outcome: Progressing  Flowsheets (Taken 2/17/2024 1955)  Reduction in peripheral edema within 24 hours:   Monitor edema/skin/mucous membrane integrity   SCDs/elevate extremities   Frequent small meals/supplements per order  Goal: Report improvement of dyspnea/breathlessness this shift  Outcome: Progressing  Flowsheets (Taken 2/17/2024 1955)  Report improvement of dyspnea/breathlessness this shift:   Encourage activity/mobility/ROM   Incentive spirometry/deep breathing /HOB elevated elevated  Goal: Weight from fluid excess reduced over 2-3 days, then stabilize  Outcome: Progressing  Flowsheets (Taken 2/17/2024 1955)  Weight from fluid excess reduced over 2-3 days, then stabilize:   Med administration/monitor effect   Monitor intake/output and daily weight   Monitor bowel elimination  Goal: Increase self care and/or family involvement in 24 hours  Outcome: Progressing  Flowsheets (Taken 2/17/2024 1955)  Increase self care and/or family involvement in 24 hours:   Organize tasks/allow time for rest   Assess for signs/symptoms of depression     Problem: Skin  Goal: Decreased wound size/increased tissue granulation at next dressing change  Outcome: Progressing  Flowsheets (Taken 2/17/2024 1955)  Decreased wound size/increased tissue granulation at next dressing change:   Promote sleep for wound healing   Utilize specialty bed per algorithm  Goal: Participates in  plan/prevention/treatment measures  Outcome: Progressing  Flowsheets (Taken 2/17/2024 1955)  Participates in plan/prevention/treatment measures:   Elevate heels   Discuss with provider PT/OT consult  Goal: Prevent/manage excess moisture  Outcome: Progressing  Flowsheets (Taken 2/17/2024 1955)  Prevent/manage excess moisture:   Cleanse incontinence/protect with barrier cream   Moisturize dry skin  Goal: Prevent/minimize sheer/friction injuries  Outcome: Progressing  Flowsheets (Taken 2/17/2024 1955)  Prevent/minimize sheer/friction injuries:   Complete micro-shifts as needed if patient unable. Adjust patient position to relieve pressure points, not a full turn   Increase activity/out of bed for meals  Goal: Promote/optimize nutrition  Outcome: Progressing  Flowsheets (Taken 2/17/2024 1955)  Promote/optimize nutrition:   Monitor/record intake including meals   Offer water/supplements/favorite foods  Goal: Promote skin healing  Outcome: Progressing  Flowsheets (Taken 2/17/2024 1955)  Promote skin healing:   Assess skin/pad under line(s)/device(s)   Protective dressings over bony prominences   Turn/reposition every 2 hours/use positioning/transfer devices     Problem: Safety - Adult  Goal: Free from fall injury  Outcome: Progressing  Flowsheets (Taken 2/17/2024 1955)  Free from fall injury:   Instruct family/caregiver on patient safety   Based on caregiver fall risk screen, instruct family/caregiver to ask for assistance with transferring infant if caregiver noted to have fall risk factors     Problem: Discharge Planning  Goal: Discharge to home or other facility with appropriate resources  Outcome: Progressing  Flowsheets (Taken 2/17/2024 1955)  Discharge to home or other facility with appropriate resources:   Identify barriers to discharge with patient and caregiver   Identify discharge learning needs (meds, wound care, etc)   Arrange for needed discharge resources and transportation as appropriate     Problem:  Respiratory  Goal: Minimize anxiety/maximize coping throughout shift  Outcome: Progressing  Flowsheets (Taken 2/17/2024 1955)  Minimize anxiety/maximize coping throughout shift: Med administration/monitoring of effect  Goal: Minimal/no exertional discomfort or dyspnea this shift  Outcome: Progressing  Flowsheets (Taken 2/17/2024 1955)  Minimal/no exertional discomfort or dyspnea this shift: Positioning to promote ventilation/comfort  Goal: Tolerate mechanical ventilation evidenced by VS/agitation level this shift  Outcome: Progressing   The patient's goals for the shift include      The clinical goals for the shift include hemodynamically stable    Over the shift, the patient did not make progress toward the following goals. Barriers to progression include . Recommendations to address these barriers include .

## 2024-02-19 ENCOUNTER — APPOINTMENT (OUTPATIENT)
Dept: RADIOLOGY | Facility: HOSPITAL | Age: 79
DRG: 270 | End: 2024-02-19
Payer: MEDICARE

## 2024-02-19 ENCOUNTER — HOSPITAL ENCOUNTER (INPATIENT)
Facility: HOSPITAL | Age: 79
LOS: 7 days | DRG: 270 | End: 2024-02-26
Attending: INTERNAL MEDICINE | Admitting: INTERNAL MEDICINE
Payer: MEDICARE

## 2024-02-19 ENCOUNTER — APPOINTMENT (OUTPATIENT)
Dept: CARDIOLOGY | Facility: HOSPITAL | Age: 79
DRG: 270 | End: 2024-02-19
Payer: MEDICARE

## 2024-02-19 VITALS
TEMPERATURE: 96.3 F | DIASTOLIC BLOOD PRESSURE: 59 MMHG | SYSTOLIC BLOOD PRESSURE: 90 MMHG | BODY MASS INDEX: 31.28 KG/M2 | HEIGHT: 75 IN | WEIGHT: 251.54 LBS | RESPIRATION RATE: 14 BRPM | OXYGEN SATURATION: 100 % | HEART RATE: 138 BPM

## 2024-02-19 DIAGNOSIS — I42.8 OTHER CARDIOMYOPATHIES (MULTI): ICD-10-CM

## 2024-02-19 DIAGNOSIS — R57.8 OTHER SHOCK (MULTI): ICD-10-CM

## 2024-02-19 DIAGNOSIS — R57.0 CARDIOGENIC SHOCK (MULTI): ICD-10-CM

## 2024-02-19 DIAGNOSIS — I21.4 NSTEMI (NON-ST ELEVATED MYOCARDIAL INFARCTION) (MULTI): ICD-10-CM

## 2024-02-19 DIAGNOSIS — R57.9 SHOCK (MULTI): Primary | ICD-10-CM

## 2024-02-19 DIAGNOSIS — I50.22 CHRONIC SYSTOLIC (CONGESTIVE) HEART FAILURE (MULTI): ICD-10-CM

## 2024-02-19 LAB
ABO GROUP (TYPE) IN BLOOD: NORMAL
ACT BLD: 182 SEC (ref 89–169)
ACT BLD: 247 SEC (ref 89–169)
ALBUMIN SERPL BCP-MCNC: 2.9 G/DL (ref 3.4–5)
ALBUMIN SERPL BCP-MCNC: 3 G/DL (ref 3.4–5)
ALBUMIN SERPL-MCNC: 3 G/DL (ref 3.5–5)
ALP BLD-CCNC: 92 U/L (ref 35–125)
ALP SERPL-CCNC: 79 U/L (ref 33–136)
ALP SERPL-CCNC: 83 U/L (ref 33–136)
ALT SERPL W P-5'-P-CCNC: 300 U/L (ref 10–52)
ALT SERPL W P-5'-P-CCNC: 314 U/L (ref 10–52)
ALT SERPL-CCNC: 363 U/L (ref 5–40)
ANION GAP BLDA CALCULATED.4IONS-SCNC: 14 MMO/L (ref 10–25)
ANION GAP BLDMV CALCULATED.4IONS-SCNC: 12 MMO/L (ref 10–25)
ANION GAP BLDMV CALCULATED.4IONS-SCNC: 13 MMO/L (ref 10–25)
ANION GAP BLDV CALCULATED.4IONS-SCNC: 12 MMOL/L (ref 10–25)
ANION GAP SERPL CALC-SCNC: 12 MMOL/L
ANION GAP SERPL CALC-SCNC: 17 MMOL/L (ref 10–20)
ANION GAP SERPL CALC-SCNC: 17 MMOL/L (ref 10–20)
ANTIBODY SCREEN: NORMAL
APTT PPP: 34 SECONDS (ref 27–38)
APTT PPP: 46.4 SECONDS (ref 22–32.5)
APTT PPP: 50 SECONDS (ref 22–32.5)
APTT PPP: >200 SECONDS (ref 27–38)
AST SERPL W P-5'-P-CCNC: 102 U/L (ref 9–39)
AST SERPL W P-5'-P-CCNC: 106 U/L (ref 9–39)
AST SERPL-CCNC: 131 U/L (ref 5–40)
BASE EXCESS BLDA CALC-SCNC: -1.1 MMOL/L (ref -2–3)
BASE EXCESS BLDMV CALC-SCNC: -0.1 MMOL/L (ref -2–3)
BASE EXCESS BLDMV CALC-SCNC: 0 MMOL/L (ref -2–3)
BASE EXCESS BLDMV CALC-SCNC: 0.3 MMOL/L (ref -2–3)
BASE EXCESS BLDMV CALC-SCNC: 0.6 MMOL/L (ref -2–3)
BASE EXCESS BLDV CALC-SCNC: 0.8 MMOL/L (ref -2–3)
BASOPHILS # BLD AUTO: 0.02 X10*3/UL (ref 0–0.1)
BASOPHILS # BLD AUTO: 0.02 X10*3/UL (ref 0–0.1)
BASOPHILS NFR BLD AUTO: 0.1 %
BASOPHILS NFR BLD AUTO: 0.1 %
BILIRUB SERPL-MCNC: 0.9 MG/DL (ref 0.1–1.2)
BILIRUB SERPL-MCNC: 1 MG/DL (ref 0–1.2)
BILIRUB SERPL-MCNC: 1.1 MG/DL (ref 0–1.2)
BNP SERPL-MCNC: 1905 PG/ML (ref 0–99)
BODY TEMPERATURE: 37 DEGREES CELSIUS
BUN SERPL-MCNC: 52 MG/DL (ref 8–25)
BUN SERPL-MCNC: 57 MG/DL (ref 6–23)
BUN SERPL-MCNC: 61 MG/DL (ref 6–23)
CA-I BLDA-SCNC: 1.08 MMOL/L (ref 1.1–1.33)
CA-I BLDMV-SCNC: 1.05 MMOL/L (ref 1.1–1.33)
CA-I BLDMV-SCNC: 1.08 MMOL/L (ref 1.1–1.33)
CA-I BLDMV-SCNC: 1.09 MMOL/L (ref 1.1–1.33)
CA-I BLDMV-SCNC: 1.1 MMOL/L (ref 1.1–1.33)
CA-I BLDV-SCNC: 1.07 MMOL/L (ref 1.1–1.33)
CALCIUM SERPL-MCNC: 8.2 MG/DL (ref 8.6–10.6)
CALCIUM SERPL-MCNC: 8.4 MG/DL (ref 8.6–10.6)
CALCIUM SERPL-MCNC: 8.8 MG/DL (ref 8.5–10.4)
CARDIAC TROPONIN I PNL SERPL HS: 3463 NG/L (ref 0–53)
CHLORIDE BLD-SCNC: 95 MMOL/L (ref 98–107)
CHLORIDE BLD-SCNC: 97 MMOL/L (ref 98–107)
CHLORIDE BLDA-SCNC: 98 MMOL/L (ref 98–107)
CHLORIDE BLDV-SCNC: 96 MMOL/L (ref 98–107)
CHLORIDE SERPL-SCNC: 95 MMOL/L (ref 98–107)
CHLORIDE SERPL-SCNC: 96 MMOL/L (ref 97–107)
CHLORIDE SERPL-SCNC: 97 MMOL/L (ref 98–107)
CO2 SERPL-SCNC: 25 MMOL/L (ref 21–32)
CO2 SERPL-SCNC: 25 MMOL/L (ref 21–32)
CO2 SERPL-SCNC: 25 MMOL/L (ref 24–31)
CREAT SERPL-MCNC: 2.2 MG/DL (ref 0.4–1.6)
CREAT SERPL-MCNC: 2.51 MG/DL (ref 0.5–1.3)
CREAT SERPL-MCNC: 2.84 MG/DL (ref 0.5–1.3)
EGFRCR SERPLBLD CKD-EPI 2021: 22 ML/MIN/1.73M*2
EGFRCR SERPLBLD CKD-EPI 2021: 26 ML/MIN/1.73M*2
EGFRCR SERPLBLD CKD-EPI 2021: 30 ML/MIN/1.73M*2
EOSINOPHIL # BLD AUTO: 0 X10*3/UL (ref 0–0.4)
EOSINOPHIL # BLD AUTO: 0 X10*3/UL (ref 0–0.4)
EOSINOPHIL NFR BLD AUTO: 0 %
EOSINOPHIL NFR BLD AUTO: 0 %
ERYTHROCYTE [DISTWIDTH] IN BLOOD BY AUTOMATED COUNT: 14.5 % (ref 11.5–14.5)
ERYTHROCYTE [DISTWIDTH] IN BLOOD BY AUTOMATED COUNT: 14.7 % (ref 11.5–14.5)
EST. AVERAGE GLUCOSE BLD GHB EST-MCNC: 108 MG/DL
GLUCOSE BLD MANUAL STRIP-MCNC: 159 MG/DL (ref 74–99)
GLUCOSE BLD MANUAL STRIP-MCNC: 288 MG/DL (ref 74–99)
GLUCOSE BLD-MCNC: 179 MG/DL (ref 74–99)
GLUCOSE BLD-MCNC: 182 MG/DL (ref 74–99)
GLUCOSE BLD-MCNC: 188 MG/DL (ref 74–99)
GLUCOSE BLD-MCNC: 200 MG/DL (ref 74–99)
GLUCOSE BLDA-MCNC: 179 MG/DL (ref 74–99)
GLUCOSE BLDV-MCNC: 186 MG/DL (ref 74–99)
GLUCOSE SERPL-MCNC: 175 MG/DL (ref 74–99)
GLUCOSE SERPL-MCNC: 178 MG/DL (ref 65–99)
GLUCOSE SERPL-MCNC: 306 MG/DL (ref 74–99)
HBA1C MFR BLD: 5.4 %
HCO3 BLDA-SCNC: 24.3 MMOL/L (ref 22–26)
HCO3 BLDMV-SCNC: 25.4 MMOL/L (ref 22–26)
HCO3 BLDMV-SCNC: 26 MMOL/L (ref 22–26)
HCO3 BLDMV-SCNC: 26.4 MMOL/L (ref 22–26)
HCO3 BLDMV-SCNC: 26.6 MMOL/L (ref 22–26)
HCO3 BLDV-SCNC: 26.6 MMOL/L (ref 22–26)
HCT VFR BLD AUTO: 39.5 % (ref 41–52)
HCT VFR BLD AUTO: 42.3 % (ref 41–52)
HCT VFR BLD EST: 40 % (ref 41–52)
HCT VFR BLD EST: 40 % (ref 41–52)
HCT VFR BLD EST: 41 % (ref 41–52)
HCT VFR BLD EST: 41 % (ref 41–52)
HCT VFR BLD EST: 42 % (ref 41–52)
HCT VFR BLD EST: 42 % (ref 41–52)
HGB BLD-MCNC: 13.4 G/DL (ref 13.5–17.5)
HGB BLD-MCNC: 13.6 G/DL (ref 13.5–17.5)
HGB BLDA-MCNC: 14.1 G/DL (ref 13.5–17.5)
HGB BLDMV-MCNC: 13.3 G/DL (ref 13.5–17.5)
HGB BLDMV-MCNC: 13.5 G/DL (ref 13.5–17.5)
HGB BLDMV-MCNC: 13.7 G/DL (ref 13.5–17.5)
HGB BLDMV-MCNC: 14.1 G/DL (ref 13.5–17.5)
HGB BLDV-MCNC: 13.4 G/DL (ref 13.5–17.5)
IMM GRANULOCYTES # BLD AUTO: 0.1 X10*3/UL (ref 0–0.5)
IMM GRANULOCYTES # BLD AUTO: 0.12 X10*3/UL (ref 0–0.5)
IMM GRANULOCYTES NFR BLD AUTO: 0.6 % (ref 0–0.9)
IMM GRANULOCYTES NFR BLD AUTO: 0.7 % (ref 0–0.9)
INHALED O2 CONCENTRATION: 21 %
INHALED O2 CONCENTRATION: 21 %
INHALED O2 CONCENTRATION: 32 %
INHALED O2 CONCENTRATION: 33 %
INHALED O2 CONCENTRATION: 34 %
INHALED O2 CONCENTRATION: 35 %
INR PPP: 1 (ref 0.9–1.1)
INR PPP: 1.2 (ref 0.9–1.1)
LACTATE BLDA-SCNC: 2.7 MMOL/L (ref 0.4–2)
LACTATE BLDMV-SCNC: 2 MMOL/L (ref 0.4–2)
LACTATE BLDMV-SCNC: 2.4 MMOL/L (ref 0.4–2)
LACTATE BLDMV-SCNC: 2.4 MMOL/L (ref 0.4–2)
LACTATE BLDMV-SCNC: 2.8 MMOL/L (ref 0.4–2)
LACTATE BLDV-SCNC: 2 MMOL/L (ref 0.4–2)
LACTATE BLDV-SCNC: 2 MMOL/L (ref 0.4–2)
LACTATE SERPL-SCNC: 1.8 MMOL/L (ref 0.4–2)
LYMPHOCYTES # BLD AUTO: 0.22 X10*3/UL (ref 0.8–3)
LYMPHOCYTES # BLD AUTO: 0.24 X10*3/UL (ref 0.8–3)
LYMPHOCYTES NFR BLD AUTO: 1.3 %
LYMPHOCYTES NFR BLD AUTO: 1.4 %
MAGNESIUM SERPL-MCNC: 2.84 MG/DL (ref 1.6–2.4)
MAGNESIUM SERPL-MCNC: 2.86 MG/DL (ref 1.6–2.4)
MAGNESIUM SERPL-MCNC: 3.1 MG/DL (ref 1.6–3.1)
MCH RBC QN AUTO: 34.1 PG (ref 26–34)
MCH RBC QN AUTO: 34.7 PG (ref 26–34)
MCHC RBC AUTO-ENTMCNC: 32.2 G/DL (ref 32–36)
MCHC RBC AUTO-ENTMCNC: 33.9 G/DL (ref 32–36)
MCV RBC AUTO: 102 FL (ref 80–100)
MCV RBC AUTO: 106 FL (ref 80–100)
MONOCYTES # BLD AUTO: 0.68 X10*3/UL (ref 0.05–0.8)
MONOCYTES # BLD AUTO: 0.7 X10*3/UL (ref 0.05–0.8)
MONOCYTES NFR BLD AUTO: 4 %
MONOCYTES NFR BLD AUTO: 4.1 %
NEUTROPHILS # BLD AUTO: 15.4 X10*3/UL (ref 1.6–5.5)
NEUTROPHILS # BLD AUTO: 16.35 X10*3/UL (ref 1.6–5.5)
NEUTROPHILS NFR BLD AUTO: 93.8 %
NEUTROPHILS NFR BLD AUTO: 93.9 %
NRBC BLD-RTO: 0 /100 WBCS (ref 0–0)
NRBC BLD-RTO: 0 /100 WBCS (ref 0–0)
OXYHGB MFR BLDA: 97.5 % (ref 94–98)
OXYHGB MFR BLDMV: 51.5 % (ref 45–75)
OXYHGB MFR BLDMV: 55.6 % (ref 45–75)
OXYHGB MFR BLDMV: 68.8 % (ref 45–75)
OXYHGB MFR BLDMV: 69.7 % (ref 45–75)
OXYHGB MFR BLDV: 62.3 % (ref 45–75)
PCO2 BLDA: 42 MM HG (ref 38–42)
PCO2 BLDMV: 43 MM HG (ref 41–51)
PCO2 BLDMV: 45 MM HG (ref 41–51)
PCO2 BLDMV: 47 MM HG (ref 41–51)
PCO2 BLDMV: 49 MM HG (ref 41–51)
PCO2 BLDV: 46 MM HG (ref 41–51)
PH BLDA: 7.37 PH (ref 7.38–7.42)
PH BLDMV: 7.34 PH (ref 7.33–7.43)
PH BLDMV: 7.36 PH (ref 7.33–7.43)
PH BLDMV: 7.37 PH (ref 7.33–7.43)
PH BLDMV: 7.38 PH (ref 7.33–7.43)
PH BLDV: 7.37 PH (ref 7.33–7.43)
PLATELET # BLD AUTO: 220 X10*3/UL (ref 150–450)
PLATELET # BLD AUTO: 221 X10*3/UL (ref 150–450)
PO2 BLDA: 117 MM HG (ref 85–95)
PO2 BLDMV: 37 MM HG (ref 35–45)
PO2 BLDMV: 39 MM HG (ref 35–45)
PO2 BLDMV: 45 MM HG (ref 35–45)
PO2 BLDMV: 47 MM HG (ref 35–45)
PO2 BLDV: 42 MM HG (ref 35–45)
POTASSIUM BLDA-SCNC: 4.7 MMOL/L (ref 3.5–5.3)
POTASSIUM BLDMV-SCNC: 4.8 MMOL/L (ref 3.5–5.3)
POTASSIUM BLDMV-SCNC: 5 MMOL/L (ref 3.5–5.3)
POTASSIUM BLDMV-SCNC: 5.1 MMOL/L (ref 3.5–5.3)
POTASSIUM BLDMV-SCNC: 5.1 MMOL/L (ref 3.5–5.3)
POTASSIUM BLDV-SCNC: 5 MMOL/L (ref 3.5–5.3)
POTASSIUM SERPL-SCNC: 4.7 MMOL/L (ref 3.5–5.3)
POTASSIUM SERPL-SCNC: 4.9 MMOL/L (ref 3.4–5.1)
POTASSIUM SERPL-SCNC: 5.1 MMOL/L (ref 3.5–5.3)
PROT SERPL-MCNC: 5.3 G/DL (ref 6.4–8.2)
PROT SERPL-MCNC: 5.3 G/DL (ref 6.4–8.2)
PROT SERPL-MCNC: 6 G/DL (ref 5.9–7.9)
PROTHROMBIN TIME: 11.6 SECONDS (ref 9.8–12.8)
PROTHROMBIN TIME: 13.5 SECONDS (ref 9.8–12.8)
RBC # BLD AUTO: 3.86 X10*6/UL (ref 4.5–5.9)
RBC # BLD AUTO: 3.99 X10*6/UL (ref 4.5–5.9)
RH FACTOR (ANTIGEN D): NORMAL
SAO2 % BLDA: 99 % (ref 94–100)
SAO2 % BLDMV: 52 % (ref 45–75)
SAO2 % BLDMV: 57 % (ref 45–75)
SAO2 % BLDMV: 71 % (ref 45–75)
SAO2 % BLDMV: 72 % (ref 45–75)
SAO2 % BLDV: 64 % (ref 45–75)
SODIUM BLDA-SCNC: 132 MMOL/L (ref 136–145)
SODIUM BLDMV-SCNC: 129 MMOL/L (ref 136–145)
SODIUM BLDMV-SCNC: 129 MMOL/L (ref 136–145)
SODIUM BLDMV-SCNC: 131 MMOL/L (ref 136–145)
SODIUM BLDMV-SCNC: 131 MMOL/L (ref 136–145)
SODIUM BLDV-SCNC: 130 MMOL/L (ref 136–145)
SODIUM SERPL-SCNC: 132 MMOL/L (ref 136–145)
SODIUM SERPL-SCNC: 133 MMOL/L (ref 133–145)
SODIUM SERPL-SCNC: 134 MMOL/L (ref 136–145)
UFH PPP CHRO-ACNC: 0.2 IU/ML
UFH PPP CHRO-ACNC: 0.6 IU/ML
UFH PPP CHRO-ACNC: 2 IU/ML
WBC # BLD AUTO: 16.4 X10*3/UL (ref 4.4–11.3)
WBC # BLD AUTO: 17.4 X10*3/UL (ref 4.4–11.3)

## 2024-02-19 PROCEDURE — 85347 COAGULATION TIME ACTIVATED: CPT

## 2024-02-19 PROCEDURE — 84132 ASSAY OF SERUM POTASSIUM: CPT

## 2024-02-19 PROCEDURE — 83735 ASSAY OF MAGNESIUM: CPT

## 2024-02-19 PROCEDURE — 99291 CRITICAL CARE FIRST HOUR: CPT | Performed by: STUDENT IN AN ORGANIZED HEALTH CARE EDUCATION/TRAINING PROGRAM

## 2024-02-19 PROCEDURE — 37799 UNLISTED PX VASCULAR SURGERY: CPT | Performed by: INTERNAL MEDICINE

## 2024-02-19 PROCEDURE — 37799 UNLISTED PX VASCULAR SURGERY: CPT

## 2024-02-19 PROCEDURE — B2111ZZ FLUOROSCOPY OF MULTIPLE CORONARY ARTERIES USING LOW OSMOLAR CONTRAST: ICD-10-PCS | Performed by: INTERNAL MEDICINE

## 2024-02-19 PROCEDURE — 85610 PROTHROMBIN TIME: CPT

## 2024-02-19 PROCEDURE — 36415 COLL VENOUS BLD VENIPUNCTURE: CPT

## 2024-02-19 PROCEDURE — 2500000004 HC RX 250 GENERAL PHARMACY W/ HCPCS (ALT 636 FOR OP/ED): Performed by: INTERNAL MEDICINE

## 2024-02-19 PROCEDURE — 76937 US GUIDE VASCULAR ACCESS: CPT | Performed by: INTERNAL MEDICINE

## 2024-02-19 PROCEDURE — 85025 COMPLETE CBC W/AUTO DIFF WBC: CPT

## 2024-02-19 PROCEDURE — 2550000001 HC RX 255 CONTRASTS: Performed by: INTERNAL MEDICINE

## 2024-02-19 PROCEDURE — 2780000003 HC OR 278 NO HCPCS: Performed by: INTERNAL MEDICINE

## 2024-02-19 PROCEDURE — 99233 SBSQ HOSP IP/OBS HIGH 50: CPT | Performed by: INTERNAL MEDICINE

## 2024-02-19 PROCEDURE — 93005 ELECTROCARDIOGRAM TRACING: CPT

## 2024-02-19 PROCEDURE — 02HV33Z INSERTION OF INFUSION DEVICE INTO SUPERIOR VENA CAVA, PERCUTANEOUS APPROACH: ICD-10-PCS | Performed by: INTERNAL MEDICINE

## 2024-02-19 PROCEDURE — 83605 ASSAY OF LACTIC ACID: CPT | Mod: MUE

## 2024-02-19 PROCEDURE — 85520 HEPARIN ASSAY: CPT

## 2024-02-19 PROCEDURE — 82805 BLOOD GASES W/O2 SATURATION: CPT | Mod: MUE

## 2024-02-19 PROCEDURE — 02HQ32Z INSERTION OF MONITORING DEVICE INTO RIGHT PULMONARY ARTERY, PERCUTANEOUS APPROACH: ICD-10-PCS | Performed by: INTERNAL MEDICINE

## 2024-02-19 PROCEDURE — 3E033XZ INTRODUCTION OF VASOPRESSOR INTO PERIPHERAL VEIN, PERCUTANEOUS APPROACH: ICD-10-PCS | Performed by: INTERNAL MEDICINE

## 2024-02-19 PROCEDURE — 2500000004 HC RX 250 GENERAL PHARMACY W/ HCPCS (ALT 636 FOR OP/ED)

## 2024-02-19 PROCEDURE — 2720000007 HC OR 272 NO HCPCS: Performed by: INTERNAL MEDICINE

## 2024-02-19 PROCEDURE — 5A02210 ASSISTANCE WITH CARDIAC OUTPUT USING BALLOON PUMP, CONTINUOUS: ICD-10-PCS | Performed by: INTERNAL MEDICINE

## 2024-02-19 PROCEDURE — C1894 INTRO/SHEATH, NON-LASER: HCPCS | Performed by: INTERNAL MEDICINE

## 2024-02-19 PROCEDURE — 71045 X-RAY EXAM CHEST 1 VIEW: CPT

## 2024-02-19 PROCEDURE — 33967 INSERT I-AORT PERCUT DEVICE: CPT | Performed by: INTERNAL MEDICINE

## 2024-02-19 PROCEDURE — 2500000001 HC RX 250 WO HCPCS SELF ADMINISTERED DRUGS (ALT 637 FOR MEDICARE OP)

## 2024-02-19 PROCEDURE — 2500000005 HC RX 250 GENERAL PHARMACY W/O HCPCS

## 2024-02-19 PROCEDURE — 1100000001 HC PRIVATE ROOM DAILY

## 2024-02-19 PROCEDURE — 87040 BLOOD CULTURE FOR BACTERIA: CPT

## 2024-02-19 PROCEDURE — 85520 HEPARIN ASSAY: CPT | Performed by: STUDENT IN AN ORGANIZED HEALTH CARE EDUCATION/TRAINING PROGRAM

## 2024-02-19 PROCEDURE — 85347 COAGULATION TIME ACTIVATED: CPT | Performed by: INTERNAL MEDICINE

## 2024-02-19 PROCEDURE — C1751 CATH, INF, PER/CENT/MIDLINE: HCPCS | Performed by: INTERNAL MEDICINE

## 2024-02-19 PROCEDURE — 86901 BLOOD TYPING SEROLOGIC RH(D): CPT

## 2024-02-19 PROCEDURE — 4A023N8 MEASUREMENT OF CARDIAC SAMPLING AND PRESSURE, BILATERAL, PERCUTANEOUS APPROACH: ICD-10-PCS | Performed by: INTERNAL MEDICINE

## 2024-02-19 PROCEDURE — 83036 HEMOGLOBIN GLYCOSYLATED A1C: CPT

## 2024-02-19 PROCEDURE — 93503 INSERT/PLACE HEART CATHETER: CPT | Performed by: INTERNAL MEDICINE

## 2024-02-19 PROCEDURE — 85730 THROMBOPLASTIN TIME PARTIAL: CPT | Performed by: INTERNAL MEDICINE

## 2024-02-19 PROCEDURE — 83880 ASSAY OF NATRIURETIC PEPTIDE: CPT

## 2024-02-19 PROCEDURE — 027035Z DILATION OF CORONARY ARTERY, ONE ARTERY WITH TWO DRUG-ELUTING INTRALUMINAL DEVICES, PERCUTANEOUS APPROACH: ICD-10-PCS | Performed by: INTERNAL MEDICINE

## 2024-02-19 PROCEDURE — 71045 X-RAY EXAM CHEST 1 VIEW: CPT | Performed by: RADIOLOGY

## 2024-02-19 PROCEDURE — 2500000005 HC RX 250 GENERAL PHARMACY W/O HCPCS: Performed by: INTERNAL MEDICINE

## 2024-02-19 PROCEDURE — 80053 COMPREHEN METABOLIC PANEL: CPT

## 2024-02-19 PROCEDURE — 84132 ASSAY OF SERUM POTASSIUM: CPT | Performed by: STUDENT IN AN ORGANIZED HEALTH CARE EDUCATION/TRAINING PROGRAM

## 2024-02-19 PROCEDURE — 93460 R&L HRT ART/VENTRICLE ANGIO: CPT | Performed by: INTERNAL MEDICINE

## 2024-02-19 PROCEDURE — 2500000004 HC RX 250 GENERAL PHARMACY W/ HCPCS (ALT 636 FOR OP/ED): Performed by: STUDENT IN AN ORGANIZED HEALTH CARE EDUCATION/TRAINING PROGRAM

## 2024-02-19 PROCEDURE — 84484 ASSAY OF TROPONIN QUANT: CPT

## 2024-02-19 PROCEDURE — C9113 INJ PANTOPRAZOLE SODIUM, VIA: HCPCS

## 2024-02-19 PROCEDURE — 82947 ASSAY GLUCOSE BLOOD QUANT: CPT

## 2024-02-19 RX ORDER — PANTOPRAZOLE SODIUM 40 MG/1
40 TABLET, DELAYED RELEASE ORAL
Status: DISCONTINUED | OUTPATIENT
Start: 2024-02-20 | End: 2024-02-24

## 2024-02-19 RX ORDER — POLYETHYLENE GLYCOL 3350 17 G/17G
17 POWDER, FOR SOLUTION ORAL DAILY
Status: DISCONTINUED | OUTPATIENT
Start: 2024-02-19 | End: 2024-02-24 | Stop reason: SDUPTHER

## 2024-02-19 RX ORDER — PHENYLEPHRINE 10 MG/250 ML(40 MCG/ML)IN 0.9 % SOD.CHLORIDE INTRAVENOUS
0-9 CONTINUOUS
Status: DISCONTINUED | OUTPATIENT
Start: 2024-02-19 | End: 2024-02-20

## 2024-02-19 RX ORDER — FUROSEMIDE 10 MG/ML
INJECTION INTRAMUSCULAR; INTRAVENOUS AS NEEDED
Status: DISCONTINUED | OUTPATIENT
Start: 2024-02-19 | End: 2024-02-19 | Stop reason: HOSPADM

## 2024-02-19 RX ORDER — DEXTROSE MONOHYDRATE 100 MG/ML
0.3 INJECTION, SOLUTION INTRAVENOUS ONCE AS NEEDED
Status: DISCONTINUED | OUTPATIENT
Start: 2024-02-19 | End: 2024-02-26 | Stop reason: HOSPADM

## 2024-02-19 RX ORDER — IODIXANOL 320 MG/ML
INJECTION, SOLUTION INTRAVASCULAR AS NEEDED
Status: DISCONTINUED | OUTPATIENT
Start: 2024-02-19 | End: 2024-02-19 | Stop reason: HOSPADM

## 2024-02-19 RX ORDER — DOBUTAMINE HYDROCHLORIDE 400 MG/100ML
2.5-2 INJECTION INTRAVENOUS CONTINUOUS
Status: DISCONTINUED | OUTPATIENT
Start: 2024-02-19 | End: 2024-02-19

## 2024-02-19 RX ORDER — LIDOCAINE HYDROCHLORIDE 20 MG/ML
INJECTION, SOLUTION INFILTRATION; PERINEURAL AS NEEDED
Status: DISCONTINUED | OUTPATIENT
Start: 2024-02-19 | End: 2024-02-19 | Stop reason: HOSPADM

## 2024-02-19 RX ORDER — LIDOCAINE HYDROCHLORIDE 10 MG/ML
INJECTION, SOLUTION EPIDURAL; INFILTRATION; INTRACAUDAL; PERINEURAL AS NEEDED
Status: DISCONTINUED | OUTPATIENT
Start: 2024-02-19 | End: 2024-02-19 | Stop reason: HOSPADM

## 2024-02-19 RX ORDER — HEPARIN SODIUM 5000 [USP'U]/ML
3000-6000 INJECTION, SOLUTION INTRAVENOUS; SUBCUTANEOUS EVERY 4 HOURS PRN
Status: DISCONTINUED | OUTPATIENT
Start: 2024-02-19 | End: 2024-02-26

## 2024-02-19 RX ORDER — FENTANYL CITRATE 50 UG/ML
12.5 INJECTION, SOLUTION INTRAMUSCULAR; INTRAVENOUS ONCE
Status: COMPLETED | OUTPATIENT
Start: 2024-02-19 | End: 2024-02-19

## 2024-02-19 RX ORDER — HEPARIN SODIUM 1000 [USP'U]/ML
INJECTION, SOLUTION INTRAVENOUS; SUBCUTANEOUS AS NEEDED
Status: DISCONTINUED | OUTPATIENT
Start: 2024-02-19 | End: 2024-02-19 | Stop reason: HOSPADM

## 2024-02-19 RX ORDER — AMOXICILLIN 250 MG
2 CAPSULE ORAL 2 TIMES DAILY
Status: DISCONTINUED | OUTPATIENT
Start: 2024-02-19 | End: 2024-02-26 | Stop reason: HOSPADM

## 2024-02-19 RX ORDER — INSULIN LISPRO 100 [IU]/ML
0-10 INJECTION, SOLUTION INTRAVENOUS; SUBCUTANEOUS
Status: DISCONTINUED | OUTPATIENT
Start: 2024-02-19 | End: 2024-02-21

## 2024-02-19 RX ORDER — ROSUVASTATIN CALCIUM 40 MG/1
40 TABLET, COATED ORAL NIGHTLY
Status: DISCONTINUED | OUTPATIENT
Start: 2024-02-19 | End: 2024-02-19

## 2024-02-19 RX ORDER — NOREPINEPHRINE BITARTRATE/D5W 8 MG/250ML
.01-1 PLASTIC BAG, INJECTION (ML) INTRAVENOUS CONTINUOUS
Status: DISCONTINUED | OUTPATIENT
Start: 2024-02-19 | End: 2024-02-19

## 2024-02-19 RX ORDER — TAMSULOSIN HYDROCHLORIDE 0.4 MG/1
0.4 CAPSULE ORAL DAILY
Status: DISCONTINUED | OUTPATIENT
Start: 2024-02-19 | End: 2024-02-19

## 2024-02-19 RX ORDER — HEPARIN SODIUM 10000 [USP'U]/100ML
0-4500 INJECTION, SOLUTION INTRAVENOUS CONTINUOUS
Status: DISCONTINUED | OUTPATIENT
Start: 2024-02-19 | End: 2024-02-26 | Stop reason: HOSPADM

## 2024-02-19 RX ORDER — FENTANYL CITRATE-0.9 % NACL/PF 10 MCG/ML
PREFILLED PUMP RESERVOIR INJECTION AS NEEDED
Status: DISCONTINUED | OUTPATIENT
Start: 2024-02-19 | End: 2024-02-19 | Stop reason: HOSPADM

## 2024-02-19 RX ORDER — ASPIRIN 81 MG/1
81 TABLET ORAL DAILY
Status: DISCONTINUED | OUTPATIENT
Start: 2024-02-19 | End: 2024-02-26 | Stop reason: HOSPADM

## 2024-02-19 RX ORDER — NOREPINEPHRINE BITARTRATE/D5W 8 MG/250ML
.01-1 PLASTIC BAG, INJECTION (ML) INTRAVENOUS CONTINUOUS
Status: DISCONTINUED | OUTPATIENT
Start: 2024-02-19 | End: 2024-02-20

## 2024-02-19 RX ORDER — MIRABEGRON 25 MG/1
25 TABLET, FILM COATED, EXTENDED RELEASE ORAL DAILY
Status: DISCONTINUED | OUTPATIENT
Start: 2024-02-19 | End: 2024-02-19

## 2024-02-19 RX ORDER — CLOPIDOGREL BISULFATE 75 MG/1
75 TABLET ORAL DAILY
Status: DISCONTINUED | OUTPATIENT
Start: 2024-02-19 | End: 2024-02-26 | Stop reason: HOSPADM

## 2024-02-19 RX ORDER — MILRINONE LACTATE 0.2 MG/ML
.1-.75 INJECTION, SOLUTION INTRAVENOUS CONTINUOUS
Status: DISCONTINUED | OUTPATIENT
Start: 2024-02-19 | End: 2024-02-20

## 2024-02-19 RX ORDER — DEXTROSE 50 % IN WATER (D50W) INTRAVENOUS SYRINGE
25
Status: DISCONTINUED | OUTPATIENT
Start: 2024-02-19 | End: 2024-02-26 | Stop reason: HOSPADM

## 2024-02-19 RX ORDER — FENTANYL CITRATE 50 UG/ML
INJECTION, SOLUTION INTRAMUSCULAR; INTRAVENOUS
Status: DISPENSED
Start: 2024-02-19 | End: 2024-02-20

## 2024-02-19 RX ADMIN — POLYETHYLENE GLYCOL 3350 17 G: 17 POWDER, FOR SOLUTION ORAL at 16:36

## 2024-02-19 RX ADMIN — METHYLPREDNISOLONE SODIUM SUCCINATE 40 MG: 40 INJECTION, POWDER, FOR SOLUTION INTRAMUSCULAR; INTRAVENOUS at 06:00

## 2024-02-19 RX ADMIN — DOBUTAMINE IN DEXTROSE 7.5 MCG/KG/MIN: 400 INJECTION, SOLUTION INTRAVENOUS at 08:56

## 2024-02-19 RX ADMIN — Medication 0.12 MCG/KG/MIN: at 08:53

## 2024-02-19 RX ADMIN — ASPIRIN 81 MG: 81 TABLET, COATED ORAL at 16:35

## 2024-02-19 RX ADMIN — VASOPRESSIN 0.03 UNITS/MIN: 0.2 INJECTION INTRAVENOUS at 21:49

## 2024-02-19 RX ADMIN — DOBUTAMINE HYDROCHLORIDE 7.5 MCG/KG/MIN: 400 INJECTION INTRAVENOUS at 13:08

## 2024-02-19 RX ADMIN — HEPARIN SODIUM AND DEXTROSE 2000 UNITS/HR: 10000; 5 INJECTION INTRAVENOUS at 13:12

## 2024-02-19 RX ADMIN — Medication 0.5 MCG/KG/MIN: at 23:32

## 2024-02-19 RX ADMIN — HEPARIN SODIUM AND DEXTROSE 2000 UNITS/HR: 10000; 5 INJECTION INTRAVENOUS at 20:27

## 2024-02-19 RX ADMIN — PIPERACILLIN SODIUM AND TAZOBACTAM SODIUM 3.38 G: 3; .375 INJECTION, SOLUTION INTRAVENOUS at 02:07

## 2024-02-19 RX ADMIN — CLOPIDOGREL BISULFATE 75 MG: 75 TABLET ORAL at 16:35

## 2024-02-19 RX ADMIN — PANTOPRAZOLE SODIUM 40 MG: 40 INJECTION, POWDER, FOR SOLUTION INTRAVENOUS at 06:00

## 2024-02-19 RX ADMIN — Medication 0.5 MCG/KG/MIN: at 17:45

## 2024-02-19 RX ADMIN — FENTANYL CITRATE 12.5 MCG: 50 INJECTION INTRAMUSCULAR; INTRAVENOUS at 21:59

## 2024-02-19 RX ADMIN — MILRINONE LACTATE IN DEXTROSE 0.25 MCG/KG/MIN: 200 INJECTION, SOLUTION INTRAVENOUS at 19:45

## 2024-02-19 RX ADMIN — VASOPRESSIN 0.03 UNITS/MIN: 0.2 INJECTION INTRAVENOUS at 16:28

## 2024-02-19 RX ADMIN — NOREPINEPHRINE BITARTRATE 0.17 MCG/KG/MIN: 8 INJECTION, SOLUTION INTRAVENOUS at 13:08

## 2024-02-19 RX ADMIN — Medication 0.3 MCG/KG/MIN: at 19:45

## 2024-02-19 RX ADMIN — Medication 0.3 MCG/KG/MIN: at 20:25

## 2024-02-19 RX ADMIN — NOREPINEPHRINE BITARTRATE 0.2 MCG/KG/MIN: 8 INJECTION, SOLUTION INTRAVENOUS at 16:35

## 2024-02-19 RX ADMIN — DOBUTAMINE IN DEXTROSE 5 MCG/KG/MIN: 400 INJECTION, SOLUTION INTRAVENOUS at 02:07

## 2024-02-19 RX ADMIN — SODIUM CHLORIDE, POTASSIUM CHLORIDE, SODIUM LACTATE AND CALCIUM CHLORIDE 500 ML: 600; 310; 30; 20 INJECTION, SOLUTION INTRAVENOUS at 16:36

## 2024-02-19 RX ADMIN — SENNOSIDES AND DOCUSATE SODIUM 2 TABLET: 8.6; 5 TABLET ORAL at 16:39

## 2024-02-19 RX ADMIN — FUROSEMIDE 40 MG: 10 INJECTION, SOLUTION INTRAMUSCULAR; INTRAVENOUS at 03:49

## 2024-02-19 SDOH — SOCIAL STABILITY: SOCIAL INSECURITY: DOES ANYONE TRY TO KEEP YOU FROM HAVING/CONTACTING OTHER FRIENDS OR DOING THINGS OUTSIDE YOUR HOME?: NO

## 2024-02-19 SDOH — SOCIAL STABILITY: SOCIAL INSECURITY: ARE THERE ANY APPARENT SIGNS OF INJURIES/BEHAVIORS THAT COULD BE RELATED TO ABUSE/NEGLECT?: NO

## 2024-02-19 SDOH — SOCIAL STABILITY: SOCIAL INSECURITY: HAS ANYONE EVER THREATENED TO HURT YOUR FAMILY OR YOUR PETS?: NO

## 2024-02-19 SDOH — SOCIAL STABILITY: SOCIAL INSECURITY: ARE YOU OR HAVE YOU BEEN THREATENED OR ABUSED PHYSICALLY, EMOTIONALLY, OR SEXUALLY BY ANYONE?: NO

## 2024-02-19 SDOH — SOCIAL STABILITY: SOCIAL INSECURITY: ABUSE: ADULT

## 2024-02-19 SDOH — SOCIAL STABILITY: SOCIAL INSECURITY: HAVE YOU HAD THOUGHTS OF HARMING ANYONE ELSE?: NO

## 2024-02-19 SDOH — SOCIAL STABILITY: SOCIAL INSECURITY: WERE YOU ABLE TO COMPLETE ALL THE BEHAVIORAL HEALTH SCREENINGS?: YES

## 2024-02-19 SDOH — SOCIAL STABILITY: SOCIAL INSECURITY: DO YOU FEEL ANYONE HAS EXPLOITED OR TAKEN ADVANTAGE OF YOU FINANCIALLY OR OF YOUR PERSONAL PROPERTY?: NO

## 2024-02-19 SDOH — SOCIAL STABILITY: SOCIAL INSECURITY: DO YOU FEEL UNSAFE GOING BACK TO THE PLACE WHERE YOU ARE LIVING?: NO

## 2024-02-19 ASSESSMENT — COGNITIVE AND FUNCTIONAL STATUS - GENERAL
MOBILITY SCORE: 18
MOVING FROM LYING ON BACK TO SITTING ON SIDE OF FLAT BED WITH BEDRAILS: A LITTLE
CLIMB 3 TO 5 STEPS WITH RAILING: A LITTLE
WALKING IN HOSPITAL ROOM: A LITTLE
MOVING TO AND FROM BED TO CHAIR: A LITTLE
PATIENT BASELINE BEDBOUND: NO
STANDING UP FROM CHAIR USING ARMS: A LITTLE
DAILY ACTIVITIY SCORE: 24
TURNING FROM BACK TO SIDE WHILE IN FLAT BAD: A LITTLE

## 2024-02-19 ASSESSMENT — PAIN SCALES - GENERAL
PAINLEVEL_OUTOF10: 0 - NO PAIN

## 2024-02-19 ASSESSMENT — ACTIVITIES OF DAILY LIVING (ADL)
WALKS IN HOME: INDEPENDENT
HEARING - RIGHT EAR: HEARING AID
GROOMING: INDEPENDENT
JUDGMENT_ADEQUATE_SAFELY_COMPLETE_DAILY_ACTIVITIES: YES
ADEQUATE_TO_COMPLETE_ADL: YES
TOILETING: INDEPENDENT
HEARING - LEFT EAR: HEARING AID
PATIENT'S MEMORY ADEQUATE TO SAFELY COMPLETE DAILY ACTIVITIES?: YES
DRESSING YOURSELF: INDEPENDENT
FEEDING YOURSELF: INDEPENDENT
BATHING: INDEPENDENT
LACK_OF_TRANSPORTATION: NO
ASSISTIVE_DEVICE: HEARING AID - RIGHT;HEARING AID - LEFT

## 2024-02-19 ASSESSMENT — PAIN - FUNCTIONAL ASSESSMENT
PAIN_FUNCTIONAL_ASSESSMENT: 0-10

## 2024-02-19 ASSESSMENT — LIFESTYLE VARIABLES
HOW MANY STANDARD DRINKS CONTAINING ALCOHOL DO YOU HAVE ON A TYPICAL DAY: 1 OR 2
AUDIT-C TOTAL SCORE: 4
HOW OFTEN DO YOU HAVE A DRINK CONTAINING ALCOHOL: 4 OR MORE TIMES A WEEK
PRESCIPTION_ABUSE_PAST_12_MONTHS: NO
SUBSTANCE_ABUSE_PAST_12_MONTHS: NO
HOW OFTEN DO YOU HAVE 6 OR MORE DRINKS ON ONE OCCASION: NEVER
SKIP TO QUESTIONS 9-10: 1
AUDIT-C TOTAL SCORE: 4

## 2024-02-19 ASSESSMENT — COLUMBIA-SUICIDE SEVERITY RATING SCALE - C-SSRS
2. HAVE YOU ACTUALLY HAD ANY THOUGHTS OF KILLING YOURSELF?: NO
1. IN THE PAST MONTH, HAVE YOU WISHED YOU WERE DEAD OR WISHED YOU COULD GO TO SLEEP AND NOT WAKE UP?: NO
6. HAVE YOU EVER DONE ANYTHING, STARTED TO DO ANYTHING, OR PREPARED TO DO ANYTHING TO END YOUR LIFE?: NO

## 2024-02-19 NOTE — NURSING NOTE
Report called to Vincent at [facility].  Patient is going right from cath lab to main campus.

## 2024-02-19 NOTE — Clinical Note
Angioplasty of the proximal left anterior descending lesion. Inflation 1: Pressure = 16 ann; Duration = 10 sec. Inflation 2: Pressure = 22 ann; Duration = 10 sec. Post dilate

## 2024-02-19 NOTE — DISCHARGE SUMMARY
Discharge Diagnosis  Chronic systolic (congestive) heart failure (CMS/[facility])    Issues Requiring Follow-Up    The patient will be discharged to a tertiary care center to resume care    Test Results Pending At Discharge  Pending Labs       Order Current Status    Troponin T Series, High Sensitivity (0, 2HR, 6HR) In process            Hospital Course   The patient was admitted with shortness of breath and hypotension he was found to be in cardiogenic shock he was started on Levophed and dobutamine.  At that time ceftriaxone was started for presumed UTI the patient during hospitalization continue to have positive troponin and heparin was started 2 days later.  Xarelto was held.  Acute kidney failure and shock liver improved over time but the patient remained hypotensive.  The patient was on oxygen at 2 to 3 L and he was using CPAP intermittently.  On Saturday night which is 17 February he had difficulty breathing and wheezing and cardiology started steroids for presumed COPD although chest x-ray showed increased pulmonary venous congestion.  There is questionable left midlung zone infiltrate so Zosyn was started  Today February 19 left heart cath was done showed severe vests triple-vessel disease and low ejection fraction hence transferred to tertiary center was started by cardiology    Pertinent Physical Exam At Time of Discharge  Physical Exam  On physical examination today the patient was on CPAP decreased elevated  Lung examination showed bibasilar crackles with expiratory wheezes  Heart sounds are distant could not hear murmurs  Abdomen is soft and nontender gram-negative could not be felt  Extremities has trace edema  Neurologically is intact alert and conscious x 3 without any focal deficit  Musculoskeletal there is no muscle deformity or weakness.  Home Medications     Medication List      ASK your doctor about these medications     clopidogrel 75 mg tablet; Commonly known as: Plavix   Entresto  mg tablet;  Generic drug: sacubitriL-valsartan   metoprolol succinate  mg 24 hr tablet; Commonly known as:   Toprol-XL; Take 1 tablet (100 mg) by mouth once daily. Do not crush or   chew.   metoprolol tartrate 50 mg tablet; Commonly known as: Lopressor; Take 1   tablet by mouth 3 times a day. For 90 days   Myrbetriq 25 mg tablet extended release 24 hr 24 hr tablet; Generic   drug: mirabegron   nitroglycerin 0.4 mg SL tablet; Commonly known as: Nitrostat   rosuvastatin 10 mg tablet; Commonly known as: Crestor; Take 1 tablet (10   mg) by mouth once daily.   tamsulosin 0.4 mg 24 hr capsule; Commonly known as: Flomax   Xarelto 20 mg tablet; Generic drug: rivaroxaban       Outpatient Follow-Up  Future Appointments   Date Time Provider Department [place]   3/19/2024  9:30 AM Delia Araujo MD [id number] [facility]   7/18/2024  9:15 AM Stan Platt MD [id number] None       Tarek R Gharibeh, MD

## 2024-02-19 NOTE — Clinical Note
Angioplasty of the left anterior descending lesion. Inflation 1: Pressure = 12 ann; Duration = 5 sec. Inflation 2: Pressure = 14 ann; Duration = 5 sec.

## 2024-02-19 NOTE — PROCEDURES
Arterial Line Placement    Hands were washed immediately prior to the  procedure. Surgical cap, mask, sterile gown, and sterile gloves throughout the procedure. After visualizing both patent arteries in wrist, the left wrist was prepped using chlorhexidine scrub and draped in sterile fashion. The radial artery was identified with ultrasound, and the wrist was positioned in the usual fashion. Anesthesia was achieved using 1% lidocaine. A needle was inserted into the radial artery. Arterial blood was seen to pulsate in the flash chamber. A guidewire was advanced easily into the radial artery. Needle was removed, and a catheter was then advanced over the wire and the needle and wire were withdrawn. The catheter was sutured in place. A sterile dressing was placed over the catheter at the  insertion site. The patient tolerated the procedure without any  hemodynamic compromise. At the time of procedure completion, the  catheter was connected to the cardiac monitor and calibrated. Appropriate waveform and blood pressure tracing was observed.     Nara Machado MD

## 2024-02-19 NOTE — Clinical Note
Angioplasty of the proximal circumflex lesion. Inflation 1: Pressure = 18 ann; Duration = 10 sec. Inflation 2: Pressure = 22 ann; Duration = 10 sec. Inflation 3: Pressure = 18 ann; Duration = 10 sec.

## 2024-02-19 NOTE — Clinical Note
Angioplasty of the ostium circumflex lesion. Inflation 1: Pressure = 20 ann; Duration = 10 sec. Inflation 2: Pressure = 22 ann; Duration = 10 sec.

## 2024-02-19 NOTE — Clinical Note
Vessel: LAD (proximal). Stent inserted. Inflation 1: Pressure = 14 ann; Duration = 15 sec. Inflation 2: Pressure = 16 ann; Duration = 5 sec.

## 2024-02-19 NOTE — Clinical Note
Angioplasty of the proximal left anterior descending lesion. Inflation 1: Pressure = 14 ann; Duration = 5 sec.

## 2024-02-19 NOTE — NURSING NOTE
Assumed care of patient.  He is alert and awake.  VSS.  NAD.  Cath lab team is here to take patient down to the cath lab.

## 2024-02-19 NOTE — H&P (VIEW-ONLY)
"Tesfaye Milton is a 78 y.o. male on day 6 of admission presenting with Chronic systolic (congestive) heart failure (CMS/HCC).    Subjective The patient was admitted 6 days ago for cardiogenic shock and non-ST elevation MI he was started dobutamine and Levophed.  Kidney function and liver enzymes improved but the patient blood pressure remained low requiring vasopressors he underwent left heart catheterization today on February 19 and found that he has severe multivessel disease and low ejection fraction hence the transfer to a tertiary center was initiated by the cardiologist.       Objective       Physical Exam  On physical examination today the patient was on CPAP decreased elevated  Lung examination showed bibasilar crackles with expiratory wheezes  Heart sounds are distant could not hear murmurs  Abdomen is soft and nontender gram-negative could not be felt  Extremities has trace edema  Neurologically is intact alert and conscious x 3 without any focal deficit  Musculoskeletal there is no muscle deformity or weakness.  Last Recorded Vitals  Blood pressure 90/59, pulse (!) 138, temperature 35.7 °C (96.3 °F), temperature source Temporal, resp. rate 14, height 1.905 m (6' 3\"), weight 114 kg (251 lb 8.7 oz), SpO2 100 %.  Intake/Output last 3 Shifts:  I/O last 3 completed shifts:  In: 3365.3 (29.5 mL/kg) [P.O.:440; I.V.:2775.3 (24.3 mL/kg); IV Piggyback:150]  Out: 2095 (18.4 mL/kg) [Urine:2095 (0.5 mL/kg/hr)]  Weight: 114.1 kg     Relevant Results    Scheduled medications  aspirin, 81 mg, oral, Daily  clopidogrel, 75 mg, oral, Daily  insulin lispro, 0-10 Units, subcutaneous, TID with meals  lidocaine, 5 mL, infiltration, Once  methylPREDNISolone sodium succinate (PF), 40 mg, intravenous, q8h  [Held by provider] metoprolol succinate XL, 100 mg, oral, Daily  [Held by provider] metoprolol tartrate, 50 mg, oral, TID  oxygen, , inhalation, Continuous - 02/gases  pantoprazole, 40 mg, oral, Daily before breakfast   " Or  pantoprazole, 40 mg, intravenous, Daily before breakfast  piperacillin-tazobactam, 3.375 g, intravenous, q6h  sennosides-docusate sodium, 2 tablet, oral, BID  [Held by provider] tamsulosin, 0.4 mg, oral, Daily      Continuous medications  DOBUTamine, 2.5-5 mcg/kg/min, Last Rate: 7.5 mcg/kg/min (02/19/24 0856)  heparin, 0-4,000 Units/hr, Last Rate: 1,300 Units/hr (02/19/24 0700)  norepinephrine, 0.01-0.5 mcg/kg/min, Last Rate: 0.12 mcg/kg/min (02/19/24 0853)  oxygen,       PRN medications  PRN medications: alteplase, dextrose 10 % in water (D10W), dextrose, glucagon, heparin (porcine)  Results for orders placed or performed during the hospital encounter of 02/13/24 (from the past 24 hour(s))   aPTT   Result Value Ref Range    aPTT 36.1 (H) 22.0 - 32.5 seconds   POCT GLUCOSE   Result Value Ref Range    POCT Glucose 151 (H) 74 - 99 mg/dL   POCT GLUCOSE   Result Value Ref Range    POCT Glucose 137 (H) 74 - 99 mg/dL   aPTT   Result Value Ref Range    aPTT 40.4 (H) 22.0 - 32.5 seconds   POCT GLUCOSE   Result Value Ref Range    POCT Glucose 155 (H) 74 - 99 mg/dL   aPTT   Result Value Ref Range    aPTT 46.4 (H) 22.0 - 32.5 seconds   CBC and Auto Differential   Result Value Ref Range    WBC 17.4 (H) 4.4 - 11.3 x10*3/uL    nRBC 0.0 0.0 - 0.0 /100 WBCs    RBC 3.99 (L) 4.50 - 5.90 x10*6/uL    Hemoglobin 13.6 13.5 - 17.5 g/dL    Hematocrit 42.3 41.0 - 52.0 %     (H) 80 - 100 fL    MCH 34.1 (H) 26.0 - 34.0 pg    MCHC 32.2 32.0 - 36.0 g/dL    RDW 14.7 (H) 11.5 - 14.5 %    Platelets 220 150 - 450 x10*3/uL    Neutrophils % 93.8 40.0 - 80.0 %    Immature Granulocytes %, Automated 0.7 0.0 - 0.9 %    Lymphocytes % 1.4 13.0 - 44.0 %    Monocytes % 4.0 2.0 - 10.0 %    Eosinophils % 0.0 0.0 - 6.0 %    Basophils % 0.1 0.0 - 2.0 %    Neutrophils Absolute 16.35 (H) 1.60 - 5.50 x10*3/uL    Immature Granulocytes Absolute, Automated 0.12 0.00 - 0.50 x10*3/uL    Lymphocytes Absolute 0.24 (L) 0.80 - 3.00 x10*3/uL    Monocytes Absolute  0.70 0.05 - 0.80 x10*3/uL    Eosinophils Absolute 0.00 0.00 - 0.40 x10*3/uL    Basophils Absolute 0.02 0.00 - 0.10 x10*3/uL   Comprehensive metabolic panel   Result Value Ref Range    Glucose 178 (H) 65 - 99 mg/dL    Sodium 133 133 - 145 mmol/L    Potassium 4.9 3.4 - 5.1 mmol/L    Chloride 96 (L) 97 - 107 mmol/L    Bicarbonate 25 24 - 31 mmol/L    Urea Nitrogen 52 (H) 8 - 25 mg/dL    Creatinine 2.20 (H) 0.40 - 1.60 mg/dL    eGFR 30 (L) >60 mL/min/1.73m*2    Calcium 8.8 8.5 - 10.4 mg/dL    Albumin 3.0 (L) 3.5 - 5.0 g/dL    Alkaline Phosphatase 92 35 - 125 U/L    Total Protein 6.0 5.9 - 7.9 g/dL     (H) 5 - 40 U/L    Bilirubin, Total 0.9 0.1 - 1.2 mg/dL     (H) 5 - 40 U/L    Anion Gap 12 <=19 mmol/L   Magnesium   Result Value Ref Range    Magnesium 3.10 1.60 - 3.10 mg/dL   aPTT   Result Value Ref Range    aPTT 50.0 (H) 22.0 - 32.5 seconds       XR chest 1 view    Result Date: 2/18/2024  STUDY: Chest Radiograph;  2/17/2024 11:04PM INDICATION: Acute respiratory problem. COMPARISON: XR chest 2/16/2024 ACCESSION NUMBER(S): [id number] ORDERING CLINICIAN: SHERINE YORK TECHNIQUE:  Frontal chest was obtained at 23:03 hours. FINDINGS: CARDIOMEDIASTINAL SILHOUETTE: Heart size is enlarged.  There is right-sided PICC line with tip located in the cavoatrial junction.  There is prominence of the aorta.  There is pulmonary vascular congestion. LUNGS: There are subtle patchy opacities greater within the left perihilar region without pneumothorax or pleural effusion.  ABDOMEN: No remarkable upper abdominal findings.  BONES: No acute osseous changes.    Cardiomegaly and pulmonary vascular congestion suggesting fluid overload.  Subtle airspace opacities greater in the left perihilar region.  Please correlate for pneumonia. Signed by Sonny Alfredo Hursh, DO                           Assessment/Plan   Principal Problem:    Chronic systolic (congestive) heart failure (CMS/HCC)  Active Problems:    Cardiogenic shock  (CMS/[facility])    NSTEMI (non-ST elevated myocardial infarction) (CMS/[facility])    Transfer to [facility] as the patient was accepted by the shock team     Tarek R Gharibeh, MD

## 2024-02-19 NOTE — Clinical Note
Angioplasty of the proximal circumflex lesion. Inflation 1: Pressure = 14 ann; Duration = 10 sec. Inflation 2: Pressure = 20 ann; Duration = 10 sec.

## 2024-02-19 NOTE — CARE PLAN
The patient's goals for the shift include  breath better    The clinical goals for the shift include remain hemodynamically stable and decrease shortness of breath      Problem: Heart Failure  Goal: Improved gas exchange this shift  Outcome: Progressing  Goal: Improved urinary output this shift  Outcome: Progressing  Goal: Reduction in peripheral edema within 24 hours  Outcome: Progressing  Goal: Report improvement of dyspnea/breathlessness this shift  Outcome: Progressing  Goal: Weight from fluid excess reduced over 2-3 days, then stabilize  Outcome: Progressing  Goal: Increase self care and/or family involvement in 24 hours  Outcome: Progressing     Problem: Skin  Goal: Decreased wound size/increased tissue granulation at next dressing change  Outcome: Progressing  Flowsheets (Taken 2/18/2024 2212)  Decreased wound size/increased tissue granulation at next dressing change:   Promote sleep for wound healing   Protective dressings over bony prominences   Utilize specialty bed per algorithm  Goal: Participates in plan/prevention/treatment measures  Outcome: Progressing  Flowsheets (Taken 2/18/2024 2212)  Participates in plan/prevention/treatment measures:   Elevate heels   Discuss with provider PT/OT consult  Goal: Prevent/manage excess moisture  Outcome: Progressing  Flowsheets (Taken 2/18/2024 2212)  Prevent/manage excess moisture:   Cleanse incontinence/protect with barrier cream   Follow provider orders for dressing changes   Moisturize dry skin   Monitor for/manage infection if present  Goal: Prevent/minimize sheer/friction injuries  Outcome: Progressing  Flowsheets (Taken 2/18/2024 2212)  Prevent/minimize sheer/friction injuries:   Complete micro-shifts as needed if patient unable. Adjust patient position to relieve pressure points, not a full turn   Use pull sheet   Turn/reposition every 2 hours/use positioning/transfer devices   Utilize specialty bed per algorithm  Goal: Promote/optimize nutrition  Outcome:  Progressing  Flowsheets (Taken 2/18/2024 2212)  Promote/optimize nutrition:   Consume > 50% meals/supplements   Offer water/supplements/favorite foods   Monitor/record intake including meals  Goal: Promote skin healing  Outcome: Progressing  Flowsheets (Taken 2/18/2024 2212)  Promote skin healing:   Assess skin/pad under line(s)/device(s)   Protective dressings over bony prominences   Turn/reposition every 2 hours/use positioning/transfer devices     Problem: Safety - Adult  Goal: Free from fall injury  Outcome: Progressing     Problem: Discharge Planning  Goal: Discharge to home or other facility with appropriate resources  Outcome: Progressing     Problem: Respiratory  Goal: Minimize anxiety/maximize coping throughout shift  Outcome: Progressing  Goal: Minimal/no exertional discomfort or dyspnea this shift  Outcome: Progressing  Goal: Tolerate mechanical ventilation evidenced by VS/agitation level this shift  Outcome: Progressing  Goal: Clear secretions with interventions this shift  Outcome: Progressing  Goal: Patent airway maintained this shift  Outcome: Progressing  Goal: Wean oxygen to maintain O2 saturation per order/standard this shift  Outcome: Progressing

## 2024-02-19 NOTE — PROGRESS NOTES
Subjective Data:  Patient is feeling much better he remains on BiPAP with an MI evaluation his wife is almost at the bedside.  He did had an episode of acute respite distress last night for which reason patient was started on BiPAP and patient did receive bronchodilators and steroids were initiated.  1 dose of Lasix 40 mL IV for which she diuresed 1200 cc of urine and felt much better.  He was off BiPAP this morning but resumed back on BiPAP at his own request this evening    Overnight Events:    As documented     Objective Data:  Last Recorded Vitals:  Vitals:    02/18/24 1900 02/18/24 1925 02/18/24 1945 02/18/24 2000   BP: 78/62 90/62  81/62   Pulse: (!) 124 (!) 127  (!) 134   Resp: 21 19 26   Temp:       TempSrc:       SpO2: 99% 98% 93% 94%   Weight:       Height:           Last Labs:  CBC - 2/18/2024:  4:40 AM  8.2 13.6 191    41.5      CMP - 2/18/2024:  4:40 AM  8.0 5.6 192 --- 0.9   _ 3.0 452 91      PTT - 2/18/2024:  6:02 PM  _   _ 40.4     HGBA1C   Date/Time Value Ref Range Status   07/09/2021 09:12 AM 5.2 % Final     Comment:          Diagnosis of Diabetes-Adults   Non-Diabetic: < or = 5.6%   Increased risk for developing diabetes: 5.7-6.4%   Diagnostic of diabetes: > or = 6.5%  .       Monitoring of Diabetes                Age (y)     Therapeutic Goal (%)   Adults:          >18           <7.0   Pediatrics:    13-18           <7.5                   7-12           <8.0                   0- 6            7.5-8.5   American Diabetes Association. Diabetes Care 33(S1), Jan 2010.       LDLCALC   Date/Time Value Ref Range Status   12/15/2023 10:28 AM 82 <=99 mg/dL Final     Comment:                                 Near   Borderline      AGE      Desirable  Optimal    High     High     Very High     0-19 Y     0 - 109     ---    110-129   >/= 130     ----    20-24 Y     0 - 119     ---    120-159   >/= 160     ----      >24 Y     0 -  99   100-129  130-159   160-189     >/=190     05/09/2020 04:24  65 - 130  MG/DL Final     VLDL   Date/Time Value Ref Range Status   12/15/2023 10:28 AM 17 0 - 40 mg/dL Final   09/20/2023 09:51 AM 14 0 - 40 mg/dL Final   06/30/2022 09:30 AM 19 0 - 40 mg/dL Final   01/18/2022 12:50 PM 21 0 - 40 mg/dL Final      Last I/O:  I/O last 3 completed shifts:  In: 890 (8 mL/kg) [P.O.:840; IV Piggyback:50]  Out: 2975 (26.6 mL/kg) [Urine:2975 (0.7 mL/kg/hr)]  Weight: 111.7 kg     Past Cardiology Tests (Last 3 Years):  EKG:  Electrocardiogram, 12-lead PRN ACS symptoms 02/13/2024 (Preliminary)      ECG 12 Lead     Echo:  Transthoracic Echo (TTE) Complete 02/13/2024    Ejection Fractions:  EF   Date/Time Value Ref Range Status   02/13/2024 02:34 PM 27 %      Cath:  No results found for this or any previous visit from the past 1095 days.    Stress Test:  No results found for this or any previous visit from the past 1095 days.    Cardiac Imaging:  No results found for this or any previous visit from the past 1095 days.      Inpatient Medications:  Scheduled medications   Medication Dose Route Frequency    aspirin  81 mg oral Daily    clopidogrel  75 mg oral Daily    [START ON 2/19/2024] furosemide  40 mg intravenous Once    insulin lispro  0-10 Units subcutaneous TID with meals    lidocaine  5 mL infiltration Once    methylPREDNISolone sodium succinate (PF)  40 mg intravenous q8h    [Held by provider] metoprolol succinate XL  100 mg oral Daily    [Held by provider] metoprolol tartrate  50 mg oral TID    oxygen   inhalation Continuous - 02/gases    pantoprazole  40 mg oral Daily before breakfast    Or    pantoprazole  40 mg intravenous Daily before breakfast    piperacillin-tazobactam  3.375 g intravenous q6h    sennosides-docusate sodium  2 tablet oral BID    [Held by provider] tamsulosin  0.4 mg oral Daily     PRN medications   Medication    alteplase    dextrose 10 % in water (D10W)    dextrose    glucagon    heparin (porcine)     Continuous Medications   Medication Dose Last Rate    DOBUTamine  2.5-5  mcg/kg/min 5 mcg/kg/min (02/18/24 2000)    heparin  0-4,000 Units/hr 1,300 Units/hr (02/18/24 2000)    norepinephrine  0.01-0.5 mcg/kg/min Stopped (02/18/24 0654)    oxygen           Physical Exam:  HEENT KATHI, neck is supple lungs bilateral expiratory wheezing appreciated.  Heart S1-S2 present with no murmurs abdomen soft nontender EXTR shows no evidence of pedal edema.  He was discontinued from IV Levophed last night and remains on IV dobutamine at 5 mics per kilogram per minute for inotropic effect.     Assessment/Plan   1.  Acute on chronic LV systolic dysfunction-proving  2.  NSTEMI-is on IV heparin drip  3.  Acute renal insufficiency resolved  4.  Cardiogenic shock improving  Give 1 dose of Lasix 40 mg at 4 AM to facilitate left heart catheterization so patient can able to lay flat on the cardiac cath lab table with a wedge which was scheduled to be done at 8 AM.  Informed consent in chart  PICC - Adult 02/16/24 Triple lumen Right Basilic vein (Active)   Line Necessity Intravenous medication therapy 02/18/24 2000   Site Assessment Clean;Dry;Intact 02/18/24 2000   Proximal Lumen Status Infusing 02/18/24 2000   Medial Lumen Status Infusing 02/18/24 2000   Distal Lumen Status Infusing 02/18/24 2000   Dressing Type Antimicrobial patch;Transparent 02/18/24 2000   Dressing Status Clean;Dry;Occlusive 02/18/24 2000   Number of days: 2       Peripheral IV 02/13/24 20 G Left Forearm (Active)   Site Assessment Clean;Dry;Intact 02/18/24 2000   Dressing Type Transparent 02/18/24 2000   Line Status No blood return;Flushed;Saline locked 02/18/24 2000   Dressing Status Clean;Dry;Occlusive 02/18/24 2000   Number of days: 5       Urethral Catheter 16 Fr. (Active)   Site Assessment Clean;Skin intact 02/18/24 2000   Collection Container Standard drainage bag 02/18/24 2000   Securement Method Securing device (Describe) 02/18/24 2000   Reason for Continuing Urinary Catheterization accurate hourly measurement of urine volume in a  critically ill patient that cannot be assessed by other volumes and urine collection strategies 02/18/24 2000   Output (mL) 50 mL 02/18/24 2000   Number of days: 5       Code Status:  Full Code    I spent 35 minutes in the professional and overall care of this patient.        Wayne Armijo MD

## 2024-02-19 NOTE — INTERVAL H&P NOTE
H&P reviewed. The patient was examined and there are no changes to the H&P.    Chart reviewed, patient seen upon transport to cath lab. Plan for IABP in lab this evening.

## 2024-02-19 NOTE — POST-PROCEDURE NOTE
Procedure:  IABP insertion    Indication:  Cardiogenic shock    Referring:  Dr. Pimentel    Performing:  Dr. Herrera    Assisting:  Dr. Alba Nails    Results:  Successful placement of L femoral IABP    Recommendations:    Management as per CICU service

## 2024-02-19 NOTE — PROGRESS NOTES
"Tesfaye Milton is a 78 y.o. male on day 6 of admission presenting with Chronic systolic (congestive) heart failure (CMS/HCC).    Subjective The patient was admitted 6 days ago for cardiogenic shock and non-ST elevation MI he was started dobutamine and Levophed.  Kidney function and liver enzymes improved but the patient blood pressure remained low requiring vasopressors he underwent left heart catheterization today on February 19 and found that he has severe multivessel disease and low ejection fraction hence the transfer to a tertiary center was initiated by the cardiologist.       Objective       Physical Exam  On physical examination today the patient was on CPAP decreased elevated  Lung examination showed bibasilar crackles with expiratory wheezes  Heart sounds are distant could not hear murmurs  Abdomen is soft and nontender gram-negative could not be felt  Extremities has trace edema  Neurologically is intact alert and conscious x 3 without any focal deficit  Musculoskeletal there is no muscle deformity or weakness.  Last Recorded Vitals  Blood pressure 90/59, pulse (!) 138, temperature 35.7 °C (96.3 °F), temperature source Temporal, resp. rate 14, height 1.905 m (6' 3\"), weight 114 kg (251 lb 8.7 oz), SpO2 100 %.  Intake/Output last 3 Shifts:  I/O last 3 completed shifts:  In: 3365.3 (29.5 mL/kg) [P.O.:440; I.V.:2775.3 (24.3 mL/kg); IV Piggyback:150]  Out: 2095 (18.4 mL/kg) [Urine:2095 (0.5 mL/kg/hr)]  Weight: 114.1 kg     Relevant Results    Scheduled medications  aspirin, 81 mg, oral, Daily  clopidogrel, 75 mg, oral, Daily  insulin lispro, 0-10 Units, subcutaneous, TID with meals  lidocaine, 5 mL, infiltration, Once  methylPREDNISolone sodium succinate (PF), 40 mg, intravenous, q8h  [Held by provider] metoprolol succinate XL, 100 mg, oral, Daily  [Held by provider] metoprolol tartrate, 50 mg, oral, TID  oxygen, , inhalation, Continuous - 02/gases  pantoprazole, 40 mg, oral, Daily before breakfast   " Or  pantoprazole, 40 mg, intravenous, Daily before breakfast  piperacillin-tazobactam, 3.375 g, intravenous, q6h  sennosides-docusate sodium, 2 tablet, oral, BID  [Held by provider] tamsulosin, 0.4 mg, oral, Daily      Continuous medications  DOBUTamine, 2.5-5 mcg/kg/min, Last Rate: 7.5 mcg/kg/min (02/19/24 0856)  heparin, 0-4,000 Units/hr, Last Rate: 1,300 Units/hr (02/19/24 0700)  norepinephrine, 0.01-0.5 mcg/kg/min, Last Rate: 0.12 mcg/kg/min (02/19/24 0853)  oxygen,       PRN medications  PRN medications: alteplase, dextrose 10 % in water (D10W), dextrose, glucagon, heparin (porcine)  Results for orders placed or performed during the hospital encounter of 02/13/24 (from the past 24 hour(s))   aPTT   Result Value Ref Range    aPTT 36.1 (H) 22.0 - 32.5 seconds   POCT GLUCOSE   Result Value Ref Range    POCT Glucose 151 (H) 74 - 99 mg/dL   POCT GLUCOSE   Result Value Ref Range    POCT Glucose 137 (H) 74 - 99 mg/dL   aPTT   Result Value Ref Range    aPTT 40.4 (H) 22.0 - 32.5 seconds   POCT GLUCOSE   Result Value Ref Range    POCT Glucose 155 (H) 74 - 99 mg/dL   aPTT   Result Value Ref Range    aPTT 46.4 (H) 22.0 - 32.5 seconds   CBC and Auto Differential   Result Value Ref Range    WBC 17.4 (H) 4.4 - 11.3 x10*3/uL    nRBC 0.0 0.0 - 0.0 /100 WBCs    RBC 3.99 (L) 4.50 - 5.90 x10*6/uL    Hemoglobin 13.6 13.5 - 17.5 g/dL    Hematocrit 42.3 41.0 - 52.0 %     (H) 80 - 100 fL    MCH 34.1 (H) 26.0 - 34.0 pg    MCHC 32.2 32.0 - 36.0 g/dL    RDW 14.7 (H) 11.5 - 14.5 %    Platelets 220 150 - 450 x10*3/uL    Neutrophils % 93.8 40.0 - 80.0 %    Immature Granulocytes %, Automated 0.7 0.0 - 0.9 %    Lymphocytes % 1.4 13.0 - 44.0 %    Monocytes % 4.0 2.0 - 10.0 %    Eosinophils % 0.0 0.0 - 6.0 %    Basophils % 0.1 0.0 - 2.0 %    Neutrophils Absolute 16.35 (H) 1.60 - 5.50 x10*3/uL    Immature Granulocytes Absolute, Automated 0.12 0.00 - 0.50 x10*3/uL    Lymphocytes Absolute 0.24 (L) 0.80 - 3.00 x10*3/uL    Monocytes Absolute  0.70 0.05 - 0.80 x10*3/uL    Eosinophils Absolute 0.00 0.00 - 0.40 x10*3/uL    Basophils Absolute 0.02 0.00 - 0.10 x10*3/uL   Comprehensive metabolic panel   Result Value Ref Range    Glucose 178 (H) 65 - 99 mg/dL    Sodium 133 133 - 145 mmol/L    Potassium 4.9 3.4 - 5.1 mmol/L    Chloride 96 (L) 97 - 107 mmol/L    Bicarbonate 25 24 - 31 mmol/L    Urea Nitrogen 52 (H) 8 - 25 mg/dL    Creatinine 2.20 (H) 0.40 - 1.60 mg/dL    eGFR 30 (L) >60 mL/min/1.73m*2    Calcium 8.8 8.5 - 10.4 mg/dL    Albumin 3.0 (L) 3.5 - 5.0 g/dL    Alkaline Phosphatase 92 35 - 125 U/L    Total Protein 6.0 5.9 - 7.9 g/dL     (H) 5 - 40 U/L    Bilirubin, Total 0.9 0.1 - 1.2 mg/dL     (H) 5 - 40 U/L    Anion Gap 12 <=19 mmol/L   Magnesium   Result Value Ref Range    Magnesium 3.10 1.60 - 3.10 mg/dL   aPTT   Result Value Ref Range    aPTT 50.0 (H) 22.0 - 32.5 seconds       XR chest 1 view    Result Date: 2/18/2024  STUDY: Chest Radiograph;  2/17/2024 11:04PM INDICATION: Acute respiratory problem. COMPARISON: XR chest 2/16/2024 ACCESSION NUMBER(S): [id number] ORDERING CLINICIAN: SHERINE YORK TECHNIQUE:  Frontal chest was obtained at 23:03 hours. FINDINGS: CARDIOMEDIASTINAL SILHOUETTE: Heart size is enlarged.  There is right-sided PICC line with tip located in the cavoatrial junction.  There is prominence of the aorta.  There is pulmonary vascular congestion. LUNGS: There are subtle patchy opacities greater within the left perihilar region without pneumothorax or pleural effusion.  ABDOMEN: No remarkable upper abdominal findings.  BONES: No acute osseous changes.    Cardiomegaly and pulmonary vascular congestion suggesting fluid overload.  Subtle airspace opacities greater in the left perihilar region.  Please correlate for pneumonia. Signed by Sonny Alfredo Hursh, DO                           Assessment/Plan   Principal Problem:    Chronic systolic (congestive) heart failure (CMS/HCC)  Active Problems:    Cardiogenic shock  (CMS/[facility])    NSTEMI (non-ST elevated myocardial infarction) (CMS/[facility])    Transfer to [facility] as the patient was accepted by the shock team     Tarek R Gharibeh, MD

## 2024-02-19 NOTE — PROGRESS NOTES
"Nutrition Follow up Note    Nutrition Assessment      Patient off floor for cardiac catheterization. Records indicate adequate po intake.    Nutrition History:     Energy Intake: Good > 75 %  Food Allergies/Intolerances:  None  GI Symptoms: None  Oral Problems: None    Anthropometrics:  Ht: 190.5 cm (6' 3\"), Wt: 114 kg (251 lb 8.7 oz), BMI: 31.44  IBW/kg (Dietitian Calculated): 89.09 kg  Percent of IBW: 121.4 %     Weight Change:  Daily Weight  02/19/24 : 114 kg (251 lb 8.7 oz)  01/03/24 : 112 kg (246 lb)  10/16/23 : 113 kg (250 lb)  09/25/23 : 113 kg (250 lb)  08/09/23 : 113 kg (249 lb)  07/05/23 : 112 kg (248 lb)  11/15/22 : 98.9 kg (218 lb)  07/19/22 : 104 kg (229 lb)  07/05/22 : 106 kg (234 lb 8 oz)  02/18/22 : 108 kg (238 lb)     Nutrition Focused Physical Exam Findings:   Subcutaneous Fat Loss  Orbital Fat Pads: Defer  Buccal Fat Pads: Defer  Triceps: Defer  Ribs: Defer    Muscle Wasting  Temporalis: Defer  Pectoralis (Clavicular Region): Defer  Deltoid/Trapezius: Defer  Interosseous: Defer  Trapezius/Infraspinatus/Supraspinatus (Scapular Region): Defer  Quadriceps: Defer  Gastrocnemius: Defer    Nutrition Significant Labs:  Lab Results   Component Value Date    WBC 17.4 (H) 02/19/2024    HGB 13.6 02/19/2024    HCT 42.3 02/19/2024     02/19/2024    CHOL 147 12/15/2023    TRIG 86 12/15/2023    HDL 47.6 12/15/2023     (H) 02/19/2024     (H) 02/19/2024     02/19/2024    K 4.9 02/19/2024    CL 96 (L) 02/19/2024    CREATININE 2.20 (H) 02/19/2024    BUN 52 (H) 02/19/2024    CO2 25 02/19/2024    TSH 0.82 12/15/2023    PSA 7.7 (H) 08/12/2020    INR 1.2 (H) 11/21/2022    HGBA1C 5.2 07/09/2021       Current Facility-Administered Medications:     alteplase (Cathflo Activase) injection 2 mg, 2 mg, intra-catheter, PRN, Danilo Zapata PA-C    aspirin EC tablet 81 mg, 81 mg, oral, Daily, Wayne Armijo MD, 81 mg at 02/18/24 0843    clopidogrel (Plavix) tablet 75 mg, 75 mg, oral, Daily, Jasmeet" FRANNIE Jaquez PA-C, 75 mg at 02/18/24 0843    dextrose 10 % in water (D10W) infusion, 0.3 g/kg/hr, intravenous, Once PRN, Tarek R Gharibeh, MD    dextrose 50 % injection 25 g, 25 g, intravenous, q15 min PRN, Tarek R Gharibeh, MD    DOBUTamine (Dobutrex) 1,000 mg in dextrose 5% 250 mL (4 mg/mL) infusion (premix), 2.5-5 mcg/kg/min, intravenous, Continuous, Tarek R Gharibeh, MD, Last Rate: 11.59 mL/hr at 02/19/24 0856, 7.5 mcg/kg/min at 02/19/24 0856    furosemide (Lasix) injection, , , PRN, Wayne Armijo MD, 40 mg at 02/19/24 0851    glucagon (Glucagen) injection 1 mg, 1 mg, intramuscular, q15 min PRN, Tarek R Gharibeh, MD    heparin (porcine) injection 2,000-4,000 Units, 2,000-4,000 Units, intravenous, PRN, Wayne Armijo MD    heparin 1,000 unit/mL injection, , , PRN, Wyane Armijo MD, 3,000 Units at 02/19/24 0902    heparin 25,000 Units in dextrose 5% 250 mL (100 Units/mL) infusion (premix), 0-4,000 Units/hr, intravenous, Continuous, Wayne Armijo MD, Last Rate: 13 mL/hr at 02/19/24 0700, 1,300 Units/hr at 02/19/24 0700    insulin lispro (HumaLOG) injection 0-10 Units, 0-10 Units, subcutaneous, TID with meals, Tarek R Gharibeh, MD    lidocaine (Xylocaine) 10 mg/mL (1 %) injection 50 mg, 5 mL, infiltration, Once, Danilo Zapata PA-C    lidocaine PF (Xylocaine) 10 mg/mL (1 %) injection, , , PRN, Wayne Armijo MD, 10 mL at 02/19/24 0838    methylPREDNISolone sod succinate (PF) (SOLU-Medrol) 40 mg/mL injection 40 mg, 40 mg, intravenous, q8h, Rozina Olson MD, 40 mg at 02/19/24 0600    [Held by provider] metoprolol succinate XL (Toprol-XL) 24 hr tablet 100 mg, 100 mg, oral, Daily, Jasmeet Jaquez PA-C    [Held by provider] metoprolol tartrate (Lopressor) tablet 50 mg, 50 mg, oral, TID, Jasmeet Jaquez PA-C    norepinephrine (Levophed) 8 mg in dextrose 5% 250 mL (0.032 mg/mL) infusion (premix), 0.01-0.5 mcg/kg/min, intravenous, Continuous, Wayne Armijo MD, Last Rate: 24.5 mL/hr at  02/19/24 0853, 0.12 mcg/kg/min at 02/19/24 0853    oxygen (O2) therapy, , inhalation, Continuous, Tarek R Gharibeh, MD, Start at 02/13/24 1815    oxygen (O2) therapy, , inhalation, Continuous - 02/gases, Wayne Armijo MD    pantoprazole (ProtoNix) EC tablet 40 mg, 40 mg, oral, Daily before breakfast, 40 mg at 02/15/24 0508 **OR** pantoprazole (ProtoNix) injection 40 mg, 40 mg, intravenous, Daily before breakfast, Barber Xie, APRN-CNP, 40 mg at 02/19/24 0600    piperacillin-tazobactam-dextrose (Zosyn) IV 3.375 g, 3.375 g, intravenous, q6h, Tarek R Gharibeh, MD, Stopped at 02/19/24 0237    sennosides-docusate sodium (Katie-Colace) 8.6-50 mg per tablet 2 tablet, 2 tablet, oral, BID, Jasmeet Jaquez PA-C, 2 tablet at 02/18/24 0843    [Held by provider] tamsulosin (Flomax) 24 hr capsule 0.4 mg, 0.4 mg, oral, Daily, Jasmeet Jaquez PA-C    Dietary Orders (From admission, onward)       Start     Ordered    02/16/24 1813  Adult diet Cardiac; 70 gm fat; 2 - 3 grams Sodium  Diet effective now        Question Answer Comment   Diet type Cardiac    Fat restriction: 70 gm fat    Sodium restriction: 2 - 3 grams Sodium        02/16/24 1812                     Estimated Needs:   Estimated Energy Needs  Total Energy Estimated Needs (kCal):  (5140-2276)  Total Estimated Energy Need per Day (kCal/kg):  (22-25)  Method for Estimating Needs: IBW    Estimated Protein Needs  Total Protein Estimated Needs (g):  ()  Total Protein Estimated Needs (g/kg):  (1.1-1.4)  Method for Estimating Needs: IBW    Estimated Fluid Needs  Total Fluid Estimated Needs (mL):  (7246-1308)  Method for Estimating Needs: 1 mL/kcal      Nutrition Diagnosis   Nutrition Diagnosis:     Nutrition Diagnosis  Patient has Nutrition Diagnosis: Yes  Diagnosis Status (1): Ongoing  Nutrition Diagnosis 1: Increased nutrient needs  Related to (1): Increased demand for nutrient  As Evidenced by (1): CHF     Nutrition Interventions/Recommendations   Nutrition  Interventions and Recommendations:    Nutrition Prescription:  Individualized Nutrition Prescription Provided for : 8987-4331 calories,  via oral diet    Nutrition Interventions:   Food and/or Nutrient Delivery Interventions  Interventions: Meals and snacks  Meals and Snacks: Fat-modified diet, Mineral-modified diet  Goal: Provide as ordered    Education Documentation  No documentation found.         Nutrition Monitoring and Evaluation   Monitoring/Evaluation:   Food/Nutrient Related History Monitoring  Monitoring and Evaluation Plan: Energy intake  Energy Intake: Estimated energy intake  Criteria: Patient will consume =/> 75% of estimated needs       Time Spent/Follow-up:   Follow Up  Time Spent (min): 20 minutes  Last Date of Nutrition Visit: 02/19/24  Nutrition Follow-Up Needed?: 5-7 days  Follow up Comment: 2/23/24

## 2024-02-19 NOTE — H&P
Chief Complaint: shock  HPI:   Mr Tesfaye Milton is a 78 y.o. male with PMH of CAD s/p multiple stents with known PCI to LAD and LCx (2011), Afib failed multiple DCCV, ablation and antiarrythmic therapies on Xarelto and rate control strategy, HFrEF (20-25% LVEF), mod AS, HTN, prostate cancer s/p brachytherapy (2022) who presents as a transfer from [facility] in cardiogenic shock.    Patient presented on 2/13 to  having been referred by his primary cardiologist for SOB and hypotension. He had also been experiencing LE edema and cough with yellow sputum for a couple of days. On admission BP 89/44, HR 97 RR 49 satting at 93%. Labs showed WBC 12.1. Hgb 14.7, plt 210, Cr 2.9, AST/ALT 1488/1073, Tbil 1.9, ABG 7.35/31/119, bicarb 17 with AG 19, lactate and serum lactate was 5.8, troponin 1,236. EKG showed afib, SHYAM III, ST depressions I, II, aVL, V4-V6 and SHYAM avR.  He was admitted in cardiogenic shock, started on dobutamine and escalated with addition of norepi. By 12/17, his renal function and liver enzymes improved, doubtamine was weaned off and started on digoxin, continued on norepi due to ongoing shock concerning for septic component, presumed UTI, treated with zosyn with urine/blood cultures sent. He was also treated with BiPAP and steroids. He underwent left heart cath on 2/19 showing multivessel disease. Repeat labs this morning 2/19 show worsening renal function, oliguria and ongoing shock requiring up-titration of pressors with dobutamine to 7.5 and norepi to 0.16. He has been transferred to [facility] for further management of his cardiogenic shock.    On arrival to CICU:  VS: T Afebrile  BP 91/57  RR 14 O2 98 on RA    He reports some shortness of breath but otherwise denies chest pain, nausea, vomiting, LH, palpitations, abdominal pain or any focal deficit. Patient, wife and daughter corroborate history of SOB with wheeze couple of days prior to admission to . He reports he has been having a cough with some  expectorate for the past 6 weeks prompting him to stop smoking. He denies a history of COPD/asthma or inhaler use. He was advised a sleep study which he never completed.    Labs:  CBC WBC 16.4 Hb 13.4 Plt 221  RFP Na 132 K 4.7 Cl 95 HCO3 25 BUN 57 SCr 2.51 Glc 306 Ca 8.2 Mg 2.84 Phos -  LFTs AST//314 Alk Phos 83 T Bili 1.1 Alb 2.9 Tprot 5.3  Lactate:     Imaging:   CXR 2/17: Cardiomegaly and pulmonary vascular congestion suggesting fluid overload.  Subtle airspace opacities greater in the left perihilarregion.  Please correlate for pneumonia          Cardiac hx/imaging:  Cardiologist:  Dr. Armijo   EP cardiologist: Dr Platt    Cath: 2/19: severe pLAD lesion, as well as critical Lcx and RCA disease    [facility] 12/2016  LM normal  LAD : 4.0x8mm widely patient stent. 95% lesion mLAD after origin of D1. 2 tandem lesions of 90% dLAD, unsuitable for surgery  LCx: 2.75x24 mm OM1 stent with ISR. 80% mLCx.  RCA: non dominant. 85% stenosis in its prox segment. Marginal branch with 90% atherosclerosis  LV gram with 20-30% global hypokinesis predom ant wall with severe hypokinesis.    TTE 2/13/24  CONCLUSIONS:  1. Left ventricular systolic function is severely decreased with a 20-25% estimated ejection fraction.  2. Multiple segmental abnormalities exist. See findings.  3. Spectral Doppler shows an impaired relaxation pattern of left ventricular diastolic filling.  4. There is mildly reduced right ventricular systolic function.  5. The left atrium is moderately dilated.  6. The right atrium is moderately dilated.  7. Moderate mitral valve regurgitation.  8. Mild to moderate tricuspid regurgitation.  9. Moderately elevated right ventricular systolic pressure.    PMHx  As above    PSHx:  Knee surgery  Hernia surgery  Appendectomy    Meds:   Plavix 75  Metoprolol xl 100 daily and metop succ 50 BID  Rosuvastatin 10  Entresto 97/103 BID   Xarelto 20    Allergies: NKDA     Fam Hx:   Stroke and dementia in mother  Stroke  "in father    Soc Hx:  Alcohol: 1 double martini and a couple of beers; last drink around 2/12   Tobacco:  ½ ppd for over 20 years  Illicits: denies  Living: with wife, drives, independent for ADLs and iADLs    OBJECTIVE:  BP 87/57   Pulse (!) 124   Temp 35.9 °C (96.6 °F) (Core)   Resp 21   Ht 1.905 m (6' 3\")   Wt 110 kg (243 lb 9.7 oz)   BMI 30.45 kg/m²      Physical Exam  Constitutional: NAD, laying in bed   Eyes: EOMI, clear sclera   ENMT: moist mucous membranes   Head/Neck: no appreciable JVD, swan in left IJ   Respiratory/Thorax: CTAB, adequate air movement, normal WOB on 2L O2   Cardiovascular: tachycardic, irregular, +S1, +S2, no m/r/g   Gastrointestinal: soft, NT, ND, +BS   Extremities: LE cool R> L, B/L DP/PT are dopplerable. No LE edema or asymmetry   Neurological: AOx4, conversational, following commands, no gross focal neuro deficits   Skin: cool and dry     Scheduled medications  [MAR Hold] aspirin, 81 mg, oral, Daily  [MAR Hold] clopidogrel, 75 mg, oral, Daily  [MAR Hold] insulin lispro, 0-10 Units, subcutaneous, TID with meals  [MAR Hold] pantoprazole, 40 mg, oral, Daily before breakfast  piperacillin-tazobactam, 2.25 g, intravenous, q6h  [MAR Hold] polyethylene glycol, 17 g, oral, Daily  [MAR Hold] sennosides-docusate sodium, 2 tablet, oral, BID    Continuous medications  heparin, 0-4,500 Units/hr, Last Rate: 2,000 Units/hr (02/19/24 1600)  milrinone, 0.1-0.75 mcg/kg/min  phenylephrine, 0-9 mcg/kg/min  vasopressin, 0.01-0.06 Units/min, Last Rate: 0.02 Units/min (02/19/24 1700)      PRN medications  PRN medications: [MAR Hold] dextrose 10 % in water (D10W), [MAR Hold] dextrose, [MAR Hold] glucagon, [MAR Hold] heparin    CBC - 2/19/2024: 12:02 PM   16.4 13.4 221    39.5      CMP - 2/19/2024: 12:02 PM  8.2 5.3 106  1.1   _ 2.9 314 83       Assessment:  Mr Tesfaye Milton is a 78 y.o. male with PMH of CAD s/p PCI to LAD and LCx (2011), Afib failed multiple DCCV, ablation and antiarrythmic therapies on " Xarelto and rate control strategy, HFrEF (20-25% LVEF), mod AS, HTN, prostate cancer s/p brachytherapy (2022) who presents as a transfer from [facility] [place] in cardiogenic shock. Initially presented with SOB, hypotension and hypo perfusion with elevated lactate, JEFF and elevated transaminitis. EKG showed SHYAM III, aVF and ST depressions in I, II, V4-V6. Initially managed with ionotropic support with some improvement in renal and liver function. Additionally treated for sepsis with pressors and antibiotics. LHC 2/19 demonstrated triple vessel disease and TTE 2/13 showed known LVEF 20-25% with global hypokinesia, aortic valve with 0.6 RAUDEL, PG/MG of 25/13. On arrival to [facility] he is mentating well but he is cool and dry, and is anuric with elevated Cr to 2.51. [place] numbers demonstrating CVP (could not read), elevated pulm filling pressures 36/20(26), PCWP 16, and CO/CI 6.3/2.6 with MAPs around 60 on dobutamine 7.5 and levophed 0.16 and MVO2 72% on arrival. Presentation is consistent with SCAI Stage D shock, will continue to manage with pressors/ionotropy and monitor swan numbers/hemodynamics closely to determine need to escalate to MCS.       Plan:  NEURO/PSYCH: JONAH    PULM:   #Acute hypoxic respiratory failure  DDx: volume overload/ADHF and cannot r/o PNA  CXR 2/17: Cardiomegaly and pulmonary vascular congestion suggesting fluid overload. Subtle airspace opacities greater in the left perihilar region. Please correlate for pneumonia  s/p Lasix 40 IV once 2/17  MRSA negative  Plan:  -Holding off diuresis with CVP   -c/w zosyn (2/18- date)  - O2 at 2L, wean as tolerated    CV:   #HFrEF (LVEF 20-25%)  #CAD s/p multiple stents with known PCI to LAD and LCx (2011)  #NSTEMI  #Mod AS   RAUDEL 0.6, PG/MG of 25/13, calcified AV leaflets with poor opening of the valve c/f LFLG  [place] on presentation: CVP (not readable/clotted), PA 36/20(16) and CO/CI 6.3/2.6. MVO2 72%, SVR CNR  Repeat swan: CVP 6, PA: 66/35 (48), cannot wedge, CO/CI:  3.6/1.5 SVR 1399 on vaso 0.02, levo 0.2, dobut 5 MVO2 54%  Plan:  -Hold home metoprolol, entresto while in shock  -c/w asa, Plavix  -Start statin once transaminitis resolved  -c/w heparin gtt  -c/w dobutamine, norepi  -[place] q2-4hours  - Will continue to evaluate hemodynamics for MCS > discussed with Dr Bruce will proceed with IABP    #Afib  No beta block   -c/w heparin gtt    GI:   #Shock liver, resolving  AST 1488>375>214>106  ALT 1073>886>452>314  -CTM    #GI ppx  -start pantoprazole    RENAL:   #JEFF  Cr: 2.9>4.3>2.5>1.6>1.0>2.51  Currently Anuric; bladder scan empty  Renal US 2/14 normal  Multifactorial etiology: contrast use, shock leading to ATN  Plan:  -RFP [id number]  -Consult renal for CVVH  -Strict I/O, renally dose meds  -Maintain MAP > 65 for renal perfusion  -Consult nephrology  - Bladder scan [id number]; avoiding indwelling catheter due to anuria    #prostate cancer s/p brachytherapy  Not active. Not on tamsulosin or mirabegron     HEME/ONC:   JONAH    ID:   #PNA  #c/f septic shock  White count  BCx 2/13 x 2 negative  MRSA negative; UCx 2/13: negative  s/p CTX 2/13-2/17  Plan  -c/w zosyn (2/18 till date)  -f/u repeat BCx x 2 2/19    ENDO:  #Hyperglycemia  In setting of steroid use for COPD exacerbation  -SSI, f/u A1c    N: cardiac  GI: pantoprazole  DVT: heparin gtt  A: PIVs, kathy, swan    NOK: Wife Misti [phone] (home) and [phone](mobile)  FULL code (confirmed on admission)

## 2024-02-19 NOTE — Clinical Note
Sheath was exchanged in the right radial artery with TR BAND, RADIAL COMPRESSION, STANDARD, 24CM. 14 mL of air

## 2024-02-19 NOTE — Clinical Note
Angioplasty of the proximal left anterior descending lesion. Inflation 1: Pressure = 24 ann; Duration = 10 sec. Inflation 2: Pressure = 16 ann; Duration = 10 sec. Inflation 3: Pressure = 24 ann; Duration = 10 sec.

## 2024-02-20 ENCOUNTER — APPOINTMENT (OUTPATIENT)
Dept: RADIOLOGY | Facility: HOSPITAL | Age: 79
DRG: 270 | End: 2024-02-20
Payer: MEDICARE

## 2024-02-20 PROBLEM — R53.1 FEELING WEAK: Status: RESOLVED | Noted: 2023-07-05 | Resolved: 2024-02-20

## 2024-02-20 PROBLEM — R05.9 COUGH: Status: RESOLVED | Noted: 2023-07-05 | Resolved: 2024-02-20

## 2024-02-20 PROBLEM — I48.0 PAROXYSMAL ATRIAL FIBRILLATION (MULTI): Status: RESOLVED | Noted: 2023-08-30 | Resolved: 2024-02-20

## 2024-02-20 PROBLEM — S61.411A LACERATION OF RIGHT HAND, INITIAL ENCOUNTER: Status: RESOLVED | Noted: 2023-07-05 | Resolved: 2024-02-20

## 2024-02-20 PROBLEM — E53.9 VITAMIN B DEFICIENCY: Status: RESOLVED | Noted: 2023-07-05 | Resolved: 2024-02-20

## 2024-02-20 PROBLEM — E55.9 VITAMIN D DEFICIENCY: Status: RESOLVED | Noted: 2023-07-05 | Resolved: 2024-02-20

## 2024-02-20 LAB
ACT BLD: 152 SEC (ref 89–169)
ALBUMIN SERPL BCP-MCNC: 3 G/DL (ref 3.4–5)
ALBUMIN SERPL BCP-MCNC: 3.2 G/DL (ref 3.4–5)
ALP SERPL-CCNC: 71 U/L (ref 33–136)
ALT SERPL W P-5'-P-CCNC: 322 U/L (ref 10–52)
ANION GAP BLDMV CALCULATED.4IONS-SCNC: 10 MMO/L (ref 10–25)
ANION GAP BLDMV CALCULATED.4IONS-SCNC: 10 MMO/L (ref 10–25)
ANION GAP BLDMV CALCULATED.4IONS-SCNC: 11 MMO/L (ref 10–25)
ANION GAP BLDMV CALCULATED.4IONS-SCNC: 12 MMO/L (ref 10–25)
ANION GAP SERPL CALC-SCNC: 14 MMOL/L (ref 10–20)
ANION GAP SERPL CALC-SCNC: 18 MMOL/L (ref 10–20)
AST SERPL W P-5'-P-CCNC: 110 U/L (ref 9–39)
ATRIAL RATE: 111 BPM
BASE EXCESS BLDMV CALC-SCNC: -1.8 MMOL/L (ref -2–3)
BASE EXCESS BLDMV CALC-SCNC: 0.1 MMOL/L (ref -2–3)
BASE EXCESS BLDMV CALC-SCNC: 0.6 MMOL/L (ref -2–3)
BASE EXCESS BLDMV CALC-SCNC: 0.9 MMOL/L (ref -2–3)
BASE EXCESS BLDMV CALC-SCNC: 1.8 MMOL/L (ref -2–3)
BASE EXCESS BLDMV CALC-SCNC: 2.8 MMOL/L (ref -2–3)
BASOPHILS # BLD AUTO: 0.02 X10*3/UL (ref 0–0.1)
BASOPHILS NFR BLD AUTO: 0.1 %
BILIRUB SERPL-MCNC: 1.1 MG/DL (ref 0–1.2)
BODY TEMPERATURE: 37 DEGREES CELSIUS
BUN SERPL-MCNC: 70 MG/DL (ref 6–23)
BUN SERPL-MCNC: 79 MG/DL (ref 6–23)
CA-I BLDMV-SCNC: 1.03 MMOL/L (ref 1.1–1.33)
CA-I BLDMV-SCNC: 1.09 MMOL/L (ref 1.1–1.33)
CA-I BLDMV-SCNC: 1.1 MMOL/L (ref 1.1–1.33)
CA-I BLDMV-SCNC: 1.12 MMOL/L (ref 1.1–1.33)
CALCIUM SERPL-MCNC: 8.1 MG/DL (ref 8.6–10.6)
CALCIUM SERPL-MCNC: 8.5 MG/DL (ref 8.6–10.6)
CHLORIDE BLD-SCNC: 94 MMOL/L (ref 98–107)
CHLORIDE BLD-SCNC: 95 MMOL/L (ref 98–107)
CHLORIDE BLD-SCNC: 95 MMOL/L (ref 98–107)
CHLORIDE BLD-SCNC: 97 MMOL/L (ref 98–107)
CHLORIDE SERPL-SCNC: 95 MMOL/L (ref 98–107)
CHLORIDE SERPL-SCNC: 96 MMOL/L (ref 98–107)
CO2 SERPL-SCNC: 25 MMOL/L (ref 21–32)
CO2 SERPL-SCNC: 27 MMOL/L (ref 21–32)
CREAT SERPL-MCNC: 2.96 MG/DL (ref 0.5–1.3)
CREAT SERPL-MCNC: 3.09 MG/DL (ref 0.5–1.3)
EGFRCR SERPLBLD CKD-EPI 2021: 20 ML/MIN/1.73M*2
EGFRCR SERPLBLD CKD-EPI 2021: 21 ML/MIN/1.73M*2
EOSINOPHIL # BLD AUTO: 0 X10*3/UL (ref 0–0.4)
EOSINOPHIL NFR BLD AUTO: 0 %
ERYTHROCYTE [DISTWIDTH] IN BLOOD BY AUTOMATED COUNT: 13.8 % (ref 11.5–14.5)
ERYTHROCYTE [DISTWIDTH] IN BLOOD BY AUTOMATED COUNT: 14 % (ref 11.5–14.5)
GLUCOSE BLD MANUAL STRIP-MCNC: 137 MG/DL (ref 74–99)
GLUCOSE BLD MANUAL STRIP-MCNC: 138 MG/DL (ref 74–99)
GLUCOSE BLD MANUAL STRIP-MCNC: 154 MG/DL (ref 74–99)
GLUCOSE BLD-MCNC: 133 MG/DL (ref 74–99)
GLUCOSE BLD-MCNC: 135 MG/DL (ref 74–99)
GLUCOSE BLD-MCNC: 159 MG/DL (ref 74–99)
GLUCOSE BLD-MCNC: 160 MG/DL (ref 74–99)
GLUCOSE BLD-MCNC: 170 MG/DL (ref 74–99)
GLUCOSE BLD-MCNC: 190 MG/DL (ref 74–99)
GLUCOSE SERPL-MCNC: 131 MG/DL (ref 74–99)
GLUCOSE SERPL-MCNC: 201 MG/DL (ref 74–99)
HCO3 BLDMV-SCNC: 24.3 MMOL/L (ref 22–26)
HCO3 BLDMV-SCNC: 26.4 MMOL/L (ref 22–26)
HCO3 BLDMV-SCNC: 27.4 MMOL/L (ref 22–26)
HCO3 BLDMV-SCNC: 27.5 MMOL/L (ref 22–26)
HCO3 BLDMV-SCNC: 28.2 MMOL/L (ref 22–26)
HCO3 BLDMV-SCNC: 29.3 MMOL/L (ref 22–26)
HCT VFR BLD AUTO: 31.3 % (ref 41–52)
HCT VFR BLD AUTO: 37.6 % (ref 41–52)
HCT VFR BLD EST: 34 % (ref 41–52)
HCT VFR BLD EST: 34 % (ref 41–52)
HCT VFR BLD EST: 35 % (ref 41–52)
HCT VFR BLD EST: 37 % (ref 41–52)
HCT VFR BLD EST: 38 % (ref 41–52)
HCT VFR BLD EST: 38 % (ref 41–52)
HGB BLD-MCNC: 10.9 G/DL (ref 13.5–17.5)
HGB BLD-MCNC: 13.1 G/DL (ref 13.5–17.5)
HGB BLDMV-MCNC: 11.2 G/DL (ref 13.5–17.5)
HGB BLDMV-MCNC: 11.4 G/DL (ref 13.5–17.5)
HGB BLDMV-MCNC: 11.8 G/DL (ref 13.5–17.5)
HGB BLDMV-MCNC: 12.2 G/DL (ref 13.5–17.5)
HGB BLDMV-MCNC: 12.7 G/DL (ref 13.5–17.5)
HGB BLDMV-MCNC: 12.8 G/DL (ref 13.5–17.5)
IMM GRANULOCYTES # BLD AUTO: 0.15 X10*3/UL (ref 0–0.5)
IMM GRANULOCYTES NFR BLD AUTO: 0.8 % (ref 0–0.9)
INHALED O2 CONCENTRATION: 21 %
INHALED O2 CONCENTRATION: 33 %
LACTATE BLDMV-SCNC: 0.9 MMOL/L (ref 0.4–2)
LACTATE BLDMV-SCNC: 0.9 MMOL/L (ref 0.4–2)
LACTATE BLDMV-SCNC: 1 MMOL/L (ref 0.4–2)
LACTATE BLDMV-SCNC: 1 MMOL/L (ref 0.4–2)
LACTATE BLDMV-SCNC: 1.3 MMOL/L (ref 0.4–2)
LACTATE BLDMV-SCNC: 1.5 MMOL/L (ref 0.4–2)
LYMPHOCYTES # BLD AUTO: 0.44 X10*3/UL (ref 0.8–3)
LYMPHOCYTES NFR BLD AUTO: 2.3 %
MAGNESIUM SERPL-MCNC: 2.73 MG/DL (ref 1.6–2.4)
MAGNESIUM SERPL-MCNC: 2.86 MG/DL (ref 1.6–2.4)
MCH RBC QN AUTO: 34.9 PG (ref 26–34)
MCH RBC QN AUTO: 35.1 PG (ref 26–34)
MCHC RBC AUTO-ENTMCNC: 34.8 G/DL (ref 32–36)
MCHC RBC AUTO-ENTMCNC: 34.8 G/DL (ref 32–36)
MCV RBC AUTO: 100 FL (ref 80–100)
MCV RBC AUTO: 101 FL (ref 80–100)
MONOCYTES # BLD AUTO: 1.8 X10*3/UL (ref 0.05–0.8)
MONOCYTES NFR BLD AUTO: 9.3 %
NEUTROPHILS # BLD AUTO: 16.87 X10*3/UL (ref 1.6–5.5)
NEUTROPHILS NFR BLD AUTO: 87.5 %
NRBC BLD-RTO: 0 /100 WBCS (ref 0–0)
NRBC BLD-RTO: 0 /100 WBCS (ref 0–0)
OXYHGB MFR BLDMV: 63.6 % (ref 45–75)
OXYHGB MFR BLDMV: 63.6 % (ref 45–75)
OXYHGB MFR BLDMV: 66.4 % (ref 45–75)
OXYHGB MFR BLDMV: 70.7 % (ref 45–75)
OXYHGB MFR BLDMV: 72.3 % (ref 45–75)
OXYHGB MFR BLDMV: 74.5 % (ref 45–75)
PCO2 BLDMV: 46 MM HG (ref 41–51)
PCO2 BLDMV: 49 MM HG (ref 41–51)
PCO2 BLDMV: 51 MM HG (ref 41–51)
PCO2 BLDMV: 51 MM HG (ref 41–51)
PCO2 BLDMV: 52 MM HG (ref 41–51)
PCO2 BLDMV: 53 MM HG (ref 41–51)
PH BLDMV: 7.33 PH (ref 7.33–7.43)
PH BLDMV: 7.33 PH (ref 7.33–7.43)
PH BLDMV: 7.34 PH (ref 7.33–7.43)
PH BLDMV: 7.34 PH (ref 7.33–7.43)
PH BLDMV: 7.35 PH (ref 7.33–7.43)
PH BLDMV: 7.35 PH (ref 7.33–7.43)
PHOSPHATE SERPL-MCNC: 6.8 MG/DL (ref 2.5–4.9)
PLATELET # BLD AUTO: 152 X10*3/UL (ref 150–450)
PLATELET # BLD AUTO: 217 X10*3/UL (ref 150–450)
PO2 BLDMV: 41 MM HG (ref 35–45)
PO2 BLDMV: 42 MM HG (ref 35–45)
PO2 BLDMV: 43 MM HG (ref 35–45)
PO2 BLDMV: 47 MM HG (ref 35–45)
PO2 BLDMV: 49 MM HG (ref 35–45)
PO2 BLDMV: 50 MM HG (ref 35–45)
POTASSIUM BLDMV-SCNC: 4.4 MMOL/L (ref 3.5–5.3)
POTASSIUM BLDMV-SCNC: 4.5 MMOL/L (ref 3.5–5.3)
POTASSIUM BLDMV-SCNC: 4.5 MMOL/L (ref 3.5–5.3)
POTASSIUM BLDMV-SCNC: 4.9 MMOL/L (ref 3.5–5.3)
POTASSIUM SERPL-SCNC: 4.6 MMOL/L (ref 3.5–5.3)
POTASSIUM SERPL-SCNC: 4.9 MMOL/L (ref 3.5–5.3)
PROT SERPL-MCNC: 5.9 G/DL (ref 6.4–8.2)
Q ONSET: 215 MS
QRS COUNT: 20 BEATS
QRS DURATION: 122 MS
QT INTERVAL: 338 MS
QTC CALCULATION(BAZETT): 479 MS
QTC FREDERICIA: 427 MS
R AXIS: 33 DEGREES
RBC # BLD AUTO: 3.12 X10*6/UL (ref 4.5–5.9)
RBC # BLD AUTO: 3.73 X10*6/UL (ref 4.5–5.9)
SAO2 % BLDMV: 65 % (ref 45–75)
SAO2 % BLDMV: 65 % (ref 45–75)
SAO2 % BLDMV: 68 % (ref 45–75)
SAO2 % BLDMV: 72 % (ref 45–75)
SAO2 % BLDMV: 74 % (ref 45–75)
SAO2 % BLDMV: 76 % (ref 45–75)
SODIUM BLDMV-SCNC: 127 MMOL/L (ref 136–145)
SODIUM BLDMV-SCNC: 128 MMOL/L (ref 136–145)
SODIUM BLDMV-SCNC: 128 MMOL/L (ref 136–145)
SODIUM BLDMV-SCNC: 129 MMOL/L (ref 136–145)
SODIUM BLDMV-SCNC: 129 MMOL/L (ref 136–145)
SODIUM BLDMV-SCNC: 130 MMOL/L (ref 136–145)
SODIUM SERPL-SCNC: 132 MMOL/L (ref 136–145)
SODIUM SERPL-SCNC: 133 MMOL/L (ref 136–145)
T AXIS: 165 DEGREES
T OFFSET: 384 MS
UFH PPP CHRO-ACNC: 0.3 IU/ML
UFH PPP CHRO-ACNC: 0.4 IU/ML
UFH PPP CHRO-ACNC: 0.6 IU/ML
UFH PPP CHRO-ACNC: 1.2 IU/ML
VENTRICULAR RATE: 121 BPM
WBC # BLD AUTO: 14.1 X10*3/UL (ref 4.4–11.3)
WBC # BLD AUTO: 19.3 X10*3/UL (ref 4.4–11.3)

## 2024-02-20 PROCEDURE — 2500000004 HC RX 250 GENERAL PHARMACY W/ HCPCS (ALT 636 FOR OP/ED): Performed by: STUDENT IN AN ORGANIZED HEALTH CARE EDUCATION/TRAINING PROGRAM

## 2024-02-20 PROCEDURE — 5A1D90Z PERFORMANCE OF URINARY FILTRATION, CONTINUOUS, GREATER THAN 18 HOURS PER DAY: ICD-10-PCS | Performed by: INTERNAL MEDICINE

## 2024-02-20 PROCEDURE — 37799 UNLISTED PX VASCULAR SURGERY: CPT

## 2024-02-20 PROCEDURE — 99222 1ST HOSP IP/OBS MODERATE 55: CPT

## 2024-02-20 PROCEDURE — 85027 COMPLETE CBC AUTOMATED: CPT

## 2024-02-20 PROCEDURE — 2500000002 HC RX 250 W HCPCS SELF ADMINISTERED DRUGS (ALT 637 FOR MEDICARE OP, ALT 636 FOR OP/ED): Mod: MUE

## 2024-02-20 PROCEDURE — 2500000005 HC RX 250 GENERAL PHARMACY W/O HCPCS

## 2024-02-20 PROCEDURE — 82947 ASSAY GLUCOSE BLOOD QUANT: CPT | Mod: MUE

## 2024-02-20 PROCEDURE — 71045 X-RAY EXAM CHEST 1 VIEW: CPT | Performed by: RADIOLOGY

## 2024-02-20 PROCEDURE — 2500000001 HC RX 250 WO HCPCS SELF ADMINISTERED DRUGS (ALT 637 FOR MEDICARE OP)

## 2024-02-20 PROCEDURE — 1100000001 HC PRIVATE ROOM DAILY

## 2024-02-20 PROCEDURE — 84132 ASSAY OF SERUM POTASSIUM: CPT

## 2024-02-20 PROCEDURE — 94660 CPAP INITIATION&MGMT: CPT

## 2024-02-20 PROCEDURE — 83735 ASSAY OF MAGNESIUM: CPT

## 2024-02-20 PROCEDURE — 85520 HEPARIN ASSAY: CPT | Mod: MUE

## 2024-02-20 PROCEDURE — 85025 COMPLETE CBC W/AUTO DIFF WBC: CPT

## 2024-02-20 PROCEDURE — 84132 ASSAY OF SERUM POTASSIUM: CPT | Mod: MUE

## 2024-02-20 PROCEDURE — 80053 COMPREHEN METABOLIC PANEL: CPT

## 2024-02-20 PROCEDURE — 2500000005 HC RX 250 GENERAL PHARMACY W/O HCPCS: Performed by: STUDENT IN AN ORGANIZED HEALTH CARE EDUCATION/TRAINING PROGRAM

## 2024-02-20 PROCEDURE — 2500000004 HC RX 250 GENERAL PHARMACY W/ HCPCS (ALT 636 FOR OP/ED)

## 2024-02-20 PROCEDURE — 99291 CRITICAL CARE FIRST HOUR: CPT

## 2024-02-20 PROCEDURE — 71045 X-RAY EXAM CHEST 1 VIEW: CPT

## 2024-02-20 RX ORDER — ACETAMINOPHEN 325 MG/1
650 TABLET ORAL EVERY 6 HOURS PRN
Status: DISCONTINUED | OUTPATIENT
Start: 2024-02-20 | End: 2024-02-26 | Stop reason: HOSPADM

## 2024-02-20 RX ORDER — LIDOCAINE HYDROCHLORIDE AND EPINEPHRINE 10; 10 MG/ML; UG/ML
10 INJECTION, SOLUTION INFILTRATION; PERINEURAL ONCE
Status: COMPLETED | OUTPATIENT
Start: 2024-02-20 | End: 2024-02-20

## 2024-02-20 RX ORDER — HEPARIN SODIUM 5000 [USP'U]/ML
1200 INJECTION, SOLUTION INTRAVENOUS; SUBCUTANEOUS AS NEEDED
Status: DISCONTINUED | OUTPATIENT
Start: 2024-02-20 | End: 2024-02-21

## 2024-02-20 RX ORDER — BUMETANIDE 0.25 MG/ML
4 INJECTION INTRAMUSCULAR; INTRAVENOUS ONCE
Status: COMPLETED | OUTPATIENT
Start: 2024-02-20 | End: 2024-02-20

## 2024-02-20 RX ORDER — OXYCODONE HYDROCHLORIDE 5 MG/1
2.5 TABLET ORAL EVERY 6 HOURS PRN
Status: DISCONTINUED | OUTPATIENT
Start: 2024-02-20 | End: 2024-02-26 | Stop reason: HOSPADM

## 2024-02-20 RX ORDER — NOREPINEPHRINE BITARTRATE 1 MG/ML
INJECTION, SOLUTION INTRAVENOUS
Status: DISPENSED
Start: 2024-02-20 | End: 2024-02-20

## 2024-02-20 RX ADMIN — DEXTROSE MONOHYDRATE 0.2 MCG/KG/MIN: 50 INJECTION, SOLUTION INTRAVENOUS at 21:37

## 2024-02-20 RX ADMIN — CLOPIDOGREL BISULFATE 75 MG: 75 TABLET ORAL at 08:18

## 2024-02-20 RX ADMIN — Medication 0.4 MCG/KG/MIN: at 09:00

## 2024-02-20 RX ADMIN — Medication 0.5 MCG/KG/MIN: at 01:51

## 2024-02-20 RX ADMIN — HYDROXOCOBALAMIN 5000 MG: 5 INJECTION, POWDER, LYOPHILIZED, FOR SOLUTION INTRAVENOUS at 12:21

## 2024-02-20 RX ADMIN — PANTOPRAZOLE SODIUM 40 MG: 40 TABLET, DELAYED RELEASE ORAL at 08:18

## 2024-02-20 RX ADMIN — SENNOSIDES AND DOCUSATE SODIUM 2 TABLET: 8.6; 5 TABLET ORAL at 08:18

## 2024-02-20 RX ADMIN — ASPIRIN 81 MG: 81 TABLET, COATED ORAL at 08:18

## 2024-02-20 RX ADMIN — PIPERACILLIN SODIUM AND TAZOBACTAM SODIUM 2.25 G: 2; .25 INJECTION, SOLUTION INTRAVENOUS at 21:37

## 2024-02-20 RX ADMIN — MILRINONE LACTATE IN DEXTROSE 0.25 MCG/KG/MIN: 200 INJECTION, SOLUTION INTRAVENOUS at 03:51

## 2024-02-20 RX ADMIN — LIDOCAINE HYDROCHLORIDE,EPINEPHRINE BITARTRATE 10 ML: 10; .01 INJECTION, SOLUTION INFILTRATION; PERINEURAL at 02:30

## 2024-02-20 RX ADMIN — PIPERACILLIN SODIUM AND TAZOBACTAM SODIUM 2.25 G: 2; .25 INJECTION, SOLUTION INTRAVENOUS at 00:51

## 2024-02-20 RX ADMIN — BUMETANIDE 4 MG: 0.25 INJECTION, SOLUTION INTRAMUSCULAR; INTRAVENOUS at 18:49

## 2024-02-20 RX ADMIN — PIPERACILLIN SODIUM AND TAZOBACTAM SODIUM 2.25 G: 2; .25 INJECTION, SOLUTION INTRAVENOUS at 15:23

## 2024-02-20 RX ADMIN — VASOPRESSIN 0.03 UNITS/MIN: 0.2 INJECTION INTRAVENOUS at 00:52

## 2024-02-20 RX ADMIN — DEXTROSE MONOHYDRATE 0.5 MCG/KG/MIN: 50 INJECTION, SOLUTION INTRAVENOUS at 13:24

## 2024-02-20 RX ADMIN — Medication 0.5 MCG/KG/MIN: at 06:24

## 2024-02-20 RX ADMIN — HEPARIN SODIUM AND DEXTROSE 1600 UNITS/HR: 10000; 5 INJECTION INTRAVENOUS at 06:24

## 2024-02-20 RX ADMIN — HEPARIN SODIUM AND DEXTROSE 1600 UNITS/HR: 10000; 5 INJECTION INTRAVENOUS at 21:39

## 2024-02-20 RX ADMIN — VASOPRESSIN 0.03 UNITS/MIN: 0.2 INJECTION INTRAVENOUS at 11:56

## 2024-02-20 RX ADMIN — Medication 0.5 MCG/KG/MIN: at 03:51

## 2024-02-20 RX ADMIN — PIPERACILLIN SODIUM AND TAZOBACTAM SODIUM 2.25 G: 2; .25 INJECTION, SOLUTION INTRAVENOUS at 08:18

## 2024-02-20 RX ADMIN — POLYETHYLENE GLYCOL 3350 17 G: 17 POWDER, FOR SOLUTION ORAL at 08:18

## 2024-02-20 ASSESSMENT — PAIN SCALES - GENERAL
PAINLEVEL_OUTOF10: 0 - NO PAIN

## 2024-02-20 ASSESSMENT — PAIN - FUNCTIONAL ASSESSMENT
PAIN_FUNCTIONAL_ASSESSMENT: 0-10

## 2024-02-20 NOTE — SIGNIFICANT EVENT
Nephrology Care Plan    Mr Tesfaye Milton is a 78 y.o. male with PMH of CAD s/p multiple stents with known PCI to LAD and LCx (2011), Afib failed multiple DCCV, ablation and antiarrythmic therapies on Xarelto and rate control strategy, HFrEF (20-25% LVEF), mod AS, HTN, prostate cancer s/p brachytherapy (2022) who presents as a transfer from [facility], operated by [facility] in cardiogenic shock. Nephrology is consulted for worsening JEFF in setting of oligo-anuria.    Patient presented on 2/13 to  having been referred by his primary cardiologist for SOB and hypotension.He was admitted at  due to cardiogenic shock and it was c/b JEFF followed by resolution in 2-3 days. He underwent left heart cath on 2/19 showing multivessel disease. Meanwhile he got treated for UTI with Zosyn for 2-3 days. Labs on 2/19 AM show worsening renal function, oliguria and ongoing shock requiring up-titration of pressors with dobutamine to 7.5 and norepi to 0.16. He has been transferred to [facility] for further management of his cardiogenic shock. His bl cret is 1. He is currently on 3 pressors, IABP, on 3-4L oxygen, oriented and conversational. He is not making urine since 6 pm (the time he arrived at ). There is no lopez catheter placed. Current CVP is 5, PCWP 17, CI 2. Labs show Cr of 2.8, K 5.1, Na 134, lactate 1.8. CXR shows Interval worsening in pulmonary interstitial edema. Etiology likely 2/2 CRS-cardiogenic shock/SHELLIE/Zosyn use may be considered however less likely.     PLAN:  - please keep foleys for 24-48 hrs for accurate Is and Os.  - There is no urgent indication to start RRT yet however it may be needed in next 12-24 hrs  - Consent taken online in case of urgent RRT. Family was sitting in room.   - BMP BID  - will follow    Case discussed with Dr. Mireya Marrero MD  Nephrology Fellow   Daytime / Weekend Renal Pager [phone]  After 7 pm Emergencies [phone] Pager [phone]

## 2024-02-20 NOTE — NURSING NOTE
Map goal of 60 and/or greater per Dr. Salazar and Ingris, CICU team. Pt now on 0.45 mcg/kg/min of levo. Vaso gtt weaned off per Dr. Post during rounds.

## 2024-02-20 NOTE — CARE PLAN
Problem: Skin  Goal: Decreased wound size/increased tissue granulation at next dressing change  Outcome: Progressing  Flowsheets (Taken 2/19/2024 2258)  Decreased wound size/increased tissue granulation at next dressing change:   Promote sleep for wound healing   Utilize specialty bed per algorithm   Protective dressings over bony prominences  Goal: Participates in plan/prevention/treatment measures  Outcome: Progressing  Flowsheets (Taken 2/19/2024 2258)  Participates in plan/prevention/treatment measures:   Discuss with provider PT/OT consult   Elevate heels   Increase activity/out of bed for meals  Goal: Prevent/manage excess moisture  Outcome: Progressing  Flowsheets (Taken 2/19/2024 2258)  Prevent/manage excess moisture:   Cleanse incontinence/protect with barrier cream   Moisturize dry skin   Use wicking fabric (obtain order)   Follow provider orders for dressing changes   Monitor for/manage infection if present  Goal: Prevent/minimize sheer/friction injuries  Outcome: Progressing  Flowsheets (Taken 2/19/2024 2258)  Prevent/minimize sheer/friction injuries:   Complete micro-shifts as needed if patient unable. Adjust patient position to relieve pressure points, not a full turn   HOB 30 degrees or less   Increase activity/out of bed for meals   Turn/reposition every 2 hours/use positioning/transfer devices   Use pull sheet  Goal: Promote/optimize nutrition  Outcome: Progressing  Flowsheets (Taken 2/19/2024 2258)  Promote/optimize nutrition:   Assist with feeding   Discuss with provider if NPO > 2 days   Offer water/supplements/favorite foods   Consume > 50% meals/supplements   Monitor/record intake including meals   Reassess MST if dietician not consulted  Goal: Promote skin healing  Outcome: Progressing  Flowsheets (Taken 2/19/2024 2258)  Promote skin healing:   Ensure correct size (line/device) and apply per  instructions   Protective dressings over bony prominences   Rotate device position/do  not position patient on device   Turn/reposition every 2 hours/use positioning/transfer devices   Assess skin/pad under line(s)/device(s)     Problem: Pain  Goal: My pain/discomfort is manageable  Outcome: Progressing  Flowsheets (Taken 2/19/2024 2258)  Resident's pain/discomfort is manageable:   Include resident/family/caregiver in decisions related to pain management   Offer non-pharmacological pain management interventions   Administer pain medication prior to activities that may trigger pain   Identify and avoid pain triggers     Problem: Safety  Goal: Patient will be injury free during hospitalization  Outcome: Progressing  Goal: I will remain free of falls  Outcome: Progressing  Flowsheets (Taken 2/19/2024 2258)  Resident will remain free of falls:   Utilize fall response kit   Apply bed/chair alarms as appropriate   Assist with toileting as orderd   Maintain bed at position as ordered (chair height, low bed)   Accompany resident as ordered (ex. 1:1, stand-by assist, dayroom monitoring, 15 minute checks, line of sight)   Use gait belt for all transfers     Problem: Daily Care  Goal: Daily care needs are met  Outcome: Progressing  Flowsheets (Taken 2/19/2024 2258)  Daily care needs are met:   Assess and monitor ability to perform self care and identify potential discharge needs   Assess skin integrity/risk for skin breakdown   Assist patient with activities of daily living as needed   Encourage independent activity per ability     Problem: Psychosocial Needs  Goal: Demonstrates ability to cope with hospitalization/illness  Outcome: Progressing  Flowsheets (Taken 2/19/2024 2258)  Demonstrates ability to cope with hospitalization/illness:   Encourage verbalization of feelings/concerns/expectations   Provide low-stimulation environment as needed   Assist resident to identify and practice own strengths and abilities   Encourage resident to set and complete small goals for self   Encourage participation in  diversional activities   Reinforce positive adaptation of new coping behaviors   Include resident/family/caregiver in decisions related to psychosocial needs  Goal: Collaborate with me, my family, and caregiver to identify my specific goals  Outcome: Progressing  Flowsheets (Taken 2/19/2024 1233 by Aileen Farrell RN)  Cultural Requests During Hospitalization: n/a  Spiritual Requests During Hospitalization: n/a     Problem: Discharge Barriers  Goal: My discharge needs are met  Outcome: Progressing  Flowsheets (Taken 2/19/2024 9785)  Resident's discharge needs are met:   Identify potential discharge barriers on admission and throughout stay   Involve resident/family/caregiver in discharge planning process   The patient's goals for the shift include      The clinical goals for the shift include pt will maintain CO/CI above 4/2 during this shift

## 2024-02-20 NOTE — CONSULTS
NEPHROLOGY NEW CONSULT NOTE   Tesfaye Milton   78 y.o.    @WT@  MRN/Room: [id number]/16/16-A    Reason for consult: Anuria and JEFF     HPI:  Tesfaye Milton is a 78 y.o. male with PMH of CAD s/p multiple stents with known PCI to LAD and LCx (2011), Afib failed multiple DCCV, ablation and antiarrythmic therapies on Xarelto and rate control strategy, HFrEF (20-25% LVEF), mod AS, HTN, prostate cancer s/p brachytherapy (2022) who presents as a transfer from [facility] in cardiogenic shock. Nephrology consulted for anuria and JEFF.   Patient presented on 2/13 to  having been referred by his primary cardiologist for SOB and hypotension. He had also been experiencing LE edema and cough with yellow sputum for a couple of days. On admission he was found to be hypotensive with BP 89/44, HR 97 RR 49 satting at 93%. Labs showed WBC 12.1. Hgb 14.7, plt 210, Cr 2.9, AST/ALT 1488/1073, Tbil 1.9, ABG 7.35/31/119, bicarb 17 with AG 19, lactate and serum lactate was 5.8, troponin 1,236. EKG showed afib, SHYAM III, ST depressions I, II, aVL, V4-V6 and SHYAM avR. He was admitted for cardiogenic shock, started on dobutamine and escalated with addition of norepi. By 2/17, his renal function and liver enzymes improved but continued on norepi due to ongoing shock concerning for septic component, presumed UTI, treated with Zosyn. He underwent left heart cath on 2/19 showing multivessel disease. Repeat labs 2/19 show worsening renal function, oliguria and ongoing shock requiring up-titration of pressors with dobutamine to 7.5 and norepi to 0.16. and was transferred to [facility] for further management of his cardiogenic shock.   [place] on presentation: CVP (not readable/clotted), PA 36/20(16) and CO/CI 6.3/2.6. MVO2 72%, SVR CNR. Repeat swan: CVP 6, PA: 66/35 (48), cannot wedge, CO/CI: 3.6/1.5 SVR 1399 on vaso 0.02, levo 0.2, dobut 5 MVO2 54%. Started on IABP, on 3-4L oxygen, oriented and conversational. Was not making urine since 2/19 6 pm (the time he  "arrived at ). Recommended lopez catheter placement, BMP BID and close monitoring for CRRT with potential to start within 24h. Patient seen at bedside 2/20 AM and reported that he feels okay, has some pain in lower back from laying down. On 3L NC.     Baseline creatinine is 0.9-1.0  Creatinine trends: 2.9>4.3>2.5>1.6>1.0>2.51 >2.84>3.09  Requiring pressor support during hospital course with vaso 0.02, levo 0.2, dobut 5   Urine output was 5cc last 24hrs, baldder scan 2/19 empty   Recent contrast studies on 2/19  Recent nephrotoxic medication use: Zosyn (2/18 - )     Fever, skin rash or eosinophilia on CBC: None  Recent nausea, vomiting, loose stools, evidence of acute blood loss, joint pains, URI/viral infection: None  Lightheadedness, dizziness, dry mouth: None  BPH history:   Last PSA: 4.48ng/ml 8/17, 6.4ng/ml 12/19, 6 ng/ml 3/20, 7.7ng/ml 8/20, 7.5ng/ml 12/20, 10.6ng/ml 11/20, 7.5ng/ml 12/20, 8.4ng/ml 6/21, 10.6ng/ml 10/21, 11.1ng/ml 6/22. active PVP TURP 1/19.   Swelling in leg, weight gain/loss, SOB/orthopnea/PND: Denies  Hx of cancers: Prostate CA, Estimated Stage T2. Treatment survelliance S/p Brachytherapy 9/22.   saw dr srinivasan at .       In The ER: BP 95/59   Pulse (!) 120   Temp 35.8 °C (96.4 °F) (Temporal)   Resp 17   Ht 1.905 m (6' 3\")   Wt 111 kg (244 lb 11.4 oz)   SpO2 98%   BMI 30.59 kg/m²      Past Medical History:   Diagnosis Date    Anxiety disorder, unspecified     Anxiety    Atrial fibrillation (CMS/HCC)     CAD (coronary artery disease)     CHF (congestive heart failure) (CMS/HCC)     High cholesterol     Hypertension     Long term (current) use of anticoagulants     Anticoagulant long-term use    Other conditions influencing health status     Stent-related Vessel Entrapment    Personal history of other diseases of male genital organs     History of benign prostatic hypertrophy    Personal history of other diseases of the circulatory system 07/26/2013    History of hypertension    " Personal history of other endocrine, nutritional and metabolic disease 07/26/2013    History of hypercholesterolemia    Prostate disorder       Past Surgical History:   Procedure Laterality Date    APPENDECTOMY  04/08/2013    Appendectomy    CARDIAC CATHETERIZATION N/A 2/19/2024    Procedure: Left Heart Cath;  Surgeon: Wayne Armijo MD;  Location: [facility] Cardiac Cath Lab;  Service: Cardiovascular;  Laterality: N/A;    CARDIAC CATHETERIZATION N/A 2/19/2024    Procedure: IABP Insertion;  Surgeon: Wilbert Herrera MD;  Location: [place] [address] Cardiac Cath Lab;  Service: Cardiovascular;  Laterality: N/A;    KNEE ARTHROSCOPY W/ DEBRIDEMENT  04/08/2013    Arthroscopy Knee Right    OTHER SURGICAL HISTORY  04/08/2013    Atrial Cardioversion    UMBILICAL HERNIA REPAIR  04/08/2013    Umbilical Hernia Repair      Family History   Problem Relation Name Age of Onset    Stroke Mother      Dementia Mother      Stroke Father       Social History     Socioeconomic History    Marital status:      Spouse name: Not on file    Number of children: Not on file    Years of education: Not on file    Highest education level: Not on file   Occupational History    Occupation: Retired dentist   Tobacco Use    Smoking status: Former     Packs/day: .25     Types: Cigarettes    Smokeless tobacco: Never   Substance and Sexual Activity    Alcohol use: Not Currently     Alcohol/week: 7.0 standard drinks of alcohol     Types: 7 Standard drinks or equivalent per week    Drug use: Never    Sexual activity: Defer   Other Topics Concern    Not on file   Social History Narrative    Not on file     Social Determinants of Health     Financial Resource Strain: Low Risk  (2/19/2024)    Overall Financial Resource Strain (CARDIA)     Difficulty of Paying Living Expenses: Not hard at all   Food Insecurity: Not on file   Transportation Needs: No Transportation Needs (2/19/2024)    PRAPARE - Transportation     Lack of Transportation (Medical): No      Lack of Transportation (Non-Medical): No   Physical Activity: Not on file   Stress: Not on file   Social Connections: Not on file   Intimate Partner Violence: Not on file   Housing Stability: Low Risk  (2/19/2024)    Housing Stability Vital Sign     Unable to Pay for Housing in the Last Year: No     Number of Places Lived in the Last Year: 1     Unstable Housing in the Last Year: No       No Known Allergies     Medications Prior to Admission   Medication Sig Dispense Refill Last Dose    clopidogrel (Plavix) 75 mg tablet Take 1 tablet (75 mg) by mouth once daily.       metoprolol succinate XL (Toprol-XL) 100 mg 24 hr tablet Take 1 tablet (100 mg) by mouth once daily. Do not crush or chew. 90 tablet 0     metoprolol tartrate (Lopressor) 50 mg tablet Take 1 tablet by mouth 3 times a day. For 90 days 90 tablet 5     mirabegron (Myrbetriq) 25 mg tablet extended release 24 hr 24 hr tablet        nitroglycerin (Nitrostat) 0.4 mg SL tablet Place under the tongue. may be repeated every 5 minutes up to a maximum of 3 doses in a 15 minute period       rosuvastatin (Crestor) 10 mg tablet Take 1 tablet (10 mg) by mouth once daily. 90 tablet 1     sacubitriL-valsartan (Entresto)  mg tablet Take 1 tablet by mouth 2 times a day.       tamsulosin (Flomax) 0.4 mg 24 hr capsule        Xarelto 20 mg tablet Take 1 tablet (20 mg) by mouth once daily.           Meds:   aspirin, 81 mg, Daily  clopidogrel, 75 mg, Daily  fentaNYL PF, ,   insulin lispro, 0-10 Units, TID with meals  pantoprazole, 40 mg, Daily before breakfast  piperacillin-tazobactam, 2.25 g, q6h  polyethylene glycol, 17 g, Daily  sennosides-docusate sodium, 2 tablet, BID      heparin, Last Rate: 1,600 Units/hr (02/20/24 0800)  milrinone, Last Rate: Stopped (02/20/24 0810)  norepinephrine, Last Rate: 0.4 mcg/kg/min (02/20/24 0900)  phenylephrine, Last Rate: Stopped (02/20/24 0406)  vasopressin, Last Rate: 0.03 Units/min (02/20/24 0800)      acetaminophen, 650 mg, q6h  PRN  dextrose 10 % in water (D10W), 0.3 g/kg/hr, Once PRN  dextrose, 25 g, q15 min PRN  fentaNYL PF, ,   glucagon, 1 mg, q15 min PRN  heparin, 3,000-6,000 Units, q4h PRN  oxyCODONE, 2.5 mg, q6h PRN  oxygen, , Continuous PRN - O2/gases        Vitals:    02/20/24 0900   BP:    Pulse: (!) 120   Resp: 17   Temp:    SpO2: 98%        02/18 1900 - 02/20 0659  In: 2437.6 [I.V.:1937.6]  Out: 5    Weight change:        Physical Exam  Constitutional:       Appearance: Normal appearance.   Eyes:      Extraocular Movements: Extraocular movements intact.      Conjunctiva/sclera: Conjunctivae normal.   Neck:      Comments: [place] in left IJ  Cardiovascular:      Rate and Rhythm: Normal rate and regular rhythm.   Pulmonary:      Effort: Pulmonary effort is normal.      Breath sounds: Normal breath sounds.   Abdominal:      General: Abdomen is flat. There is no distension.      Palpations: Abdomen is soft.      Tenderness: There is no abdominal tenderness.   Musculoskeletal:      Right lower leg: No edema.      Left lower leg: No edema.   Neurological:      Mental Status: He is alert and oriented to person, place, and time.   Psychiatric:         Mood and Affect: Mood normal.         Behavior: Behavior normal.        Blood Labs:  Results for orders placed or performed during the hospital encounter of 02/19/24 (from the past 24 hour(s))   POCT GLUCOSE   Result Value Ref Range    POCT Glucose 288 (H) 74 - 99 mg/dL   CBC and Auto Differential   Result Value Ref Range    WBC 16.4 (H) 4.4 - 11.3 x10*3/uL    nRBC 0.0 0.0 - 0.0 /100 WBCs    RBC 3.86 (L) 4.50 - 5.90 x10*6/uL    Hemoglobin 13.4 (L) 13.5 - 17.5 g/dL    Hematocrit 39.5 (L) 41.0 - 52.0 %     (H) 80 - 100 fL    MCH 34.7 (H) 26.0 - 34.0 pg    MCHC 33.9 32.0 - 36.0 g/dL    RDW 14.5 11.5 - 14.5 %    Platelets 221 150 - 450 x10*3/uL    Neutrophils % 93.9 40.0 - 80.0 %    Immature Granulocytes %, Automated 0.6 0.0 - 0.9 %    Lymphocytes % 1.3 13.0 - 44.0 %    Monocytes % 4.1 2.0  - 10.0 %    Eosinophils % 0.0 0.0 - 6.0 %    Basophils % 0.1 0.0 - 2.0 %    Neutrophils Absolute 15.40 (H) 1.60 - 5.50 x10*3/uL    Immature Granulocytes Absolute, Automated 0.10 0.00 - 0.50 x10*3/uL    Lymphocytes Absolute 0.22 (L) 0.80 - 3.00 x10*3/uL    Monocytes Absolute 0.68 0.05 - 0.80 x10*3/uL    Eosinophils Absolute 0.00 0.00 - 0.40 x10*3/uL    Basophils Absolute 0.02 0.00 - 0.10 x10*3/uL   Comprehensive metabolic panel   Result Value Ref Range    Glucose 306 (H) 74 - 99 mg/dL    Sodium 132 (L) 136 - 145 mmol/L    Potassium 4.7 3.5 - 5.3 mmol/L    Chloride 95 (L) 98 - 107 mmol/L    Bicarbonate 25 21 - 32 mmol/L    Anion Gap 17 10 - 20 mmol/L    Urea Nitrogen 57 (H) 6 - 23 mg/dL    Creatinine 2.51 (H) 0.50 - 1.30 mg/dL    eGFR 26 (L) >60 mL/min/1.73m*2    Calcium 8.2 (L) 8.6 - 10.6 mg/dL    Albumin 2.9 (L) 3.4 - 5.0 g/dL    Alkaline Phosphatase 83 33 - 136 U/L    Total Protein 5.3 (L) 6.4 - 8.2 g/dL     (H) 9 - 39 U/L    Bilirubin, Total 1.1 0.0 - 1.2 mg/dL     (H) 10 - 52 U/L   Magnesium   Result Value Ref Range    Magnesium 2.84 (H) 1.60 - 2.40 mg/dL   Coagulation Screen   Result Value Ref Range    Protime 11.6 9.8 - 12.8 seconds    INR 1.0 0.9 - 1.1    aPTT 34 27 - 38 seconds   Lactate   Result Value Ref Range    Lactate 1.8 0.4 - 2.0 mmol/L   Troponin I, High Sensitivity   Result Value Ref Range    Troponin I, High Sensitivity 3,463 (HH) 0 - 53 ng/L   B-type natriuretic peptide   Result Value Ref Range    BNP 1,905 (H) 0 - 99 pg/mL   Type and screen   Result Value Ref Range    ABO TYPE B     Rh TYPE NEG     ANTIBODY SCREEN NEG    Heparin Assay   Result Value Ref Range    Heparin Unfractionated 0.2 See Comment Below for Therapeutic Ranges IU/mL   BLOOD GAS MIXED VENOUS FULL PANEL   Result Value Ref Range    POCT pH, Mixed 7.34 7.33 - 7.43 pH    POCT pCO2, Mixed 49 41 - 51 mm Hg    POCT pO2, Mixed 47 (H) 35 - 45 mm Hg    POCT SO2, Mixed 72 45 - 75 %    POCT Oxy Hemoglobin, Mixed 69.7 45.0 -  75.0 %    POCT Hematocrit Calculated, Mixed 42.0 41.0 - 52.0 %    POCT Sodium, Mixed 131 (L) 136 - 145 mmol/L    POCT Potassium, Mixed 4.8 3.5 - 5.3 mmol/L    POCT Chloride, Mixed 97 (L) 98 - 107 mmol/L    POCT Ionized Calcium, Mixed 1.09 (L) 1.10 - 1.33 mmol/L    POCT Glucose, Mixed 179 (H) 74 - 99 mg/dL    POCT Lactate, Mixed 2.8 (H) 0.4 - 2.0 mmol/L    POCT Base Excess, Mixed -0.1 -2.0 - 3.0 mmol/L    POCT HCO3 Calculated, Mixed 26.4 (H) 22.0 - 26.0 mmol/L    POCT Hemoglobin, Mixed 14.1 13.5 - 17.5 g/dL    POCT Anion Gap, Mixed 12 10 - 25 mmo/L    Patient Temperature 37.0 degrees Celsius    FiO2 32 %   Hemoglobin A1c   Result Value Ref Range    Hemoglobin A1C 5.4 see below %    Estimated Average Glucose 108 Not Established mg/dL   BLOOD GAS ARTERIAL FULL PANEL   Result Value Ref Range    POCT pH, Arterial 7.37 (L) 7.38 - 7.42 pH    POCT pCO2, Arterial 42 38 - 42 mm Hg    POCT pO2, Arterial 117 (H) 85 - 95 mm Hg    POCT SO2, Arterial 99 94 - 100 %    POCT Oxy Hemoglobin, Arterial 97.5 94.0 - 98.0 %    POCT Hematocrit Calculated, Arterial 42.0 41.0 - 52.0 %    POCT Sodium, Arterial 132 (L) 136 - 145 mmol/L    POCT Potassium, Arterial 4.7 3.5 - 5.3 mmol/L    POCT Chloride, Arterial 98 98 - 107 mmol/L    POCT Ionized Calcium, Arterial 1.08 (L) 1.10 - 1.33 mmol/L    POCT Glucose, Arterial 179 (H) 74 - 99 mg/dL    POCT Lactate, Arterial 2.7 (H) 0.4 - 2.0 mmol/L    POCT Base Excess, Arterial -1.1 -2.0 - 3.0 mmol/L    POCT HCO3 Calculated, Arterial 24.3 22.0 - 26.0 mmol/L    POCT Hemoglobin, Arterial 14.1 13.5 - 17.5 g/dL    POCT Anion Gap, Arterial 14 10 - 25 mmo/L    Patient Temperature 37.0 degrees Celsius    FiO2 33 %   POCT GLUCOSE   Result Value Ref Range    POCT Glucose 159 (H) 74 - 99 mg/dL   BLOOD GAS MIXED VENOUS FULL PANEL   Result Value Ref Range    POCT pH, Mixed 7.37 7.33 - 7.43 pH    POCT pCO2, Mixed 45 41 - 51 mm Hg    POCT pO2, Mixed 37 35 - 45 mm Hg    POCT SO2, Mixed 52 45 - 75 %    POCT Oxy  Hemoglobin, Mixed 51.5 45.0 - 75.0 %    POCT Hematocrit Calculated, Mixed 41.0 41.0 - 52.0 %    POCT Sodium, Mixed 131 (L) 136 - 145 mmol/L    POCT Potassium, Mixed 5.1 3.5 - 5.3 mmol/L    POCT Chloride, Mixed 97 (L) 98 - 107 mmol/L    POCT Ionized Calcium, Mixed 1.05 (L) 1.10 - 1.33 mmol/L    POCT Glucose, Mixed 182 (H) 74 - 99 mg/dL    POCT Lactate, Mixed 2.4 (H) 0.4 - 2.0 mmol/L    POCT Base Excess, Mixed 0.3 -2.0 - 3.0 mmol/L    POCT HCO3 Calculated, Mixed 26.0 22.0 - 26.0 mmol/L    POCT Hemoglobin, Mixed 13.7 13.5 - 17.5 g/dL    POCT Anion Gap, Mixed 13 10 - 25 mmo/L    Patient Temperature 37.0 degrees Celsius    FiO2 34 %   Comprehensive metabolic panel   Result Value Ref Range    Glucose 175 (H) 74 - 99 mg/dL    Sodium 134 (L) 136 - 145 mmol/L    Potassium 5.1 3.5 - 5.3 mmol/L    Chloride 97 (L) 98 - 107 mmol/L    Bicarbonate 25 21 - 32 mmol/L    Anion Gap 17 10 - 20 mmol/L    Urea Nitrogen 61 (H) 6 - 23 mg/dL    Creatinine 2.84 (H) 0.50 - 1.30 mg/dL    eGFR 22 (L) >60 mL/min/1.73m*2    Calcium 8.4 (L) 8.6 - 10.6 mg/dL    Albumin 3.0 (L) 3.4 - 5.0 g/dL    Alkaline Phosphatase 79 33 - 136 U/L    Total Protein 5.3 (L) 6.4 - 8.2 g/dL     (H) 9 - 39 U/L    Bilirubin, Total 1.0 0.0 - 1.2 mg/dL     (H) 10 - 52 U/L   Magnesium   Result Value Ref Range    Magnesium 2.86 (H) 1.60 - 2.40 mg/dL   Heparin Assay, UFH   Result Value Ref Range    Heparin Unfractionated 0.6 See Comment Below for Therapeutic Ranges IU/mL   BLOOD GAS MIXED VENOUS FULL PANEL   Result Value Ref Range    POCT pH, Mixed 7.38 7.33 - 7.43 pH    POCT pCO2, Mixed 43 41 - 51 mm Hg    POCT pO2, Mixed 39 35 - 45 mm Hg    POCT SO2, Mixed 57 45 - 75 %    POCT Oxy Hemoglobin, Mixed 55.6 45.0 - 75.0 %    POCT Hematocrit Calculated, Mixed 41.0 41.0 - 52.0 %    POCT Sodium, Mixed 129 (L) 136 - 145 mmol/L    POCT Potassium, Mixed 5.1 3.5 - 5.3 mmol/L    POCT Chloride, Mixed 97 (L) 98 - 107 mmol/L    POCT Ionized Calcium, Mixed 1.08 (L) 1.10 - 1.33  mmol/L    POCT Glucose, Mixed 188 (H) 74 - 99 mg/dL    POCT Lactate, Mixed 2.4 (H) 0.4 - 2.0 mmol/L    POCT Base Excess, Mixed 0.0 -2.0 - 3.0 mmol/L    POCT HCO3 Calculated, Mixed 25.4 22.0 - 26.0 mmol/L    POCT Hemoglobin, Mixed 13.5 13.5 - 17.5 g/dL    POCT Anion Gap, Mixed 12 10 - 25 mmo/L    Patient Temperature 37.0 degrees Celsius    FiO2 35 %   ACTIVATED CLOTTING TIME LOW   Result Value Ref Range    POCT Activated Clotting Time Low Range 152 89 - 169 sec   Blood Gas Venous Full Panel   Result Value Ref Range    POCT pH, Venous 7.37 7.33 - 7.43 pH    POCT pCO2, Venous 46 41 - 51 mm Hg    POCT pO2, Venous 42 35 - 45 mm Hg    POCT SO2, Venous 64 45 - 75 %    POCT Oxy Hemoglobin, Venous 62.3 45.0 - 75.0 %    POCT Hematocrit Calculated, Venous 40.0 (L) 41.0 - 52.0 %    POCT Sodium, Venous 130 (L) 136 - 145 mmol/L    POCT Potassium, Venous 5.0 3.5 - 5.3 mmol/L    POCT Chloride, Venous 96 (L) 98 - 107 mmol/L    POCT Ionized Calicum, Venous 1.07 (L) 1.10 - 1.33 mmol/L    POCT Glucose, Venous 186 (H) 74 - 99 mg/dL    POCT Lactate, Venous 2.0 0.4 - 2.0 mmol/L    POCT Base Excess, Venous 0.8 -2.0 - 3.0 mmol/L    POCT HCO3 Calculated, Venous 26.6 (H) 22.0 - 26.0 mmol/L    POCT Hemoglobin, Venous 13.4 (L) 13.5 - 17.5 g/dL    POCT Anion Gap, Venous 12.0 10.0 - 25.0 mmol/L    Patient Temperature 37.0 degrees Celsius    FiO2 21 %   Blood Gas Lactic Acid, Venous   Result Value Ref Range    POCT Lactate, Venous 2.0 0.4 - 2.0 mmol/L   Blood Culture    Specimen: Peripheral Venipuncture; Blood culture   Result Value Ref Range    Blood Culture Loaded on Instrument - Culture in progress    Blood Culture    Specimen: Peripheral Venipuncture; Blood culture   Result Value Ref Range    Blood Culture Loaded on Instrument - Culture in progress    Coagulation Screen   Result Value Ref Range    Protime 13.5 (H) 9.8 - 12.8 seconds    INR 1.2 (H) 0.9 - 1.1    aPTT >200 (HH) 27 - 38 seconds   BLOOD GAS MIXED VENOUS FULL PANEL   Result Value  Ref Range    POCT pH, Mixed 7.36 7.33 - 7.43 pH    POCT pCO2, Mixed 47 41 - 51 mm Hg    POCT pO2, Mixed 45 35 - 45 mm Hg    POCT SO2, Mixed 71 45 - 75 %    POCT Oxy Hemoglobin, Mixed 68.8 45.0 - 75.0 %    POCT Hematocrit Calculated, Mixed 40.0 (L) 41.0 - 52.0 %    POCT Sodium, Mixed 129 (L) 136 - 145 mmol/L    POCT Potassium, Mixed 5.0 3.5 - 5.3 mmol/L    POCT Chloride, Mixed 95 (L) 98 - 107 mmol/L    POCT Ionized Calcium, Mixed 1.10 1.10 - 1.33 mmol/L    POCT Glucose, Mixed 200 (H) 74 - 99 mg/dL    POCT Lactate, Mixed 2.0 0.4 - 2.0 mmol/L    POCT Base Excess, Mixed 0.6 -2.0 - 3.0 mmol/L    POCT HCO3 Calculated, Mixed 26.6 (H) 22.0 - 26.0 mmol/L    POCT Hemoglobin, Mixed 13.3 (L) 13.5 - 17.5 g/dL    POCT Anion Gap, Mixed 12 10 - 25 mmo/L    Patient Temperature 37.0 degrees Celsius    FiO2 21 %   Heparin Assay, UFH   Result Value Ref Range    Heparin Unfractionated 2.0 (HH) See Comment Below for Therapeutic Ranges IU/mL   CBC and Auto Differential   Result Value Ref Range    WBC 19.3 (H) 4.4 - 11.3 x10*3/uL    nRBC 0.0 0.0 - 0.0 /100 WBCs    RBC 3.73 (L) 4.50 - 5.90 x10*6/uL    Hemoglobin 13.1 (L) 13.5 - 17.5 g/dL    Hematocrit 37.6 (L) 41.0 - 52.0 %     (H) 80 - 100 fL    MCH 35.1 (H) 26.0 - 34.0 pg    MCHC 34.8 32.0 - 36.0 g/dL    RDW 14.0 11.5 - 14.5 %    Platelets 217 150 - 450 x10*3/uL    Neutrophils % 87.5 40.0 - 80.0 %    Immature Granulocytes %, Automated 0.8 0.0 - 0.9 %    Lymphocytes % 2.3 13.0 - 44.0 %    Monocytes % 9.3 2.0 - 10.0 %    Eosinophils % 0.0 0.0 - 6.0 %    Basophils % 0.1 0.0 - 2.0 %    Neutrophils Absolute 16.87 (H) 1.60 - 5.50 x10*3/uL    Immature Granulocytes Absolute, Automated 0.15 0.00 - 0.50 x10*3/uL    Lymphocytes Absolute 0.44 (L) 0.80 - 3.00 x10*3/uL    Monocytes Absolute 1.80 (H) 0.05 - 0.80 x10*3/uL    Eosinophils Absolute 0.00 0.00 - 0.40 x10*3/uL    Basophils Absolute 0.02 0.00 - 0.10 x10*3/uL   Comprehensive Metabolic Panel   Result Value Ref Range    Glucose 201 (H) 74 -  99 mg/dL    Sodium 133 (L) 136 - 145 mmol/L    Potassium 4.9 3.5 - 5.3 mmol/L    Chloride 95 (L) 98 - 107 mmol/L    Bicarbonate 25 21 - 32 mmol/L    Anion Gap 18 10 - 20 mmol/L    Urea Nitrogen 70 (H) 6 - 23 mg/dL    Creatinine 3.09 (H) 0.50 - 1.30 mg/dL    eGFR 20 (L) >60 mL/min/1.73m*2    Calcium 8.5 (L) 8.6 - 10.6 mg/dL    Albumin 3.2 (L) 3.4 - 5.0 g/dL    Alkaline Phosphatase 71 33 - 136 U/L    Total Protein 5.9 (L) 6.4 - 8.2 g/dL     (H) 9 - 39 U/L    Bilirubin, Total 1.1 0.0 - 1.2 mg/dL     (H) 10 - 52 U/L   Magnesium   Result Value Ref Range    Magnesium 2.73 (H) 1.60 - 2.40 mg/dL   Heparin Assay, UFH   Result Value Ref Range    Heparin Unfractionated 1.2 (HH) See Comment Below for Therapeutic Ranges IU/mL   BLOOD GAS MIXED VENOUS FULL PANEL   Result Value Ref Range    POCT pH, Mixed 7.34 7.33 - 7.43 pH    POCT pCO2, Mixed 51 41 - 51 mm Hg    POCT pO2, Mixed 50 (H) 35 - 45 mm Hg    POCT SO2, Mixed 76 (H) 45 - 75 %    POCT Oxy Hemoglobin, Mixed 74.5 45.0 - 75.0 %    POCT Hematocrit Calculated, Mixed 38.0 (L) 41.0 - 52.0 %    POCT Sodium, Mixed 128 (L) 136 - 145 mmol/L    POCT Potassium, Mixed 4.9 3.5 - 5.3 mmol/L    POCT Chloride, Mixed 95 (L) 98 - 107 mmol/L    POCT Ionized Calcium, Mixed 1.10 1.10 - 1.33 mmol/L    POCT Glucose, Mixed 190 (H) 74 - 99 mg/dL    POCT Lactate, Mixed 1.5 0.4 - 2.0 mmol/L    POCT Base Excess, Mixed 0.9 -2.0 - 3.0 mmol/L    POCT HCO3 Calculated, Mixed 27.5 (H) 22.0 - 26.0 mmol/L    POCT Hemoglobin, Mixed 12.8 (L) 13.5 - 17.5 g/dL    POCT Anion Gap, Mixed 10 10 - 25 mmo/L    Patient Temperature 37.0 degrees Celsius    FiO2 21 %   Heparin Assay, UFH   Result Value Ref Range    Heparin Unfractionated 0.3 See Comment Below for Therapeutic Ranges IU/mL   BLOOD GAS MIXED VENOUS FULL PANEL   Result Value Ref Range    POCT pH, Mixed 7.33 7.33 - 7.43 pH    POCT pCO2, Mixed 52 (H) 41 - 51 mm Hg    POCT pO2, Mixed 47 (H) 35 - 45 mm Hg    POCT SO2, Mixed 74 45 - 75 %    POCT Oxy  Hemoglobin, Mixed 72.3 45.0 - 75.0 %    POCT Hematocrit Calculated, Mixed 38.0 (L) 41.0 - 52.0 %    POCT Sodium, Mixed 128 (L) 136 - 145 mmol/L    POCT Potassium, Mixed 4.9 3.5 - 5.3 mmol/L    POCT Chloride, Mixed 94 (L) 98 - 107 mmol/L    POCT Ionized Calcium, Mixed 1.12 1.10 - 1.33 mmol/L    POCT Glucose, Mixed 170 (H) 74 - 99 mg/dL    POCT Lactate, Mixed 1.3 0.4 - 2.0 mmol/L    POCT Base Excess, Mixed 0.6 -2.0 - 3.0 mmol/L    POCT HCO3 Calculated, Mixed 27.4 (H) 22.0 - 26.0 mmol/L    POCT Hemoglobin, Mixed 12.7 (L) 13.5 - 17.5 g/dL    POCT Anion Gap, Mixed 12 10 - 25 mmo/L    Patient Temperature 37.0 degrees Celsius    FiO2 21 %   POCT GLUCOSE   Result Value Ref Range    POCT Glucose 154 (H) 74 - 99 mg/dL         ASSESSMENT:  Tesfaye Milton is a 78 y.o. male with PMH of CAD s/p multiple stents with known PCI to LAD and LCx (2011), Afib failed multiple DCCV, ablation and antiarrythmic therapies on Xarelto and rate control strategy, HFrEF (20-25% LVEF), mod AS, HTN, prostate cancer s/p brachytherapy (2022) who presents as a transfer from [facility] in cardiogenic shock. Nephrology consulted for anuria and JEFF. Was not making urine since 2/19 6 pm (the time he arrived at ). Recommended lopez catheter placement, BMP BID and close monitoring for CRRT with potential to start within 24h. Etiology of worsening kidney function is multifactorial; CRS, cardiogenic shock, SHELLIE, Zosyn usage. Patient currently with hemodynamic monitoring with RHC and on norepinephrine, vaso stopped 2/20 AM. No signs of significant volume overload on exam. Currently on 3L NC. Recommend to reduce intake of fluids as much as possible, prefer using isotonic solutions if giving fluids, No urgent indications for RRT at this time, but will continue close monitoring of hemodynamics and worsening O2 requirement which may necessitate earlier RRT.     - JEFF - anuric (?) Stage III, bladder scan showing no volume currently   - Baseline creatinine:  0.9-1.0  - Etiology: CRS, cardiogenic shock, SHELLIE, Zosyn   - Electrolytes (Na, K, Ca, Phos): stable  - On norepinephrine 0.5   - Creatinine trends: 2.9>4.3>2.5>1.6>1.0>2.51 >2.84>3.09  - Recent nephrotoxic medication use: Zosyn (2/18 - )     Recommendations:  - There is no urgent indication to start RRT yet, however may be needed pending volume status/respiratory status   - Please limit infusion of fluids as much as possible, recommend isotonic solutions if giving fluids   - Agree with continuing hemodynamic monitoring; if patient becomes more hypervolemic and has increased O2 requirements, will need dialysis more urgently   - Agree with bladder scan [id number], monitor I/Os  - BMP BID  - Maintain MAP > 65 for renal perfusion   - Avoid nephrotoxins, contrast if possible  - strict Is/Os  - Renal dosing for medications for latest eGFR, follow medication trough as appropriate    Thank you for this consult. Nephrology will continue to follow this patient. Discussed with attending nephrologist,  Dr. Gomes.    Belem Pat MD  24 hour Renal Pager - [phone]

## 2024-02-20 NOTE — PROGRESS NOTES
Subjective Data:  Patient seen this AM, opens eyes to verbal stimulus and nods head to question. Very hard of hearing, sleeping through most of exam.     Overnight Events:    IABP placed in cath lab 2/19 evening      Objective Data:  Last Recorded Vitals:  Vitals:    02/20/24 0434 02/20/24 0500 02/20/24 0600 02/20/24 0700   BP:       BP Location:       Patient Position:       Pulse: 102 108 109 107   Resp: 25 18 12 15   Temp:       TempSrc:       SpO2: 99% 100% 96%    Weight:   111 kg (244 lb 11.4 oz)    Height:           Last Labs:  CBC - 2/20/2024: 12:39 AM  19.3 13.1 217    37.6      CMP - 2/20/2024: 12:39 AM  8.5 5.9 110 --- 1.1   _ 3.2 322 71      PTT - 2/19/2024: 10:26 PM  1.2   13.5 >200     TROPHS   Date/Time Value Ref Range Status   02/19/2024 12:02 PM 3,463 0 - 53 ng/L Final     BNP   Date/Time Value Ref Range Status   02/19/2024 12:02 PM 1,905 0 - 99 pg/mL Final     HGBA1C   Date/Time Value Ref Range Status   02/19/2024 12:02 PM 5.4 see below % Final   07/09/2021 09:12 AM 5.2 % Final     Comment:          Diagnosis of Diabetes-Adults   Non-Diabetic: < or = 5.6%   Increased risk for developing diabetes: 5.7-6.4%   Diagnostic of diabetes: > or = 6.5%  .       Monitoring of Diabetes                Age (y)     Therapeutic Goal (%)   Adults:          >18           <7.0   Pediatrics:    13-18           <7.5                   7-12           <8.0                   0- 6            7.5-8.5   American Diabetes Association. Diabetes Care 33(S1), Jan 2010.       LDLCALC   Date/Time Value Ref Range Status   12/15/2023 10:28 AM 82 <=99 mg/dL Final     Comment:                                 Near   Borderline      AGE      Desirable  Optimal    High     High     Very High     0-19 Y     0 - 109     ---    110-129   >/= 130     ----    20-24 Y     0 - 119     ---    120-159   >/= 160     ----      >24 Y     0 -  99   100-129  130-159   160-189     >/=190     05/09/2020 04:24  65 - 130 MG/DL Final     VLDL    Date/Time Value Ref Range Status   12/15/2023 10:28 AM 17 0 - 40 mg/dL Final   09/20/2023 09:51 AM 14 0 - 40 mg/dL Final   06/30/2022 09:30 AM 19 0 - 40 mg/dL Final   01/18/2022 12:50 PM 21 0 - 40 mg/dL Final      Last I/O:  I/O last 3 completed shifts:  In: 2437.6 (22 mL/kg) [I.V.:1937.6 (17.5 mL/kg); IV Piggyback:500]  Out: 5 (0 mL/kg) [Blood:5]  Weight: 111 kg     Inpatient Medications:  Scheduled medications   Medication Dose Route Frequency    aspirin  81 mg oral Daily    clopidogrel  75 mg oral Daily    fentaNYL PF        insulin lispro  0-10 Units subcutaneous TID with meals    pantoprazole  40 mg oral Daily before breakfast    piperacillin-tazobactam  2.25 g intravenous q6h    polyethylene glycol  17 g oral Daily    sennosides-docusate sodium  2 tablet oral BID     PRN medications   Medication    dextrose 10 % in water (D10W)    dextrose    fentaNYL PF    glucagon    heparin    oxygen     Continuous Medications   Medication Dose Last Rate    heparin  0-4,500 Units/hr 1,600 Units/hr (02/20/24 0624)    milrinone  0.1-0.75 mcg/kg/min 0.125 mcg/kg/min (02/20/24 0614)    norepinephrine  0.01-1 mcg/kg/min 0.5 mcg/kg/min (02/20/24 0624)    phenylephrine  0-9 mcg/kg/min Stopped (02/20/24 0406)    vasopressin  0.01-0.06 Units/min 0.03 Units/min (02/20/24 0614)     Physical Exam  Constitutional:       General: He is sleeping.      Appearance: He is ill-appearing.   Cardiovascular:      Rate and Rhythm: Tachycardia present. Rhythm irregular.      Pulses:           Dorsalis pedis pulses are detected w/ Doppler on the right side and detected w/ Doppler on the left side.      Comments: LCFA IABP in place, 1:1; site intact, no oozing or hematoma  Pulmonary:      Breath sounds: Rhonchi present.   Skin:     Findings: Bruising present.   Neurological:      Mental Status: He is easily aroused.       Assessment/Plan   Tesfaye Milton is a 78 y.o. male with PMH of CAD s/p multiple stents with known PCI to LAD and LCx (2011),  Afib failed multiple DCCV, ablation and antiarrythmic therapies on Xarelto and rate control strategy, HFrEF (20-25% LVEF), mod AS, HTN, prostate cancer s/p brachytherapy (2022) who presents as a transfer from [facility] in cardiogenic shock.     2/19: LCFA IABP placed    2/20: IABP 1:1, augementation 120. Checked positioning on CXR, confirmed with IC fellow is in stable position. remains on vaso, norepi; atrial fibrillation with RVR noted on tele    Cardiogenic shock   Severe triple vessel disease  HFrEF 20-25%  - [facility] Cath: 2/19: severe pLAD lesion, as well as critical Lcx and RCA disease  - Intra- aortic balloon pump placed on 2/19 after transfer from [facility]  - TTE 2/13: LV EF 20-25%, multiple segmental abnormalities; impaired relaxation pattern of LV diastolic filling, moderate MR   - Current settings: 1:1  - Plan for weaning as hemodynamically tolerated    Recommendations:  - daily CXR with IABP  - please maintain strict bedrest while IABP in place  - closely monitor groin insertion site and distal pulses  - May elevate HOB no greater than 30 degrees  - May log roll patient side to side without flexing involved extremity  - Interventional cardiology will continue to follow, will defer primary care to CICU team     Code Status:  Full Code    I spent 30 minutes in the professional and overall care of this patient.    NEGRITA Donis-CNP

## 2024-02-20 NOTE — PROGRESS NOTES
"Tesfaye Milton is a 78 y.o. male on day 1 of admission presenting with Shock (CMS/HCC).    Subjective   Patient seen and examined at the bedside. NAEON. Denies any fever, chills, lightheadedness, dizziness, shortness of breath, chest pain, abominal pain, nausea/vomting . [place] numbers this am, CVP 4, wedge 18, CO/CI: 7.5/3.1    Drips: milrinone 0.125, levo 0.5,     Objective     Physical Exam  sical Exam  Constitutional: NAD, laying in bed   Eyes: EOMI, clear sclera   ENMT: moist mucous membranes   Head/Neck: no appreciable JVD, swan in right internal jugular, TLC in left IJ   Respiratory/Thorax: clear to auscultation posteriorly , normal WOB on 2L O2   Cardiovascular: hushing from pulsatile IABP    Gastrointestinal: soft, NT, ND, +BS   Extremities: BLE cool, IABP in left groin, B/L DP/PT are dopplerable. No LE edema or asymmetry   Neurological: AOx4, conversational, following commands, no gross focal neuro deficits   Skin: cool and dry       Last Recorded Vitals  Blood pressure 95/59, pulse 107, temperature 35.8 °C (96.4 °F), temperature source Core, resp. rate 15, height 1.905 m (6' 3\"), weight 111 kg (244 lb 11.4 oz), SpO2 96 %.  Intake/Output last 3 Shifts:  I/O last 3 completed shifts:  In: 2437.6 (22 mL/kg) [I.V.:1937.6 (17.5 mL/kg); IV Piggyback:500]  Out: 5 (0 mL/kg) [Blood:5]  Weight: 111 kg     Relevant Results  Scheduled medications  aspirin, 81 mg, oral, Daily  clopidogrel, 75 mg, oral, Daily  fentaNYL PF, , ,   insulin lispro, 0-10 Units, subcutaneous, TID with meals  pantoprazole, 40 mg, oral, Daily before breakfast  piperacillin-tazobactam, 2.25 g, intravenous, q6h  polyethylene glycol, 17 g, oral, Daily  sennosides-docusate sodium, 2 tablet, oral, BID      Continuous medications  heparin, 0-4,500 Units/hr, Last Rate: 1,600 Units/hr (02/20/24 0624)  milrinone, 0.1-0.75 mcg/kg/min, Last Rate: 0.125 mcg/kg/min (02/20/24 0614)  norepinephrine, 0.01-1 mcg/kg/min, Last Rate: 0.5 mcg/kg/min (02/20/24 " 0624)  phenylephrine, 0-9 mcg/kg/min, Last Rate: Stopped (02/20/24 0406)  vasopressin, 0.01-0.06 Units/min, Last Rate: 0.03 Units/min (02/20/24 0614)      PRN medications  PRN medications: dextrose 10 % in water (D10W), dextrose, fentaNYL PF, glucagon, heparin, oxygen     Lab Results   Component Value Date    WBC 19.3 (H) 02/20/2024    HGB 13.1 (L) 02/20/2024    HCT 37.6 (L) 02/20/2024     02/20/2024    CHOL 147 12/15/2023    TRIG 86 12/15/2023    HDL 47.6 12/15/2023     (H) 02/20/2024     (H) 02/20/2024     (L) 02/20/2024    K 4.9 02/20/2024    CL 95 (L) 02/20/2024    CREATININE 3.09 (H) 02/20/2024    BUN 70 (H) 02/20/2024    CO2 25 02/20/2024    TSH 0.82 12/15/2023    PSA 7.7 (H) 08/12/2020    INR 1.2 (H) 02/19/2024    HGBA1C 5.4 02/19/2024         Assessment/Plan   Principal Problem:    Shock (CMS/HCC)  Mr Tesfaye Milton is a 78 y.o. male with PMH of CAD s/p PCI to LAD and LCx (2011), Afib failed multiple DCCV, ablation and antiarrythmic therapies on Xarelto and rate control strategy, HFrEF (20-25% LVEF), mod AS, HTN, prostate cancer s/p brachytherapy (2022) who presents as a transfer from [facility] in cardiogenic shock. Initially presented with SOB, hypotension and hypo perfusion with elevated lactate, JEFF and elevated transaminitis. EKG showed SHYAM III, aVF and ST depressions in I, II, V4-V6. Initially managed with ionotropic support with some improvement in renal and liver function. Additionally treated for sepsis with with antibiotics. LHC on 2/19 demonstrated triple vessel disease and TTE 2/13 showed known LVEF 20-25% with global hypokinesia, aortic valve with 0.6 RAUDEL, PG/MG of 25/13. Transferred to [facility] CICU for further management of shock and coronary revascularization.  On arrival was mentating well but he was cool and dry, and is anuric with elevated Cr to 2.51. [place] numbers demonstrated elevated filling pressures and low cardio index despite pharmacologic support with  dobutamine and levophed 0.16. Presentation was consistent with SCAI Stage D shock, and  escalated to MCS with IABP placement on 2/19. Currently proceeded with swan guided management and  he continues to require pharmacological support for shock. Plan to consider high risk complex coronary intervention by Dr Banerjee as he would not be a surgical candidate. Renal following for possible CVVH given anuric JEFF     Updates:  -Wean off vaso and then try to wean off levo  -Milrinone weaned off  - Once levo weaned down, will start nitroglycerin, eventually plan to start nipride     Plan:  NEURO/PSYCH: JONAH     PULM:   #Acute hypoxic respiratory failure  DDx: volume overload/ADHF and cannot r/o PNA  CXR 2/17: Cardiomegaly and pulmonary vascular congestion suggesting fluid overload. Subtle airspace opacities greater in the left perihilar region. Please correlate for pneumonia  s/p Lasix 40 IV once 2/17  MRSA negative  Plan:  -Holding off diuresis with CVP   -c/w zosyn (2/18- date)  - O2 at 2L, wean as tolerated     CV:   #HFrEF (LVEF 20-25%)  #CAD s/p multiple stents with known PCI to LAD and LCx (2011)  #NSTEMI  #Mod AS   RAUDEL 0.6, PG/MG of 25/13, calcified AV leaflets with poor opening of the valve c/f LFLG  Bl[place]ard on presentation: CVP (not readable/clotted), PA 36/20(16) and CO/CI 6.3/2.6. MVO2 72%, SVR CNR  Repeat swan: CVP 6, PA: 66/35 (48), cannot wedge, CO/CI: 3.6/1.5 SVR 1399 on vaso 0.02, levo 0.2, dobut 5 MVO2 54%  Plan:  -Hold home metoprolol, entresto while in shock  -c/w asa, Plavix  -Start statin once transaminitis resolved  -c/w heparin gtt  -c/w dobutamine, norepi  -Blanchard q2-4hours  - Will continue to evaluate hemodynamics for MCS > discussed with Dr Bruce will proceed with IABP     #Afib  No beta block   -c/w heparin gtt     GI:   #Shock liver, resolving  AST 1488>375>214>106  ALT 1073>886>452>314  -CTM     #GI ppx  -start pantoprazole    RENAL:   #JEFF  Cr: 2.9>4.3>2.5>1.6>1.0>2.51  Currently Anuric; bladder  scan empty  Renal US 2/14 normal  Multifactorial etiology: contrast use, shock leading to ATN  Plan:  -RFP [id number]  -Consult renal for CVVH  -Strict I/O, renally dose meds  -Maintain MAP > 65 for renal perfusion  -Consult nephrology  - Bladder scan [id number]; avoiding indwelling catheter due to anuria     #prostate cancer s/p brachytherapy  Not active. Not on tamsulosin or mirabegron      HEME/ONC:   JONAH     ID:   #PNA  #c/f septic shock  White count  BCx 2/13 x 2 negative  MRSA negative; UCx 2/13: negative  s/p CTX 2/13-2/17  Plan  -c/w zosyn (2/18 till date)  -f/u repeat BCx x 2 2/19     ENDO:  #Hyperglycemia  In setting of steroid use for COPD exacerbation  -SSI, f/u A1c     N: cardiac  GI: pantoprazole  DVT: heparin gtt  A: PIVs, kathy, nik     NOK: Wife Misti [phone] (home) and [phone](mobile)  FULL code (confirmed on admission)           Brynn Salazar MD    Attending Note:  I have reviewed and evaluated the most recent data and results, personally examined the patient, and formulated the plan of care as presented above. This patient was critically ill and required continued critical care treatment. Teaching and any separately billable procedures are not included in the time calculation.    Tesfaye Milton is a 78 y.o. male on day 1 of admission presenting with cardiogenic Shock (CMS/HCC), CAD, HFrEF, atrial fibrillation, Moderate AS, JEFF, on vaso and Norepi.       Neurologically: Awake and alert    Cardiovascular:     Cath report:   ECHO: EF:20-25%, Valvular disease: none    Lab Results   Component Value Date    CKTOTAL 89 01/06/2021       Pulmonary:  === 02/19/24 ===    XR CHEST 1 VIEW    - Impression -  1.  Interval placement of left Trialysis line with tip overlying the  mid SVC.  2. No change or interval improvement in interstitial pulmonary edema  and patchy bilateral infiltrates.  3.  Additional medical devices as described above.    I personally reviewed the images/study and I agree with  Ria Asencio DO's (radiology resident) findings as stated. This study  was interpreted at [facility],  [place], [place].    MACRO:  None    Signed by: Piotr Cramer 2/20/2024 10:02 AM  Dictation workstation:   [id number]     Renal: Estimated Creatinine Clearance: 27.4 mL/min (A) (by C-G formula based on SCr of 3.09 mg/dL (H)).  Lab Results   Component Value Date    CREATININE 3.09 (H) 02/20/2024       Heme-onc:   Lab Results   Component Value Date    HGB 13.1 (L) 02/20/2024     Lab Results   Component Value Date     02/20/2024        GI:none    Nutrition: Oral intake    Vascular: None    ID: abx    Plan:  1) wean pressors  2) discuss PCI  3) monitor UO  4) transfuse    Billing Provider Critical Care Time: 45 minutes    Jose Pimentel DO

## 2024-02-20 NOTE — PROCEDURES
Procedures    Trialysis line placement    Date/Time: 2/19/2024 10:30 PM     Performed by: Nathen Sneed MD  Authorized by: Nathen Sneed MD    Consent:     Consent obtained:  Written    Consent given by:  Patient    Risks, benefits, and alternatives were discussed: yes      Risks discussed:  Arterial puncture, bleeding, infection, incorrect placement and pneumothorax  Universal protocol:     Immediately prior to procedure, a time out was called: yes      Patient identity confirmed:  Arm band  Pre-procedure details:     Indication(s): central venous access      Skin preparation:  Chlorhexidine  Anesthesia:     Anesthesia method:  Local infiltration    Local anesthetic:  Lidocaine 2% w/o epi  Procedure details:     Location:  L internal jugular    Patient position:  Supine    Catheter size:  13 Fr    Ultrasound guidance: yes      Ultrasound guidance timing: real time      Number of attempts:  1    Successful placement: yes    Post-procedure details:     Post-procedure:  Line sutured and dressing applied    Assessment:  Blood return through all ports, no pneumothorax on x-ray, placement verified by x-ray and free fluid flow    Procedure completion:  Tolerated

## 2024-02-21 ENCOUNTER — APPOINTMENT (OUTPATIENT)
Dept: CARDIOLOGY | Facility: HOSPITAL | Age: 79
DRG: 270 | End: 2024-02-21
Payer: MEDICARE

## 2024-02-21 ENCOUNTER — APPOINTMENT (OUTPATIENT)
Dept: RADIOLOGY | Facility: HOSPITAL | Age: 79
DRG: 270 | End: 2024-02-21
Payer: MEDICARE

## 2024-02-21 LAB
ALBUMIN SERPL BCP-MCNC: 2.7 G/DL (ref 3.4–5)
ALBUMIN SERPL BCP-MCNC: 2.8 G/DL (ref 3.4–5)
ALP SERPL-CCNC: 61 U/L (ref 33–136)
ALT SERPL W P-5'-P-CCNC: 238 U/L (ref 10–52)
ANION GAP BLDA CALCULATED.4IONS-SCNC: 9 MMO/L (ref 10–25)
ANION GAP BLDMV CALCULATED.4IONS-SCNC: 7 MMO/L (ref 10–25)
ANION GAP BLDMV CALCULATED.4IONS-SCNC: 8 MMO/L (ref 10–25)
ANION GAP BLDMV CALCULATED.4IONS-SCNC: 8 MMO/L (ref 10–25)
ANION GAP BLDMV CALCULATED.4IONS-SCNC: 9 MMO/L (ref 10–25)
ANION GAP SERPL CALC-SCNC: 15 MMOL/L (ref 10–20)
ANION GAP SERPL CALC-SCNC: 8 MMOL/L (ref 10–20)
AST SERPL W P-5'-P-CCNC: 77 U/L (ref 9–39)
ATRIAL RATE: 394 BPM
BASE EXCESS BLDA CALC-SCNC: 1.9 MMOL/L (ref -2–3)
BASE EXCESS BLDMV CALC-SCNC: 2.2 MMOL/L (ref -2–3)
BASE EXCESS BLDMV CALC-SCNC: 3.3 MMOL/L (ref -2–3)
BASE EXCESS BLDMV CALC-SCNC: 5.4 MMOL/L (ref -2–3)
BASE EXCESS BLDMV CALC-SCNC: 5.4 MMOL/L (ref -2–3)
BASOPHILS # BLD AUTO: 0.01 X10*3/UL (ref 0–0.1)
BASOPHILS NFR BLD AUTO: 0.1 %
BILIRUB DIRECT SERPL-MCNC: 0.3 MG/DL (ref 0–0.3)
BILIRUB SERPL-MCNC: 0.8 MG/DL (ref 0–1.2)
BODY TEMPERATURE: 37 DEGREES CELSIUS
BUN SERPL-MCNC: 80 MG/DL (ref 6–23)
BUN SERPL-MCNC: 84 MG/DL (ref 6–23)
CA-I BLD-SCNC: 1.01 MMOL/L (ref 1.1–1.33)
CA-I BLDA-SCNC: 1.08 MMOL/L (ref 1.1–1.33)
CA-I BLDMV-SCNC: 1.1 MMOL/L (ref 1.1–1.33)
CA-I BLDMV-SCNC: 1.11 MMOL/L (ref 1.1–1.33)
CA-I BLDMV-SCNC: 1.11 MMOL/L (ref 1.1–1.33)
CA-I BLDMV-SCNC: 1.14 MMOL/L (ref 1.1–1.33)
CALCIUM SERPL-MCNC: 8.1 MG/DL (ref 8.6–10.6)
CHLORIDE BLD-SCNC: 96 MMOL/L (ref 98–107)
CHLORIDE BLD-SCNC: 98 MMOL/L (ref 98–107)
CHLORIDE BLD-SCNC: 98 MMOL/L (ref 98–107)
CHLORIDE BLD-SCNC: 99 MMOL/L (ref 98–107)
CHLORIDE BLDA-SCNC: 100 MMOL/L (ref 98–107)
CHLORIDE SERPL-SCNC: 96 MMOL/L (ref 98–107)
CHLORIDE SERPL-SCNC: 99 MMOL/L (ref 98–107)
CO2 SERPL-SCNC: 26 MMOL/L (ref 21–32)
CO2 SERPL-SCNC: 32 MMOL/L (ref 21–32)
CREAT SERPL-MCNC: 2.38 MG/DL (ref 0.5–1.3)
CREAT SERPL-MCNC: 2.92 MG/DL (ref 0.5–1.3)
EGFRCR SERPLBLD CKD-EPI 2021: 22 ML/MIN/1.73M*2
EGFRCR SERPLBLD CKD-EPI 2021: 28 ML/MIN/1.73M*2
EOSINOPHIL # BLD AUTO: 0.03 X10*3/UL (ref 0–0.4)
EOSINOPHIL NFR BLD AUTO: 0.2 %
ERYTHROCYTE [DISTWIDTH] IN BLOOD BY AUTOMATED COUNT: 13.9 % (ref 11.5–14.5)
GLUCOSE BLD MANUAL STRIP-MCNC: 103 MG/DL (ref 74–99)
GLUCOSE BLD-MCNC: 113 MG/DL (ref 74–99)
GLUCOSE BLD-MCNC: 119 MG/DL (ref 74–99)
GLUCOSE BLD-MCNC: 129 MG/DL (ref 74–99)
GLUCOSE BLD-MCNC: 137 MG/DL (ref 74–99)
GLUCOSE BLDA-MCNC: 112 MG/DL (ref 74–99)
GLUCOSE SERPL-MCNC: 109 MG/DL (ref 74–99)
HCO3 BLDA-SCNC: 25.3 MMOL/L (ref 22–26)
HCO3 BLDMV-SCNC: 28.2 MMOL/L (ref 22–26)
HCO3 BLDMV-SCNC: 29 MMOL/L (ref 22–26)
HCO3 BLDMV-SCNC: 31 MMOL/L (ref 22–26)
HCO3 BLDMV-SCNC: 31.4 MMOL/L (ref 22–26)
HCT VFR BLD AUTO: 29.6 % (ref 41–52)
HCT VFR BLD EST: 26 % (ref 41–52)
HCT VFR BLD EST: 27 % (ref 41–52)
HCT VFR BLD EST: 29 % (ref 41–52)
HCT VFR BLD EST: 30 % (ref 41–52)
HCT VFR BLD EST: 30 % (ref 41–52)
HGB BLD-MCNC: 10.3 G/DL (ref 13.5–17.5)
HGB BLDA-MCNC: 9.9 G/DL (ref 13.5–17.5)
HGB BLDMV-MCNC: 10.1 G/DL (ref 13.5–17.5)
HGB BLDMV-MCNC: 8.7 G/DL (ref 13.5–17.5)
HGB BLDMV-MCNC: 9.1 G/DL (ref 13.5–17.5)
HGB BLDMV-MCNC: 9.5 G/DL (ref 13.5–17.5)
IMM GRANULOCYTES # BLD AUTO: 0.11 X10*3/UL (ref 0–0.5)
IMM GRANULOCYTES NFR BLD AUTO: 0.8 % (ref 0–0.9)
INHALED O2 CONCENTRATION: 21 %
INHALED O2 CONCENTRATION: 21 %
INHALED O2 CONCENTRATION: 32 %
LACTATE BLDA-SCNC: 0.9 MMOL/L (ref 0.4–2)
LACTATE BLDMV-SCNC: 0.8 MMOL/L (ref 0.4–2)
LACTATE BLDMV-SCNC: 0.9 MMOL/L (ref 0.4–2)
LACTATE BLDMV-SCNC: 0.9 MMOL/L (ref 0.4–2)
LACTATE BLDMV-SCNC: 1.1 MMOL/L (ref 0.4–2)
LYMPHOCYTES # BLD AUTO: 0.48 X10*3/UL (ref 0.8–3)
LYMPHOCYTES NFR BLD AUTO: 3.5 %
MAGNESIUM SERPL-MCNC: 2.59 MG/DL (ref 1.6–2.4)
MAGNESIUM SERPL-MCNC: 2.85 MG/DL (ref 1.6–2.4)
MCH RBC QN AUTO: 35 PG (ref 26–34)
MCHC RBC AUTO-ENTMCNC: 34.8 G/DL (ref 32–36)
MCV RBC AUTO: 101 FL (ref 80–100)
MONOCYTES # BLD AUTO: 1.07 X10*3/UL (ref 0.05–0.8)
MONOCYTES NFR BLD AUTO: 7.8 %
MRSA DNA SPEC QL NAA+PROBE: NOT DETECTED
NEUTROPHILS # BLD AUTO: 12.03 X10*3/UL (ref 1.6–5.5)
NEUTROPHILS NFR BLD AUTO: 87.6 %
NRBC BLD-RTO: 0 /100 WBCS (ref 0–0)
OXYHGB MFR BLDA: 96.4 % (ref 94–98)
OXYHGB MFR BLDMV: 53.2 % (ref 45–75)
OXYHGB MFR BLDMV: 54.6 % (ref 45–75)
OXYHGB MFR BLDMV: 55.1 % (ref 45–75)
OXYHGB MFR BLDMV: 58.4 % (ref 45–75)
PCO2 BLDA: 34 MM HG (ref 38–42)
PCO2 BLDMV: 49 MM HG (ref 41–51)
PCO2 BLDMV: 50 MM HG (ref 41–51)
PCO2 BLDMV: 50 MM HG (ref 41–51)
PCO2 BLDMV: 53 MM HG (ref 41–51)
PH BLDA: 7.48 PH (ref 7.38–7.42)
PH BLDMV: 7.36 PH (ref 7.33–7.43)
PH BLDMV: 7.38 PH (ref 7.33–7.43)
PH BLDMV: 7.38 PH (ref 7.33–7.43)
PH BLDMV: 7.4 PH (ref 7.33–7.43)
PHOSPHATE SERPL-MCNC: 4.8 MG/DL (ref 2.5–4.9)
PHOSPHATE SERPL-MCNC: 6.6 MG/DL (ref 2.5–4.9)
PLATELET # BLD AUTO: 148 X10*3/UL (ref 150–450)
PO2 BLDA: 120 MM HG (ref 85–95)
PO2 BLDMV: 34 MM HG (ref 35–45)
PO2 BLDMV: 35 MM HG (ref 35–45)
PO2 BLDMV: 36 MM HG (ref 35–45)
PO2 BLDMV: 36 MM HG (ref 35–45)
POTASSIUM BLDA-SCNC: 4.3 MMOL/L (ref 3.5–5.3)
POTASSIUM BLDMV-SCNC: 4.1 MMOL/L (ref 3.5–5.3)
POTASSIUM BLDMV-SCNC: 4.3 MMOL/L (ref 3.5–5.3)
POTASSIUM BLDMV-SCNC: 4.3 MMOL/L (ref 3.5–5.3)
POTASSIUM BLDMV-SCNC: 4.4 MMOL/L (ref 3.5–5.3)
POTASSIUM SERPL-SCNC: 4.4 MMOL/L (ref 3.5–5.3)
POTASSIUM SERPL-SCNC: 4.4 MMOL/L (ref 3.5–5.3)
PROT SERPL-MCNC: 4.7 G/DL (ref 6.4–8.2)
Q ONSET: 206 MS
QRS COUNT: 15 BEATS
QRS DURATION: 130 MS
QT INTERVAL: 390 MS
QTC CALCULATION(BAZETT): 482 MS
QTC FREDERICIA: 449 MS
R AXIS: 37 DEGREES
RBC # BLD AUTO: 2.94 X10*6/UL (ref 4.5–5.9)
SAO2 % BLDA: 99 % (ref 94–100)
SAO2 % BLDMV: 55 % (ref 45–75)
SAO2 % BLDMV: 55 % (ref 45–75)
SAO2 % BLDMV: 57 % (ref 45–75)
SAO2 % BLDMV: 60 % (ref 45–75)
SODIUM BLDA-SCNC: 130 MMOL/L (ref 136–145)
SODIUM BLDMV-SCNC: 131 MMOL/L (ref 136–145)
SODIUM BLDMV-SCNC: 131 MMOL/L (ref 136–145)
SODIUM BLDMV-SCNC: 132 MMOL/L (ref 136–145)
SODIUM BLDMV-SCNC: 132 MMOL/L (ref 136–145)
SODIUM SERPL-SCNC: 133 MMOL/L (ref 136–145)
SODIUM SERPL-SCNC: 135 MMOL/L (ref 136–145)
T AXIS: 181 DEGREES
T OFFSET: 401 MS
UFH PPP CHRO-ACNC: 0.6 IU/ML
VENTRICULAR RATE: 92 BPM
WBC # BLD AUTO: 13.7 X10*3/UL (ref 4.4–11.3)

## 2024-02-21 PROCEDURE — 71045 X-RAY EXAM CHEST 1 VIEW: CPT

## 2024-02-21 PROCEDURE — 99291 CRITICAL CARE FIRST HOUR: CPT | Performed by: STUDENT IN AN ORGANIZED HEALTH CARE EDUCATION/TRAINING PROGRAM

## 2024-02-21 PROCEDURE — 2500000004 HC RX 250 GENERAL PHARMACY W/ HCPCS (ALT 636 FOR OP/ED): Performed by: INTERNAL MEDICINE

## 2024-02-21 PROCEDURE — 83735 ASSAY OF MAGNESIUM: CPT | Performed by: STUDENT IN AN ORGANIZED HEALTH CARE EDUCATION/TRAINING PROGRAM

## 2024-02-21 PROCEDURE — 84132 ASSAY OF SERUM POTASSIUM: CPT

## 2024-02-21 PROCEDURE — 87641 MR-STAPH DNA AMP PROBE: CPT | Performed by: INTERNAL MEDICINE

## 2024-02-21 PROCEDURE — 71045 X-RAY EXAM CHEST 1 VIEW: CPT | Performed by: RADIOLOGY

## 2024-02-21 PROCEDURE — 2500000001 HC RX 250 WO HCPCS SELF ADMINISTERED DRUGS (ALT 637 FOR MEDICARE OP)

## 2024-02-21 PROCEDURE — 2500000002 HC RX 250 W HCPCS SELF ADMINISTERED DRUGS (ALT 637 FOR MEDICARE OP, ALT 636 FOR OP/ED): Mod: MUE

## 2024-02-21 PROCEDURE — 1100000001 HC PRIVATE ROOM DAILY

## 2024-02-21 PROCEDURE — 93005 ELECTROCARDIOGRAM TRACING: CPT

## 2024-02-21 PROCEDURE — 84132 ASSAY OF SERUM POTASSIUM: CPT | Performed by: STUDENT IN AN ORGANIZED HEALTH CARE EDUCATION/TRAINING PROGRAM

## 2024-02-21 PROCEDURE — 37799 UNLISTED PX VASCULAR SURGERY: CPT | Performed by: STUDENT IN AN ORGANIZED HEALTH CARE EDUCATION/TRAINING PROGRAM

## 2024-02-21 PROCEDURE — 2500000004 HC RX 250 GENERAL PHARMACY W/ HCPCS (ALT 636 FOR OP/ED)

## 2024-02-21 PROCEDURE — 85025 COMPLETE CBC W/AUTO DIFF WBC: CPT | Performed by: STUDENT IN AN ORGANIZED HEALTH CARE EDUCATION/TRAINING PROGRAM

## 2024-02-21 PROCEDURE — 80053 COMPREHEN METABOLIC PANEL: CPT | Performed by: STUDENT IN AN ORGANIZED HEALTH CARE EDUCATION/TRAINING PROGRAM

## 2024-02-21 PROCEDURE — 80047 BASIC METABLC PNL IONIZED CA: CPT | Performed by: STUDENT IN AN ORGANIZED HEALTH CARE EDUCATION/TRAINING PROGRAM

## 2024-02-21 PROCEDURE — 85520 HEPARIN ASSAY: CPT

## 2024-02-21 PROCEDURE — 87081 CULTURE SCREEN ONLY: CPT | Performed by: STUDENT IN AN ORGANIZED HEALTH CARE EDUCATION/TRAINING PROGRAM

## 2024-02-21 PROCEDURE — 84520 ASSAY OF UREA NITROGEN: CPT | Performed by: STUDENT IN AN ORGANIZED HEALTH CARE EDUCATION/TRAINING PROGRAM

## 2024-02-21 PROCEDURE — 2500000004 HC RX 250 GENERAL PHARMACY W/ HCPCS (ALT 636 FOR OP/ED): Performed by: STUDENT IN AN ORGANIZED HEALTH CARE EDUCATION/TRAINING PROGRAM

## 2024-02-21 PROCEDURE — 80051 ELECTROLYTE PANEL: CPT | Performed by: STUDENT IN AN ORGANIZED HEALTH CARE EDUCATION/TRAINING PROGRAM

## 2024-02-21 PROCEDURE — 37799 UNLISTED PX VASCULAR SURGERY: CPT

## 2024-02-21 PROCEDURE — 84100 ASSAY OF PHOSPHORUS: CPT | Performed by: STUDENT IN AN ORGANIZED HEALTH CARE EDUCATION/TRAINING PROGRAM

## 2024-02-21 PROCEDURE — 82947 ASSAY GLUCOSE BLOOD QUANT: CPT

## 2024-02-21 PROCEDURE — 82565 ASSAY OF CREATININE: CPT | Performed by: STUDENT IN AN ORGANIZED HEALTH CARE EDUCATION/TRAINING PROGRAM

## 2024-02-21 RX ORDER — BUMETANIDE 0.25 MG/ML
4 INJECTION INTRAMUSCULAR; INTRAVENOUS ONCE
Status: COMPLETED | OUTPATIENT
Start: 2024-02-21 | End: 2024-02-21

## 2024-02-21 RX ORDER — VANCOMYCIN HYDROCHLORIDE 1 G/200ML
1 INJECTION, SOLUTION INTRAVENOUS EVERY 12 HOURS
Status: DISCONTINUED | OUTPATIENT
Start: 2024-02-21 | End: 2024-02-22

## 2024-02-21 RX ADMIN — BUMETANIDE 4 MG: 0.25 INJECTION, SOLUTION INTRAMUSCULAR; INTRAVENOUS at 11:27

## 2024-02-21 RX ADMIN — BUMETANIDE 4 MG: 0.25 INJECTION, SOLUTION INTRAMUSCULAR; INTRAVENOUS at 17:59

## 2024-02-21 RX ADMIN — CLOPIDOGREL BISULFATE 75 MG: 75 TABLET ORAL at 08:56

## 2024-02-21 RX ADMIN — PIPERACILLIN SODIUM AND TAZOBACTAM SODIUM 2.25 G: 2; .25 INJECTION, SOLUTION INTRAVENOUS at 14:39

## 2024-02-21 RX ADMIN — HEPARIN SODIUM AND DEXTROSE 1600 UNITS/HR: 10000; 5 INJECTION INTRAVENOUS at 13:21

## 2024-02-21 RX ADMIN — PIPERACILLIN SODIUM AND TAZOBACTAM SODIUM 2.25 G: 2; .25 INJECTION, SOLUTION INTRAVENOUS at 07:48

## 2024-02-21 RX ADMIN — SENNOSIDES AND DOCUSATE SODIUM 2 TABLET: 8.6; 5 TABLET ORAL at 08:56

## 2024-02-21 RX ADMIN — PANTOPRAZOLE SODIUM 40 MG: 40 TABLET, DELAYED RELEASE ORAL at 07:48

## 2024-02-21 RX ADMIN — VANCOMYCIN HYDROCHLORIDE 1 G: 1 INJECTION, SOLUTION INTRAVENOUS at 22:39

## 2024-02-21 RX ADMIN — PIPERACILLIN SODIUM AND TAZOBACTAM SODIUM 2.25 G: 2; .25 INJECTION, SOLUTION INTRAVENOUS at 01:27

## 2024-02-21 RX ADMIN — PIPERACILLIN SODIUM AND TAZOBACTAM SODIUM 2.25 G: 2; .25 INJECTION, SOLUTION INTRAVENOUS at 20:28

## 2024-02-21 RX ADMIN — VANCOMYCIN HYDROCHLORIDE 1 G: 1 INJECTION, SOLUTION INTRAVENOUS at 11:28

## 2024-02-21 RX ADMIN — ASPIRIN 81 MG: 81 TABLET, COATED ORAL at 08:56

## 2024-02-21 RX ADMIN — SENNOSIDES AND DOCUSATE SODIUM 2 TABLET: 8.6; 5 TABLET ORAL at 20:28

## 2024-02-21 RX ADMIN — DEXTROSE MONOHYDRATE 0.08 MCG/KG/MIN: 50 INJECTION, SOLUTION INTRAVENOUS at 20:28

## 2024-02-21 ASSESSMENT — PAIN SCALES - GENERAL
PAINLEVEL_OUTOF10: 0 - NO PAIN

## 2024-02-21 ASSESSMENT — PAIN - FUNCTIONAL ASSESSMENT
PAIN_FUNCTIONAL_ASSESSMENT: 0-10

## 2024-02-21 NOTE — CARE PLAN
Problem: Skin  Goal: Decreased wound size/increased tissue granulation at next dressing change  2/20/2024 2157 by Wesly Lara RN  Outcome: Progressing  Flowsheets (Taken 2/20/2024 2157)  Decreased wound size/increased tissue granulation at next dressing change:   Promote sleep for wound healing   Utilize specialty bed per algorithm   Protective dressings over bony prominences  2/20/2024 2156 by Wesly Lara RN  Outcome: Progressing  Flowsheets (Taken 2/20/2024 0940 by Aileen Farrell RN)  Decreased wound size/increased tissue granulation at next dressing change: Protective dressings over bony prominences  Goal: Participates in plan/prevention/treatment measures  2/20/2024 2157 by Wesly Lara RN  Outcome: Progressing  Flowsheets (Taken 2/20/2024 2157)  Participates in plan/prevention/treatment measures:   Discuss with provider PT/OT consult   Elevate heels   Increase activity/out of bed for meals  2/20/2024 2156 by Wesly Lara RN  Outcome: Progressing  Flowsheets (Taken 2/19/2024 2258)  Participates in plan/prevention/treatment measures:   Discuss with provider PT/OT consult   Elevate heels   Increase activity/out of bed for meals  Goal: Prevent/manage excess moisture  2/20/2024 2157 by Wesly Lara RN  Outcome: Progressing  Flowsheets (Taken 2/20/2024 2157)  Prevent/manage excess moisture:   Cleanse incontinence/protect with barrier cream   Moisturize dry skin   Use wicking fabric (obtain order)   Follow provider orders for dressing changes   Monitor for/manage infection if present  2/20/2024 2156 by Wesly Lara RN  Outcome: Progressing  Flowsheets (Taken 2/19/2024 2258)  Prevent/manage excess moisture:   Cleanse incontinence/protect with barrier cream   Moisturize dry skin   Use wicking fabric (obtain order)   Follow provider orders for dressing changes   Monitor for/manage infection if present  Goal: Prevent/minimize sheer/friction injuries  2/20/2024 2157 by Wesly Lara RN  Outcome:  Progressing  Flowsheets (Taken 2/20/2024 2157)  Prevent/minimize sheer/friction injuries:   Complete micro-shifts as needed if patient unable. Adjust patient position to relieve pressure points, not a full turn   Increase activity/out of bed for meals   Use pull sheet   HOB 30 degrees or less   Turn/reposition every 2 hours/use positioning/transfer devices   Utilize specialty bed per algorithm  2/20/2024 2156 by Wesly Lara RN  Outcome: Progressing  Flowsheets (Taken 2/19/2024 2258)  Prevent/minimize sheer/friction injuries:   Complete micro-shifts as needed if patient unable. Adjust patient position to relieve pressure points, not a full turn   HOB 30 degrees or less   Increase activity/out of bed for meals   Turn/reposition every 2 hours/use positioning/transfer devices   Use pull sheet  Goal: Promote/optimize nutrition  2/20/2024 2157 by Wesly Lara RN  Outcome: Progressing  Flowsheets (Taken 2/20/2024 2157)  Promote/optimize nutrition:   Assist with feeding   Monitor/record intake including meals   Offer water/supplements/favorite foods   Consume > 50% meals/supplements   Discuss with provider if NPO > 2 days   Reassess MST if dietician not consulted  2/20/2024 2156 by Wesly Lara RN  Outcome: Progressing  Flowsheets (Taken 2/19/2024 2258)  Promote/optimize nutrition:   Assist with feeding   Discuss with provider if NPO > 2 days   Offer water/supplements/favorite foods   Consume > 50% meals/supplements   Monitor/record intake including meals   Reassess MST if dietician not consulted  Goal: Promote skin healing  2/20/2024 2157 by Wesly Lara RN  Outcome: Progressing  Flowsheets (Taken 2/20/2024 2157)  Promote skin healing:   Assess skin/pad under line(s)/device(s)   Protective dressings over bony prominences   Turn/reposition every 2 hours/use positioning/transfer devices   Rotate device position/do not position patient on device   Ensure correct size (line/device) and apply per   instructions  2/20/2024 2156 by Wesly Lara RN  Outcome: Progressing  Flowsheets (Taken 2/19/2024 2259)  Promote skin healing:   Ensure correct size (line/device) and apply per  instructions   Protective dressings over bony prominences   Rotate device position/do not position patient on device   Turn/reposition every 2 hours/use positioning/transfer devices   Assess skin/pad under line(s)/device(s)   The patient's goals for the shift include      The clinical goals for the shift include pt will maintain maps greater than 60 during this shift

## 2024-02-21 NOTE — CARE PLAN
Will initiate patient on CVVH for volume overload refractory to diuresis. Received 4 mg IVP bumex around 1800 without any UOP. Labs are acceptable. Patient is on 4L NC. Although, there are no emergent indications for RRT, patient's swan numbers have worsened through the course of the day - PCWP worsened to 29 from 17 earlier. CVP 4-7. The discrepancy between CVP and PCWP is believed to be 2/2 severe left heart failure despite balloon pump with good right heart function and compensation. They want to optimize his left heart function with volume removal. Shock most likely cardiogenic in nature vs. Septic. Given these findings, will initiate on CVVH as per submitted orders. Volume removal to be decided by the primary team.

## 2024-02-21 NOTE — PROGRESS NOTES
"  Internal Medicine Progress Note   [facility]  February 21, 2024    Patient: Tesfaye Milton    Medical Record: [id number]    Subjective   Overnight renal team paged for PCWP worsened to 29 from 17 earlier. CVP 4-7, not correlative to clinical status. Shortly after initiating CVVH for suspected volume overload, patient began producing urine with [place] readings CVP 6, wedge 26, CO/CI: 6.0/2.5. Levo stopped morning of 2/21 but then reinitated later in AM for MAPs in 50s.   [place] numbers 2/21 AM, CVP 4, wedge 12, CO/CI: 5.4/2.2, SvO2 59%. Patient reported feeling \"okay\". Feeling more \"wheezy\" this morning, but feels like WOB has improved overall. No other concerns. Denies any fevers, chills, chest pain, abdominal pain, nausea, vomiting.    Medications   Medications:   Scheduled medications  aspirin, 81 mg, oral, Daily  clopidogrel, 75 mg, oral, Daily  pantoprazole, 40 mg, oral, Daily before breakfast  piperacillin-tazobactam, 2.25 g, intravenous, q6h  polyethylene glycol, 17 g, oral, Daily  sennosides-docusate sodium, 2 tablet, oral, BID  vancomycin, 1 g, intravenous, q12h      Continuous medications  heparin, 0-4,500 Units/hr, Last Rate: 1,600 Units/hr (02/21/24 1321)  norepinephrine, 0.01-1 mcg/kg/min, Last Rate: 0.1 mcg/kg/min (02/21/24 1300)      PRN medications  PRN medications: acetaminophen, dextrose 10 % in water (D10W), dextrose, glucagon, heparin, oxyCODONE, oxygen     Objective   Vitals  Visit Vitals  BP 95/59   Pulse 101   Temp 36.1 °C (97 °F) (Temporal)   Resp 20        Intake/Output  I/O last 3 completed shifts:  In: 3774 (34 mL/kg) [P.O.:237; I.V.:2887 (26 mL/kg); IV Piggyback:650]  Out: 1200 (10.8 mL/kg) [Urine:1200 (0.3 mL/kg/hr)]  Weight: 111 kg     Physical Exam  Constitutional:       Appearance: Normal appearance.   Eyes:      Extraocular Movements: Extraocular movements intact.      Conjunctiva/sclera: Conjunctivae normal.   Neck:      Comments: [place] in left " IJ  Cardiovascular:      Rate and Rhythm: Normal rate and regular rhythm.   Pulmonary:      Effort: Pulmonary effort is normal.      Breath sounds: Normal breath sounds.   Abdominal:      General: Abdomen is flat. There is no distension.      Palpations: Abdomen is soft.      Tenderness: There is no abdominal tenderness.   Musculoskeletal:      Right lower leg: No edema.      Left lower leg: No edema.   Neurological:      Mental Status: He is alert and oriented to person, place, and time.   Psychiatric:         Mood and Affect: Mood normal.         Behavior: Behavior normal.     Labs  Results from last 7 days   Lab Units 02/21/24  0125 02/20/24  2310 02/20/24  0039   WBC AUTO x10*3/uL 13.7* 14.1* 19.3*   HEMOGLOBIN g/dL 10.3* 10.9* 13.1*   HEMATOCRIT % 29.6* 31.3* 37.6*   PLATELETS AUTO x10*3/uL 148* 152 217     Results from last 7 days   Lab Units 02/21/24  0125 02/20/24  1748 02/20/24  0039   SODIUM mmol/L 133* 132* 133*   POTASSIUM mmol/L 4.4 4.6 4.9   CO2 mmol/L 26 27 25   ANION GAP mmol/L 15 14 18   BUN mg/dL 84* 79* 70*   CREATININE mg/dL 2.92* 2.96* 3.09*   GLUCOSE mg/dL  --  131* 201*   EGFR mL/min/1.73m*2 22* 21* 20*   MAGNESIUM mg/dL 2.85* 2.86* 2.73*   PHOSPHORUS mg/dL 6.6* 6.8*  --       Results from last 7 days   Lab Units 02/21/24  0125 02/20/24  0039 02/19/24  1705   ALT U/L 238* 322* 300*   AST U/L 77* 110* 102*   ALK PHOS U/L 61 71 79      Results from last 7 days   Lab Units 02/19/24  2226 02/19/24  1202   INR  1.2* 1.0     Results from last 7 days   Lab Units 02/21/24  1152 02/21/24  1009 02/21/24  0634 02/19/24  1704 02/19/24  1204 02/19/24  1202 02/17/24  2241   POCT PH, ARTERIAL pH  --  7.48*  --   --  7.37*  --  7.52*   POCT PO2, ARTERIAL mm Hg  --  120*  --   --  117*  --  114*   POCT PCO2, ARTERIAL mm Hg  --  34*  --   --  42  --  36*   FIO2 % 32 32 21   < > 33   < > 30    < > = values in this interval not displayed.     Results from last 7 days   Lab Units 02/19/24  1912   POCT PH, VENOUS  pH 7.37   POCT PCO2, VENOUS mm Hg 46     Results from last 7 days   Lab Units 02/19/24  1202   LACTATE mmol/L 1.8         Imaging  === 02/19/24 ===    XR CHEST 1 VIEW    - Impression -  1.  Interval placement of left Trialysis line with tip overlying the  mid SVC.  2. No change or interval improvement in interstitial pulmonary edema  and patchy bilateral infiltrates.  3.  Additional medical devices as described above.    I personally reviewed the images/study and I agree with Ria Asencio DO's (radiology resident) findings as stated. This study  was interpreted at [place], [place].    MACRO:  None    Signed by: Piotr Cramer 2/20/2024 10:02 AM  Dictation workstation:   [id number]   === 10/28/21 ===    CT CHEST SCREENING FOR LUNG CANCER    - Impression -  1. Small stable nodularities bilaterally. These may be related to scarring.  No new or enlarging nodules.  2. Severe atherosclerotic coronary calcifications and cardiomegaly  3. Mildly dilated main pulmonary artery similar to the prior exam can be  seen with pulmonary arterial hypertension..    RECOMMENDATIONS: Continued annual low dose CTis recommended.  Category: Lung-RADS Category 2 --  Nodules with a very low likelihood of  becoming a clinically active cancer due to size or lack of growth.  Continue  annual screening with LDCT in 12 months.      Recommendations above based on NCCN guidelines Version 1.2021 for lung  cancer screening.    NOTE:  Nodule size measurements are mean diameters, the average of the  longest diameter of the nodule and its perpendicular diameter.      [id number]    This report has been produced using speech recognition.  This exam is available in DICOM format to non-affiliated healthcare  facilities on a secure media free searchable basis with prior patient  authorization.  The patient exposure is reported to a radiation dose index  registry.  All CT examinations are performed  with one or more of the  following dose reduction techniques: Automated Exposure Control, Adjustment  of mA and/or KV according to patient size, or use of iterative  reconstruction techniques.    Original Interpreting Physician:   MYLES GARCIA MD  Original Transcribed by/Date: PSCB   Oct 28 2021  2:50P  Original Electronically Signed by/Date: MYLES GARCIA MD Oct 28 2021  2:50P    Addendum Interpreting Physician:  Addendum Transcribed by/Date: NO ADDENDUM  Addendum Electronically Signed by/Date:       Assessment/Plan   Tesfaye Milton is a 78 y.o. male with PMH of CAD s/p multiple stents with known PCI to LAD and LCx (2011), Afib failed multiple DCCV, ablation and antiarrythmic therapies on Xarelto and rate control strategy, HFrEF (20-25% LVEF), mod AS, HTN, prostate cancer s/p brachytherapy (2022) who presents as a transfer from [facility] in cardiogenic shock. Nephrology consulted for anuria and JEFF. Was not making urine since 2/19 6 pm (the time he arrived at ). Recommended lopez catheter placement, BMP BID and close monitoring for CRRT with potential to start within 24h. Etiology of worsening kidney function is multifactorial; CRS, cardiogenic shock, SHELLIE, Zosyn usage. Patient currently with hemodynamic monitoring with RHC and on norepinephrine, vaso stopped 2/20 AM. No signs of significant volume overload on exam. Currently on 3L NC. Recommend to reduce intake of fluids as much as possible, prefer using isotonic solutions if giving fluids, No urgent indications for RRT at this time, but will continue close monitoring of hemodynamics and worsening O2 requirement which may necessitate earlier RRT. Had brief initation of CVVH night of 2/20 for c/f volume overlaod, however started making urine shortly after. CVVH stopped. Patient with improved UOP 2/21 of 1646cc.      #JEFF Stage III, non-oliguric likely 2/2 ischemic tubular injury in the setting of cardiogenic shock   - Baseline creatinine: 0.9-1.0  - Electrolytes (Na,  K, Ca, Phos): stable  - Discontinued levo morning of 2/21, restarted later in morning for MAPs of 50s; on norepinephrine 0.1   - Creatinine trends: 2.9>4.3>2.5>1.6>1.0>2.51 >2.84>3.09>2.92 (s/p short initiation of CVVH overnight 2/20)     - UOP 1646 2/20, 764cc UOP this morning, continuing to monitor     Recommendations:  - BMP BID, continuing to monitor   - No urgent indication for kidney replacement therapy.   - Please limit infusion of fluids as much as possible, recommend isotonic solutions if giving fluids   - Agree with continuing hemodynamic monitoring; if patient becomes more hypervolemic and has increased O2 requirements, will consider dialysis more urgently   - Maintain MAP > 65 for renal perfusion   - Avoid nephrotoxins, contrast if possible  - strict Is/Os  - Renal dosing for medications for latest eGFR, follow medication trough as appropriate     Thank you for this consult. Nephrology will continue to follow this patient. Discussed with attending nephrologist,  Dr. Gomes.     Belem Pat MD  24 hour Renal Pager - [phone]

## 2024-02-21 NOTE — PROGRESS NOTES
Vancomycin Dosing by Pharmacy- INITIAL    Tesfaye Milton is a 78 y.o. year old male who Pharmacy has been consulted for vancomycin dosing for pneumonia. Based on the patient's indication and renal status this patient will be dosed based on a goal trough/random level of 15-20 (CRRT).     Visit Vitals  BP 95/59   Pulse 106   Temp 35.5 °C (95.9 °F) (Temporal)   Resp 20        Lab Results   Component Value Date    CREATININE 2.92 (H) 02/21/2024    CREATININE 2.96 (H) 02/20/2024    CREATININE 3.09 (H) 02/20/2024    CREATININE 2.84 (H) 02/19/2024      I/O last 3 completed shifts:  In: 3774 (34 mL/kg) [P.O.:237; I.V.:2887 (26 mL/kg); IV Piggyback:650]  Out: 1200 (10.8 mL/kg) [Urine:1200 (0.3 mL/kg/hr)]  Weight: 111 kg     Lab Results   Component Value Date    PATIENTTEMP 37.0 02/21/2024    PATIENTTEMP 37.0 02/20/2024    PATIENTTEMP 37.0 02/20/2024      Assessment/Plan     Patient on CRRT. Initiate 1g Q12H. Follow up level prior to 3rd dose on 2/22 with AM labs.   Will continue to monitor renal function daily while on vancomycin and order serum creatinine at least every 48 hours if not already ordered.  Follow for continued vancomycin needs, clinical response, and signs/symptoms of toxicity.     Fallon Odell RP

## 2024-02-21 NOTE — PROGRESS NOTES
Social Work Transitional Care Note: (late note)   -Patient discussed during CICU 3pm interdisciplinary rounds.   -ICU Treatment Plan: The patient was transferred from [facility] on 2/19 for cardiogenic shock and SW guided therapy,   - Payer source: Medicare - No secondary is listed  - Primary contact:  The patients spouse Misti is listed as NOK  - Planned Disposition: Pending medical outcome and rehab recommendations   - Potential Barriers: none noted at this time. SW will continue to follow  -Anticipated Date of Discharge:  3/20/2024  Rocio ALONZO, LSW

## 2024-02-21 NOTE — PROGRESS NOTES
Subjective Data:  Patient seen this AM, opens eyes to verbal stimulus and nods head to question. Very hard of hearing, sleeping through most of exam.     Overnight Events:    IABP placed in cath lab 2/19 evening   Making urine now, hematuria     Objective Data:  Last Recorded Vitals:  Vitals:    02/21/24 0700 02/21/24 0800 02/21/24 0900 02/21/24 1000   BP:       BP Location:       Pulse: 105 (!) 121 106 107   Resp: 17 16 20 18   Temp:  35.5 °C (95.9 °F)     TempSrc:  Temporal     SpO2:  99% 100%    Weight: 115 kg (252 lb 10.4 oz)      Height:           Last Labs:  CBC - 2/21/2024:  1:25 AM  13.7 10.3 148    29.6      CMP - 2/20/2024:  5:48 PM  8.1 4.7 77 --- 0.8   6.6 2.8 238 61      PTT - 2/19/2024: 10:26 PM  1.2   13.5 >200     TROPHS   Date/Time Value Ref Range Status   02/19/2024 12:02 PM 3,463 0 - 53 ng/L Final     BNP   Date/Time Value Ref Range Status   02/19/2024 12:02 PM 1,905 0 - 99 pg/mL Final     HGBA1C   Date/Time Value Ref Range Status   02/19/2024 12:02 PM 5.4 see below % Final   07/09/2021 09:12 AM 5.2 % Final     Comment:          Diagnosis of Diabetes-Adults   Non-Diabetic: < or = 5.6%   Increased risk for developing diabetes: 5.7-6.4%   Diagnostic of diabetes: > or = 6.5%  .       Monitoring of Diabetes                Age (y)     Therapeutic Goal (%)   Adults:          >18           <7.0   Pediatrics:    13-18           <7.5                   7-12           <8.0                   0- 6            7.5-8.5   American Diabetes Association. Diabetes Care 33(S1), Jan 2010.       LDLCALC   Date/Time Value Ref Range Status   12/15/2023 10:28 AM 82 <=99 mg/dL Final     Comment:                                 Near   Borderline      AGE      Desirable  Optimal    High     High     Very High     0-19 Y     0 - 109     ---    110-129   >/= 130     ----    20-24 Y     0 - 119     ---    120-159   >/= 160     ----      >24 Y     0 -  99   100-129  130-159   160-189     >/=190     05/09/2020 04:24  67 -  130 MG/DL Final     VLDL   Date/Time Value Ref Range Status   12/15/2023 10:28 AM 17 0 - 40 mg/dL Final   09/20/2023 09:51 AM 14 0 - 40 mg/dL Final   06/30/2022 09:30 AM 19 0 - 40 mg/dL Final   01/18/2022 12:50 PM 21 0 - 40 mg/dL Final      Last I/O:  I/O last 3 completed shifts:  In: 3774 (34 mL/kg) [P.O.:237; I.V.:2887 (26 mL/kg); IV Piggyback:650]  Out: 1200 (10.8 mL/kg) [Urine:1200 (0.3 mL/kg/hr)]  Weight: 111 kg     Inpatient Medications:  Scheduled medications   Medication Dose Route Frequency    aspirin  81 mg oral Daily    clopidogrel  75 mg oral Daily    insulin lispro  0-10 Units subcutaneous TID with meals    pantoprazole  40 mg oral Daily before breakfast    piperacillin-tazobactam  2.25 g intravenous q6h    polyethylene glycol  17 g oral Daily    sennosides-docusate sodium  2 tablet oral BID    vancomycin  1 g intravenous q12h     PRN medications   Medication    acetaminophen    dextrose 10 % in water (D10W)    dextrose    glucagon    heparin    heparin    heparin    oxyCODONE    oxygen    sodium chloride     Continuous Medications   Medication Dose Last Rate    heparin  0-4,500 Units/hr 1,600 Units/hr (02/21/24 0818)    norepinephrine  0.01-1 mcg/kg/min 0.08 mcg/kg/min (02/21/24 1000)    PrismaSol BGK 2/3.5  2,700 mL/hr       Physical Exam  Constitutional:       General: He is sleeping.      Appearance: He is ill-appearing.   Cardiovascular:      Rate and Rhythm: Tachycardia present. Rhythm irregular.      Pulses:           Dorsalis pedis pulses are detected w/ Doppler on the right side and detected w/ Doppler on the left side.      Comments: LCFA IABP in place, 1:1; site intact, no oozing or hematoma  Pulmonary:      Breath sounds: Rhonchi present.   Skin:     Findings: Bruising present.   Neurological:      Mental Status: He is easily aroused.       Assessment/Plan   Tesfaye Milton is a 78 y.o. male with PMH of CAD s/p multiple stents with known PCI to LAD and LCx (2011), Afib failed multiple DCCV,  ablation and antiarrythmic therapies on Xarelto and rate control strategy, HFrEF (20-25% LVEF), mod AS, HTN, prostate cancer s/p brachytherapy (2022) who presents as a transfer from [facility] in cardiogenic shock.     2/19: LCFA IABP placed    2/20: IABP 1:1, augmentation 120. Checked positioning on CXR, confirmed with IC fellow is in stable position. remains on vaso, norepi; atrial fibrillation with RVR noted on tele    2/21: IABP 1:1 assisted BP on console 59/48 (60), augmentation 76. Checked positioning on CXR, appropriate placement. Remains on vasopressors. Access site reinforced, with pressure tap, no hematoma/bruising noted.     Cardiogenic shock   Severe triple vessel disease  HFrEF 20-25%  - [facility] Cath: 2/19: severe pLAD lesion, as well as critical Lcx and RCA disease  - Intra- aortic balloon pump placed on 2/19 after transfer from [facility]  - TTE 2/13: LV EF 20-25%, multiple segmental abnormalities; impaired relaxation pattern of LV diastolic filling, moderate MR   - Current settings: 1:1  - Plan for weaning as hemodynamically tolerated    Recommendations:  - daily CXR with IABP  - please maintain strict bedrest while IABP in place  - closely monitor groin insertion site and distal pulses  - May elevate HOB no greater than 30 degrees  - May log roll patient side to side without flexing involved extremity  - Interventional cardiology will continue to follow, will defer primary care to CICU team     Code Status:  Full Code      Gasper Pena, APRN-CNP, DNP

## 2024-02-21 NOTE — PROGRESS NOTES
"Tesfaye Milton is a 78 y.o. male on day 2 of admission presenting with Shock (CMS/HCC).    Subjective   Patient seen and examined at the bedside. Overnight [place] numbers @ 17:59 were as follows: CVP 4, wedge 29, CO/CI: 4.9/2.0, SVR 1026, SVo2 65%. Was given bumex around 18:00 without UOP. O2 increased from 3L baseline to 5L NC (though not wearing bipap last night). Renal team paged with request to initiate CVVH due to declining swan numbers, increasing wedge with CVP not correlative to clinical status. Shortly after patient began producing urine with subsequent Alma readings (@ 0:55) CVP 6, wedge 26, CO/CI: 6.0/2.5.     [place]ma numbers this AM, CVP 4, wedge 12, CO/CI: 5.4/2.2, Svo2 59%.    Denies any fever, chills, lightheadedness, dizziness, shortness of breath, chest pain, abominal pain, nausea/vomiting .     Drips: levo stopped at 6 AM - restarted around 10 AM for A line MAPs in the 50s.     Objective     Physical Exam  sical Exam  Constitutional: NAD, laying in bed   Eyes: EOMI, clear sclera   ENMT: moist mucous membranes   Head/Neck: no appreciable JVD, swan in right internal jugular, TLC in left IJ   Respiratory/Thorax: clear to auscultation posteriorly , normal WOB on 2L O2   Cardiovascular: hushing from pulsatile IABP    Gastrointestinal: soft, NT, ND, +BS   Extremities: BLE cool, IABP in left groin, B/L DP/PT are dopplerable. No LE edema or asymmetry   Neurological: AOx4, conversational, following commands, no gross focal neuro deficits   Skin: cool and dry       Last Recorded Vitals  Blood pressure 95/59, pulse (!) 112, temperature 36.2 °C (97.2 °F), temperature source Core, resp. rate 21, height 1.905 m (6' 3\"), weight 111 kg (244 lb 11.4 oz), SpO2 96 %.  Intake/Output last 3 Shifts:  I/O last 3 completed shifts:  In: 3774 (34 mL/kg) [P.O.:237; I.V.:2887 (26 mL/kg); IV Piggyback:650]  Out: 1200 (10.8 mL/kg) [Urine:1200 (0.3 mL/kg/hr)]  Weight: 111 kg     Relevant Results  Scheduled medications  aspirin, 81 mg, " oral, Daily  clopidogrel, 75 mg, oral, Daily  insulin lispro, 0-10 Units, subcutaneous, TID with meals  pantoprazole, 40 mg, oral, Daily before breakfast  piperacillin-tazobactam, 2.25 g, intravenous, q6h  polyethylene glycol, 17 g, oral, Daily  sennosides-docusate sodium, 2 tablet, oral, BID      Continuous medications  heparin, 0-4,500 Units/hr, Last Rate: 1,600 Units/hr (02/20/24 2206)  norepinephrine, 0.01-1 mcg/kg/min, Last Rate: Stopped (02/21/24 0530)  PrismaSol BGK 2/3.5, 2,700 mL/hr  vasopressin, 0.01-0.06 Units/min, Last Rate: Stopped (02/20/24 1232)      PRN medications  PRN medications: acetaminophen, dextrose 10 % in water (D10W), dextrose, glucagon, heparin, heparin, heparin, oxyCODONE, oxygen, sodium chloride     Lab Results   Component Value Date    WBC 13.7 (H) 02/21/2024    HGB 10.3 (L) 02/21/2024    HCT 29.6 (L) 02/21/2024     (L) 02/21/2024    CHOL 147 12/15/2023    TRIG 86 12/15/2023    HDL 47.6 12/15/2023     (H) 02/21/2024    AST 77 (H) 02/21/2024     (L) 02/21/2024    K 4.4 02/21/2024    CL 96 (L) 02/21/2024    CREATININE 2.92 (H) 02/21/2024    BUN 84 (H) 02/21/2024    CO2 26 02/21/2024    TSH 0.82 12/15/2023    PSA 7.7 (H) 08/12/2020    INR 1.2 (H) 02/19/2024    HGBA1C 5.4 02/19/2024         Assessment/Plan   Principal Problem:    Shock (CMS/HCC)  Active Problems:    Atherosclerotic heart disease of native coronary artery without angina pectoris    Hypercholesterolemia    Shortness of breath    UTI (urinary tract infection)    Atrial flutter (CMS/HCC)    Cardiomyopathy (CMS/HCC)    Chronic atrial fibrillation (CMS/HCC)    Persistent atrial fibrillation (CMS/HCC)    Cigarette smoker    Essential hypertension    Heart failure (CMS/HCC)    History of coronary artery stent placement    Myocardial infarction (CMS/HCC)    Congestive heart failure (CMS/HCC)    Cardiogenic shock (CMS/HCC)    NSTEMI (non-ST elevated myocardial infarction) (CMS/HCC)   Tesfaye Milton is a 78 y.o.  male with PMH of CAD s/p PCI to LAD and LCx (2011), Afib failed multiple DCCV, ablation and antiarrythmic therapies on Xarelto and rate control strategy, HFrEF (20-25% LVEF), mod AS, HTN, prostate cancer s/p brachytherapy (2022) who presents as a transfer from [facility] in cardiogenic shock. Initially presented with SOB, hypotension and hypo perfusion with elevated lactate, JEFF and elevated transaminitis. EKG showed SHYAM III, aVF and ST depressions in I, II, V4-V6. Initially managed with ionotropic support with some improvement in renal and liver function. Additionally treated for sepsis with with antibiotics. LHC on 2/19 demonstrated triple vessel disease and TTE 2/13 showed known LVEF 20-25% with global hypokinesia, aortic valve with 0.6 RAUDEL, PG/MG of 25/13. Transferred to [facility] CICU for further management of shock and coronary revascularization.  On arrival was mentating well but he was cool and dry, and is anuric with elevated Cr to 2.51. [place] numbers demonstrated elevated filling pressures and low cardio index despite pharmacologic support with dobutamine and levophed 0.16. Presentation was consistent with SCAI Stage D shock, and  escalated to MCS with IABP placement on 2/19. Currently proceeded with swan guided management and  he continues to require pharmacological support for shock. Plan to consider high risk complex coronary intervention by Dr Banerjee as he would not be a surgical candidate. Renal following for possible CVVH given JEFF. Now producing urine w/ diuresis.      Updates 2/21  - Briefly off pressors (levo stopped around 6 AM) but restarted around 10 AM for MAPs in the 50s on the A line; lactate 0.9 - goal MAP 60 based on A line   - Vanc added for HAP coverage   - S/p 4 mg IV bumex this AM; goal net negative  - Plan for revascularization w/ Dr Banerjee 2/22  - NPO at midnight   - Once levo weaned down, will start nitroglycerin, eventually plan to start nipride     Plan:  NEURO/PSYCH: JONAH      PULM:   #Acute hypoxic respiratory failure  DDx: volume overload/ADHF and cannot r/o PNA  CXR 2/20: Patchy edema or consolidation, more prominent on the left,  similar to previous. Interval placement of right PICC line.  MRSA negative (2/16); Repeat (2/21) pending  Plan:  -Attempting diuresis (bumex 4mg IV 2/20; 4mg IV 2/21) with urine output   -c/w zosyn (2/18- date)  -starting Vanc (2/21 - ) for HAP coverage w/ worsening consolidation on CXR   - O2 at 3L, wean as tolerated     CV:   #HFrEF (LVEF 20-25%)  #CAD s/p multiple stents with known PCI to LAD and LCx (2011)  #NSTEMI  #Mod AS   RAUDEL 0.6, PG/MG of 25/13, calcified AV leaflets with poor opening of the valve c/f LFLG  [place] on presentation: CVP (not readable/clotted), PA 36/20(16) and CO/CI 6.3/2.6. MVO2 72%, SVR CNR  [place] from 6:00: CVP 4, PA 33/14, CO/CI 5.4/2.2, Wedge 12, Svo2 59%, off pressors   Plan:  -Hold home metoprolol, entresto while in shock  -c/w asa, Plavix  -Start statin once transaminitis resolved  -c/w heparin gtt  -c/w levo, wean as tolerated (off AM 2/21, restarted due to low A line MAPs)  -[place] q2-4hours  -IABP placed in cath lab 12/19 PM; set 1:1   -A line MAPs in the 50s with wide pulse pressure and discrepant from IABP MAPs (60s); lactate 0.9; goal MAP 55-60 based on A line reading   -plan for revascularization plan w/ Dr Banerjee 2/21     #Afib  No beta block   -c/w heparin gtt     GI:   #Shock liver, resolving  AST 1488>375>214>106> 77  ALT 1073>886>452>314> 238  -CTM     #GI ppx  -cont pantoprazole    RENAL:   #JEFF  Cr: 2.9>4.3>2.5>1.6>1.0>2.51>2.9  Renal US 2/14 normal  Multifactorial etiology: contrast use, shock leading to ATN  Was anuric: now producing urine s/p 4 mg IV bumex 2/20  Plan:  -RFP [id number]  -Renal following, appreciate recs   -Strict I/O, renally dose meds  -Maintain MAP > 65 for renal perfusion  - Bladder scan [id number]     #prostate cancer s/p brachytherapy  Not active. Not on tamsulosin or mirabegron      HEME/ONC:    JONAH     ID:   #PNA  #c/f septic shock  White count  BCx 2/13 x 2 negative  MRSA negative (2/16); UCx 2/13: negative  s/p CTX 2/13-2/17  Plan  -c/w zosyn (2/18 till date)  -add vanc (2/21 - )  -f/u repeat BCx x 2 2/19  -repeat MRSA nares (2/21)     ENDO:  #Hyperglycemia  In setting of steroid use for COPD exacerbation  -SSI, f/u A1c     N: cardiac  GI: pantoprazole  DVT: heparin gtt  A: PIVs, kathy, swan, trialysis      NOK: Wife Misti [phone] (home) and [phone](mobile)  FULL code (confirmed on admission)           Shahriar Damon MD    Attending Note:  I have reviewed and evaluated the most recent data and results, personally examined the patient, and formulated the plan of care as presented above. This patient was critically ill and required continued critical care treatment. Teaching and any separately billable procedures are not included in the time calculation.    Tesfaye Milton is a 78 y.o. male on day 2 of admission presenting with cardiogenic Shock (CMS/HCC), CAD, HFrEF, atrial fibrillation, Moderate AS, JEFF, on vaso and Norepi.       Neurologically: Awake and alert    Cardiovascular:     Cath report:   ECHO: EF:20-25%, Valvular disease: none    Lab Results   Component Value Date    CKTOTAL 89 01/06/2021       Pulmonary:  === 02/19/24 ===    XR CHEST 1 VIEW    - Impression -  1.  Interval placement of left Trialysis line with tip overlying the  mid SVC.  2. No change or interval improvement in interstitial pulmonary edema  and patchy bilateral infiltrates.  3.  Additional medical devices as described above.    I personally reviewed the images/study and I agree with Ria Asencio DO's (radiology resident) findings as stated. This study  was interpreted at [facility],  [place], [place].    MACRO:  None    Signed by: Piotr Cramer 2/20/2024 10:02 AM  Dictation workstation:   [id number]     Renal: Estimated Creatinine Clearance: 28.9 mL/min (A) (by JEANNIE  formula based on SCr of 2.92 mg/dL (H)).  Lab Results   Component Value Date    CREATININE 2.92 (H) 02/21/2024       Heme-onc:   Lab Results   Component Value Date    HGB 10.3 (L) 02/21/2024     Lab Results   Component Value Date     (L) 02/21/2024        GI:none    Nutrition: Oral intake    Vascular: None    ID: abx    Plan:  1) wean pressors  2) discuss PCI  3) monitor UO  4) transfuse    Billing Provider Critical Care Time: 45 minutes    Shahriar Damon MD

## 2024-02-21 NOTE — CARE PLAN
Problem: Skin  Goal: Decreased wound size/increased tissue granulation at next dressing change  Outcome: Progressing  Flowsheets (Taken 2/20/2024 0940 by Aileen Farrell RN)  Decreased wound size/increased tissue granulation at next dressing change: Protective dressings over bony prominences  Goal: Participates in plan/prevention/treatment measures  Outcome: Progressing  Flowsheets (Taken 2/19/2024 2258)  Participates in plan/prevention/treatment measures:   Discuss with provider PT/OT consult   Elevate heels   Increase activity/out of bed for meals  Goal: Prevent/manage excess moisture  Outcome: Progressing  Flowsheets (Taken 2/19/2024 2258)  Prevent/manage excess moisture:   Cleanse incontinence/protect with barrier cream   Moisturize dry skin   Use wicking fabric (obtain order)   Follow provider orders for dressing changes   Monitor for/manage infection if present  Goal: Prevent/minimize sheer/friction injuries  Outcome: Progressing  Flowsheets (Taken 2/19/2024 2258)  Prevent/minimize sheer/friction injuries:   Complete micro-shifts as needed if patient unable. Adjust patient position to relieve pressure points, not a full turn   HOB 30 degrees or less   Increase activity/out of bed for meals   Turn/reposition every 2 hours/use positioning/transfer devices   Use pull sheet  Goal: Promote/optimize nutrition  Outcome: Progressing  Flowsheets (Taken 2/19/2024 2258)  Promote/optimize nutrition:   Assist with feeding   Discuss with provider if NPO > 2 days   Offer water/supplements/favorite foods   Consume > 50% meals/supplements   Monitor/record intake including meals   Reassess MST if dietician not consulted  Goal: Promote skin healing  Outcome: Progressing  Flowsheets (Taken 2/19/2024 2258)  Promote skin healing:   Ensure correct size (line/device) and apply per  instructions   Protective dressings over bony prominences   Rotate device position/do not position patient on device   Turn/reposition  every 2 hours/use positioning/transfer devices   Assess skin/pad under line(s)/device(s)     Problem: Pain  Goal: My pain/discomfort is manageable  Outcome: Progressing  Flowsheets (Taken 2/19/2024 2258)  Resident's pain/discomfort is manageable:   Include resident/family/caregiver in decisions related to pain management   Offer non-pharmacological pain management interventions   Administer pain medication prior to activities that may trigger pain   Identify and avoid pain triggers     Problem: Safety  Goal: Patient will be injury free during hospitalization  Outcome: Progressing  Goal: I will remain free of falls  Outcome: Progressing  Flowsheets (Taken 2/19/2024 2258)  Resident will remain free of falls:   Utilize fall response kit   Apply bed/chair alarms as appropriate   Assist with toileting as orderd   Maintain bed at position as ordered (chair height, low bed)   Accompany resident as ordered (ex. 1:1, stand-by assist, dayroom monitoring, 15 minute checks, line of sight)   Use gait belt for all transfers     Problem: Daily Care  Goal: Daily care needs are met  Outcome: Progressing  Flowsheets (Taken 2/19/2024 2258)  Daily care needs are met:   Assess and monitor ability to perform self care and identify potential discharge needs   Assess skin integrity/risk for skin breakdown   Assist patient with activities of daily living as needed   Encourage independent activity per ability     Problem: Psychosocial Needs  Goal: Demonstrates ability to cope with hospitalization/illness  Outcome: Progressing  Flowsheets (Taken 2/19/2024 2258)  Demonstrates ability to cope with hospitalization/illness:   Encourage verbalization of feelings/concerns/expectations   Provide low-stimulation environment as needed   Assist resident to identify and practice own strengths and abilities   Encourage resident to set and complete small goals for self   Encourage participation in diversional activities   Reinforce positive adaptation of  new coping behaviors   Include resident/family/caregiver in decisions related to psychosocial needs  Goal: Collaborate with me, my family, and caregiver to identify my specific goals  Outcome: Progressing  Flowsheets (Taken 2/19/2024 1233 by Aileen Farrell RN)  Cultural Requests During Hospitalization: n/a  Spiritual Requests During Hospitalization: n/a     Problem: Discharge Barriers  Goal: My discharge needs are met  Outcome: Progressing  Flowsheets (Taken 2/19/2024 7784)  Resident's discharge needs are met:   Identify potential discharge barriers on admission and throughout stay   Involve resident/family/caregiver in discharge planning process   The patient's goals for the shift include      The clinical goals for the shift include pt will maintain maps greater than 60 during this shift

## 2024-02-22 ENCOUNTER — APPOINTMENT (OUTPATIENT)
Dept: RADIOLOGY | Facility: HOSPITAL | Age: 79
DRG: 270 | End: 2024-02-22
Payer: MEDICARE

## 2024-02-22 ENCOUNTER — APPOINTMENT (OUTPATIENT)
Dept: CARDIOLOGY | Facility: HOSPITAL | Age: 79
DRG: 270 | End: 2024-02-22
Payer: MEDICARE

## 2024-02-22 PROBLEM — I35.0 AORTIC STENOSIS: Status: ACTIVE | Noted: 2024-02-22

## 2024-02-22 LAB
ALBUMIN SERPL BCP-MCNC: 2.7 G/DL (ref 3.4–5)
ALBUMIN SERPL BCP-MCNC: 2.9 G/DL (ref 3.4–5)
ALP SERPL-CCNC: 61 U/L (ref 33–136)
ALT SERPL W P-5'-P-CCNC: 179 U/L (ref 10–52)
ANION GAP BLDA CALCULATED.4IONS-SCNC: 9 MMO/L (ref 10–25)
ANION GAP BLDMV CALCULATED.4IONS-SCNC: 10 MMO/L (ref 10–25)
ANION GAP BLDMV CALCULATED.4IONS-SCNC: 4 MMO/L (ref 10–25)
ANION GAP BLDMV CALCULATED.4IONS-SCNC: 8 MMO/L (ref 10–25)
ANION GAP BLDMV CALCULATED.4IONS-SCNC: 8 MMO/L (ref 10–25)
ANION GAP SERPL CALC-SCNC: 13 MMOL/L (ref 10–20)
ANION GAP SERPL CALC-SCNC: 14 MMOL/L
AST SERPL W P-5'-P-CCNC: 61 U/L (ref 9–39)
ATRIAL RATE: 129 BPM
ATRIAL RATE: 136 BPM
BASE EXCESS BLDA CALC-SCNC: 7.6 MMOL/L (ref -2–3)
BASE EXCESS BLDMV CALC-SCNC: 5.3 MMOL/L (ref -2–3)
BASE EXCESS BLDMV CALC-SCNC: 7.2 MMOL/L (ref -2–3)
BASE EXCESS BLDMV CALC-SCNC: 7.5 MMOL/L (ref -2–3)
BASE EXCESS BLDMV CALC-SCNC: 9.2 MMOL/L (ref -2–3)
BASOPHILS # BLD AUTO: 0.01 X10*3/UL (ref 0–0.1)
BASOPHILS NFR BLD AUTO: 0.1 %
BILIRUB DIRECT SERPL-MCNC: 0.4 MG/DL (ref 0–0.3)
BILIRUB SERPL-MCNC: 1 MG/DL (ref 0–1.2)
BODY TEMPERATURE: 37 DEGREES CELSIUS
BUN SERPL-MCNC: 71 MG/DL (ref 6–23)
BUN SERPL-MCNC: 80 MG/DL (ref 6–23)
CA-I BLDA-SCNC: 1.07 MMOL/L (ref 1.1–1.33)
CA-I BLDMV-SCNC: 1.05 MMOL/L (ref 1.1–1.33)
CA-I BLDMV-SCNC: 1.08 MMOL/L (ref 1.1–1.33)
CA-I BLDMV-SCNC: 1.09 MMOL/L (ref 1.1–1.33)
CA-I BLDMV-SCNC: 1.11 MMOL/L (ref 1.1–1.33)
CALCIUM SERPL-MCNC: 8.1 MG/DL (ref 8.6–10.6)
CALCIUM SERPL-MCNC: 8.2 MG/DL (ref 8.6–10.6)
CHLORIDE BLD-SCNC: 100 MMOL/L (ref 98–107)
CHLORIDE BLD-SCNC: 100 MMOL/L (ref 98–107)
CHLORIDE BLD-SCNC: 97 MMOL/L (ref 98–107)
CHLORIDE BLD-SCNC: 97 MMOL/L (ref 98–107)
CHLORIDE BLDA-SCNC: 96 MMOL/L (ref 98–107)
CHLORIDE SERPL-SCNC: 98 MMOL/L (ref 98–107)
CHLORIDE SERPL-SCNC: 98 MMOL/L (ref 98–107)
CO2 SERPL-SCNC: 27 MMOL/L (ref 21–32)
CO2 SERPL-SCNC: 31 MMOL/L (ref 21–32)
CREAT SERPL-MCNC: 2.19 MG/DL (ref 0.5–1.3)
CREAT SERPL-MCNC: 2.33 MG/DL (ref 0.5–1.3)
EGFRCR SERPLBLD CKD-EPI 2021: 28 ML/MIN/1.73M*2
EGFRCR SERPLBLD CKD-EPI 2021: 30 ML/MIN/1.73M*2
EOSINOPHIL # BLD AUTO: 0.06 X10*3/UL (ref 0–0.4)
EOSINOPHIL NFR BLD AUTO: 0.4 %
ERYTHROCYTE [DISTWIDTH] IN BLOOD BY AUTOMATED COUNT: 13.8 % (ref 11.5–14.5)
FIBRINOGEN PPP-MCNC: 524 MG/DL (ref 200–400)
GLUCOSE BLD-MCNC: 120 MG/DL (ref 74–99)
GLUCOSE BLD-MCNC: 123 MG/DL (ref 74–99)
GLUCOSE BLD-MCNC: 140 MG/DL (ref 74–99)
GLUCOSE BLD-MCNC: 143 MG/DL (ref 74–99)
GLUCOSE BLDA-MCNC: 128 MG/DL (ref 74–99)
GLUCOSE SERPL-MCNC: 118 MG/DL (ref 74–99)
GLUCOSE SERPL-MCNC: 122 MG/DL (ref 74–99)
HCO3 BLDA-SCNC: 32 MMOL/L (ref 22–26)
HCO3 BLDMV-SCNC: 29.9 MMOL/L (ref 22–26)
HCO3 BLDMV-SCNC: 32.5 MMOL/L (ref 22–26)
HCO3 BLDMV-SCNC: 32.6 MMOL/L (ref 22–26)
HCO3 BLDMV-SCNC: 34.1 MMOL/L (ref 22–26)
HCT VFR BLD AUTO: 26.6 % (ref 41–52)
HCT VFR BLD EST: 28 % (ref 41–52)
HCT VFR BLD EST: 30 % (ref 41–52)
HCT VFR BLD EST: 39 % (ref 41–52)
HGB BLD-MCNC: 9.4 G/DL (ref 13.5–17.5)
HGB BLDA-MCNC: 13 G/DL (ref 13.5–17.5)
HGB BLDMV-MCNC: 9.2 G/DL (ref 13.5–17.5)
HGB BLDMV-MCNC: 9.3 G/DL (ref 13.5–17.5)
HGB BLDMV-MCNC: 9.4 G/DL (ref 13.5–17.5)
HGB BLDMV-MCNC: 9.9 G/DL (ref 13.5–17.5)
IMM GRANULOCYTES # BLD AUTO: 0.19 X10*3/UL (ref 0–0.5)
IMM GRANULOCYTES NFR BLD AUTO: 1.3 % (ref 0–0.9)
INHALED O2 CONCENTRATION: 21 %
INHALED O2 CONCENTRATION: 32 %
INHALED O2 CONCENTRATION: 32 %
INHALED O2 CONCENTRATION: 36 %
INHALED O2 CONCENTRATION: 36 %
LACTATE BLDA-SCNC: 1.7 MMOL/L (ref 0.4–2)
LACTATE BLDMV-SCNC: 1 MMOL/L (ref 0.4–2)
LACTATE BLDMV-SCNC: 1.1 MMOL/L (ref 0.4–2)
LACTATE BLDMV-SCNC: 1.2 MMOL/L (ref 0.4–2)
LACTATE BLDMV-SCNC: 1.5 MMOL/L (ref 0.4–2)
LDH SERPL L TO P-CCNC: 472 U/L (ref 84–246)
LYMPHOCYTES # BLD AUTO: 0.61 X10*3/UL (ref 0.8–3)
LYMPHOCYTES NFR BLD AUTO: 4.2 %
MAGNESIUM SERPL-MCNC: 2.23 MG/DL (ref 1.6–2.4)
MAGNESIUM SERPL-MCNC: 2.37 MG/DL (ref 1.6–2.4)
MCH RBC QN AUTO: 34.9 PG (ref 26–34)
MCHC RBC AUTO-ENTMCNC: 35.3 G/DL (ref 32–36)
MCV RBC AUTO: 99 FL (ref 80–100)
MONOCYTES # BLD AUTO: 1.16 X10*3/UL (ref 0.05–0.8)
MONOCYTES NFR BLD AUTO: 8 %
NEUTROPHILS # BLD AUTO: 12.48 X10*3/UL (ref 1.6–5.5)
NEUTROPHILS NFR BLD AUTO: 86 %
NRBC BLD-RTO: 0 /100 WBCS (ref 0–0)
OXYHGB MFR BLDA: 89.8 % (ref 94–98)
OXYHGB MFR BLDMV: 43.4 % (ref 45–75)
OXYHGB MFR BLDMV: 52.5 % (ref 45–75)
OXYHGB MFR BLDMV: 53.3 % (ref 45–75)
OXYHGB MFR BLDMV: 58 % (ref 45–75)
PCO2 BLDA: 43 MM HG (ref 38–42)
PCO2 BLDMV: 43 MM HG (ref 41–51)
PCO2 BLDMV: 48 MM HG (ref 41–51)
PCO2 BLDMV: 48 MM HG (ref 41–51)
PCO2 BLDMV: 49 MM HG (ref 41–51)
PH BLDA: 7.48 PH (ref 7.38–7.42)
PH BLDMV: 7.43 PH (ref 7.33–7.43)
PH BLDMV: 7.44 PH (ref 7.33–7.43)
PH BLDMV: 7.45 PH (ref 7.33–7.43)
PH BLDMV: 7.46 PH (ref 7.33–7.43)
PHOSPHATE SERPL-MCNC: 3.9 MG/DL (ref 2.5–4.9)
PHOSPHATE SERPL-MCNC: 4.5 MG/DL (ref 2.5–4.9)
PLATELET # BLD AUTO: 129 X10*3/UL (ref 150–450)
PO2 BLDA: 62 MM HG (ref 85–95)
PO2 BLDMV: 28 MM HG (ref 35–45)
PO2 BLDMV: 29 MM HG (ref 35–45)
PO2 BLDMV: 33 MM HG (ref 35–45)
PO2 BLDMV: 38 MM HG (ref 35–45)
POTASSIUM BLDA-SCNC: 4.1 MMOL/L (ref 3.5–5.3)
POTASSIUM BLDMV-SCNC: 3.8 MMOL/L (ref 3.5–5.3)
POTASSIUM BLDMV-SCNC: 4.1 MMOL/L (ref 3.5–5.3)
POTASSIUM BLDMV-SCNC: 4.2 MMOL/L (ref 3.5–5.3)
POTASSIUM BLDMV-SCNC: 4.4 MMOL/L (ref 3.5–5.3)
POTASSIUM SERPL-SCNC: 4 MMOL/L (ref 3.5–5.3)
POTASSIUM SERPL-SCNC: 4.3 MMOL/L (ref 3.5–5.3)
PROT SERPL-MCNC: 4.9 G/DL (ref 6.4–8.2)
Q ONSET: 216 MS
Q ONSET: 217 MS
QRS COUNT: 18 BEATS
QRS COUNT: 20 BEATS
QRS DURATION: 114 MS
QRS DURATION: 116 MS
QT INTERVAL: 318 MS
QT INTERVAL: 324 MS
QTC CALCULATION(BAZETT): 454 MS
QTC CALCULATION(BAZETT): 455 MS
QTC FREDERICIA: 403 MS
QTC FREDERICIA: 406 MS
R AXIS: 35 DEGREES
R AXIS: 36 DEGREES
RBC # BLD AUTO: 2.69 X10*6/UL (ref 4.5–5.9)
SAO2 % BLDA: 93 % (ref 94–100)
SAO2 % BLDMV: 44 % (ref 45–75)
SAO2 % BLDMV: 54 % (ref 45–75)
SAO2 % BLDMV: 55 % (ref 45–75)
SAO2 % BLDMV: 60 % (ref 45–75)
SODIUM BLDA-SCNC: 133 MMOL/L (ref 136–145)
SODIUM BLDMV-SCNC: 133 MMOL/L (ref 136–145)
SODIUM BLDMV-SCNC: 133 MMOL/L (ref 136–145)
SODIUM BLDMV-SCNC: 134 MMOL/L (ref 136–145)
SODIUM BLDMV-SCNC: 136 MMOL/L (ref 136–145)
SODIUM SERPL-SCNC: 135 MMOL/L (ref 136–145)
SODIUM SERPL-SCNC: 138 MMOL/L (ref 136–145)
STAPHYLOCOCCUS SPEC CULT: NORMAL
T AXIS: 169 DEGREES
T AXIS: 172 DEGREES
T OFFSET: 376 MS
T OFFSET: 378 MS
UFH PPP CHRO-ACNC: 0.6 IU/ML
VANCOMYCIN SERPL-MCNC: 15.6 UG/ML (ref 5–20)
VENTRICULAR RATE: 118 BPM
VENTRICULAR RATE: 123 BPM
WBC # BLD AUTO: 14.5 X10*3/UL (ref 4.4–11.3)

## 2024-02-22 PROCEDURE — 83735 ASSAY OF MAGNESIUM: CPT | Performed by: STUDENT IN AN ORGANIZED HEALTH CARE EDUCATION/TRAINING PROGRAM

## 2024-02-22 PROCEDURE — 71045 X-RAY EXAM CHEST 1 VIEW: CPT

## 2024-02-22 PROCEDURE — 2720000007 HC OR 272 NO HCPCS: Performed by: INTERNAL MEDICINE

## 2024-02-22 PROCEDURE — 2500000001 HC RX 250 WO HCPCS SELF ADMINISTERED DRUGS (ALT 637 FOR MEDICARE OP)

## 2024-02-22 PROCEDURE — C1874 STENT, COATED/COV W/DEL SYS: HCPCS | Performed by: INTERNAL MEDICINE

## 2024-02-22 PROCEDURE — 2500000004 HC RX 250 GENERAL PHARMACY W/ HCPCS (ALT 636 FOR OP/ED): Performed by: STUDENT IN AN ORGANIZED HEALTH CARE EDUCATION/TRAINING PROGRAM

## 2024-02-22 PROCEDURE — C1725 CATH, TRANSLUMIN NON-LASER: HCPCS | Performed by: INTERNAL MEDICINE

## 2024-02-22 PROCEDURE — 85347 COAGULATION TIME ACTIVATED: CPT | Performed by: INTERNAL MEDICINE

## 2024-02-22 PROCEDURE — 93005 ELECTROCARDIOGRAM TRACING: CPT

## 2024-02-22 PROCEDURE — 84132 ASSAY OF SERUM POTASSIUM: CPT | Performed by: STUDENT IN AN ORGANIZED HEALTH CARE EDUCATION/TRAINING PROGRAM

## 2024-02-22 PROCEDURE — 2500000005 HC RX 250 GENERAL PHARMACY W/O HCPCS: Performed by: INTERNAL MEDICINE

## 2024-02-22 PROCEDURE — 99153 MOD SED SAME PHYS/QHP EA: CPT | Performed by: INTERNAL MEDICINE

## 2024-02-22 PROCEDURE — 80202 ASSAY OF VANCOMYCIN: CPT | Performed by: INTERNAL MEDICINE

## 2024-02-22 PROCEDURE — 92978 ENDOLUMINL IVUS OCT C 1ST: CPT | Performed by: INTERNAL MEDICINE

## 2024-02-22 PROCEDURE — 92928 PRQ TCAT PLMT NTRAC ST 1 LES: CPT | Performed by: INTERNAL MEDICINE

## 2024-02-22 PROCEDURE — 83615 LACTATE (LD) (LDH) ENZYME: CPT | Performed by: STUDENT IN AN ORGANIZED HEALTH CARE EDUCATION/TRAINING PROGRAM

## 2024-02-22 PROCEDURE — 2780000003 HC OR 278 NO HCPCS: Performed by: INTERNAL MEDICINE

## 2024-02-22 PROCEDURE — 2500000004 HC RX 250 GENERAL PHARMACY W/ HCPCS (ALT 636 FOR OP/ED)

## 2024-02-22 PROCEDURE — 99291 CRITICAL CARE FIRST HOUR: CPT | Performed by: STUDENT IN AN ORGANIZED HEALTH CARE EDUCATION/TRAINING PROGRAM

## 2024-02-22 PROCEDURE — 99152 MOD SED SAME PHYS/QHP 5/>YRS: CPT | Performed by: INTERNAL MEDICINE

## 2024-02-22 PROCEDURE — C1769 GUIDE WIRE: HCPCS | Performed by: INTERNAL MEDICINE

## 2024-02-22 PROCEDURE — 82248 BILIRUBIN DIRECT: CPT | Performed by: STUDENT IN AN ORGANIZED HEALTH CARE EDUCATION/TRAINING PROGRAM

## 2024-02-22 PROCEDURE — C1887 CATHETER, GUIDING: HCPCS | Performed by: INTERNAL MEDICINE

## 2024-02-22 PROCEDURE — 2550000001 HC RX 255 CONTRASTS: Performed by: INTERNAL MEDICINE

## 2024-02-22 PROCEDURE — C1760 CLOSURE DEV, VASC: HCPCS | Performed by: INTERNAL MEDICINE

## 2024-02-22 PROCEDURE — 37799 UNLISTED PX VASCULAR SURGERY: CPT | Performed by: STUDENT IN AN ORGANIZED HEALTH CARE EDUCATION/TRAINING PROGRAM

## 2024-02-22 PROCEDURE — C9600 PERC DRUG-EL COR STENT SING: HCPCS | Performed by: INTERNAL MEDICINE

## 2024-02-22 PROCEDURE — 80069 RENAL FUNCTION PANEL: CPT | Mod: CCI

## 2024-02-22 PROCEDURE — C1894 INTRO/SHEATH, NON-LASER: HCPCS | Performed by: INTERNAL MEDICINE

## 2024-02-22 PROCEDURE — 92979 ENDOLUMINL IVUS OCT C EA: CPT | Performed by: INTERNAL MEDICINE

## 2024-02-22 PROCEDURE — 2500000001 HC RX 250 WO HCPCS SELF ADMINISTERED DRUGS (ALT 637 FOR MEDICARE OP): Performed by: STUDENT IN AN ORGANIZED HEALTH CARE EDUCATION/TRAINING PROGRAM

## 2024-02-22 PROCEDURE — C1753 CATH, INTRAVAS ULTRASOUND: HCPCS | Performed by: INTERNAL MEDICINE

## 2024-02-22 PROCEDURE — 2500000002 HC RX 250 W HCPCS SELF ADMINISTERED DRUGS (ALT 637 FOR MEDICARE OP, ALT 636 FOR OP/ED)

## 2024-02-22 PROCEDURE — 2500000004 HC RX 250 GENERAL PHARMACY W/ HCPCS (ALT 636 FOR OP/ED): Performed by: INTERNAL MEDICINE

## 2024-02-22 PROCEDURE — 84100 ASSAY OF PHOSPHORUS: CPT | Performed by: STUDENT IN AN ORGANIZED HEALTH CARE EDUCATION/TRAINING PROGRAM

## 2024-02-22 PROCEDURE — 85384 FIBRINOGEN ACTIVITY: CPT | Performed by: STUDENT IN AN ORGANIZED HEALTH CARE EDUCATION/TRAINING PROGRAM

## 2024-02-22 PROCEDURE — 2020000001 HC ICU ROOM DAILY

## 2024-02-22 PROCEDURE — 85025 COMPLETE CBC W/AUTO DIFF WBC: CPT | Performed by: STUDENT IN AN ORGANIZED HEALTH CARE EDUCATION/TRAINING PROGRAM

## 2024-02-22 PROCEDURE — 85347 COAGULATION TIME ACTIVATED: CPT

## 2024-02-22 PROCEDURE — 99232 SBSQ HOSP IP/OBS MODERATE 35: CPT

## 2024-02-22 PROCEDURE — 37799 UNLISTED PX VASCULAR SURGERY: CPT

## 2024-02-22 PROCEDURE — 71045 X-RAY EXAM CHEST 1 VIEW: CPT | Performed by: RADIOLOGY

## 2024-02-22 PROCEDURE — 85520 HEPARIN ASSAY: CPT

## 2024-02-22 DEVICE — STENT ONYXNG25038UX ONYX 2.50X38RX
Type: IMPLANTABLE DEVICE | Site: HEART | Status: FUNCTIONAL
Brand: ONYX FRONTIER™

## 2024-02-22 DEVICE — STENT ONYXNG35038UX ONYX 3.50X38RX
Type: IMPLANTABLE DEVICE | Site: HEART | Status: FUNCTIONAL
Brand: ONYX FRONTIER™

## 2024-02-22 RX ORDER — SODIUM CHLORIDE 9 MG/ML
INJECTION, SOLUTION INTRAVENOUS CONTINUOUS PRN
Status: COMPLETED | OUTPATIENT
Start: 2024-02-22 | End: 2024-02-22

## 2024-02-22 RX ORDER — HEPARIN SODIUM 1000 [USP'U]/ML
INJECTION, SOLUTION INTRAVENOUS; SUBCUTANEOUS AS NEEDED
Status: DISCONTINUED | OUTPATIENT
Start: 2024-02-22 | End: 2024-02-22 | Stop reason: HOSPADM

## 2024-02-22 RX ORDER — MIDAZOLAM HYDROCHLORIDE 1 MG/ML
INJECTION INTRAMUSCULAR; INTRAVENOUS AS NEEDED
Status: DISCONTINUED | OUTPATIENT
Start: 2024-02-22 | End: 2024-02-22 | Stop reason: HOSPADM

## 2024-02-22 RX ORDER — FUROSEMIDE 10 MG/ML
80 INJECTION INTRAMUSCULAR; INTRAVENOUS ONCE
Status: COMPLETED | OUTPATIENT
Start: 2024-02-22 | End: 2024-02-22

## 2024-02-22 RX ORDER — NITROGLYCERIN 5 MG/ML
INJECTION, SOLUTION INTRAVENOUS AS NEEDED
Status: DISCONTINUED | OUTPATIENT
Start: 2024-02-22 | End: 2024-02-22 | Stop reason: HOSPADM

## 2024-02-22 RX ORDER — TALC
9 POWDER (GRAM) TOPICAL NIGHTLY PRN
Status: DISCONTINUED | OUTPATIENT
Start: 2024-02-22 | End: 2024-02-26 | Stop reason: HOSPADM

## 2024-02-22 RX ORDER — FUROSEMIDE 10 MG/ML
80 INJECTION INTRAMUSCULAR; INTRAVENOUS ONCE
Status: DISCONTINUED | OUTPATIENT
Start: 2024-02-22 | End: 2024-02-22

## 2024-02-22 RX ORDER — LIDOCAINE HYDROCHLORIDE 20 MG/ML
INJECTION, SOLUTION INFILTRATION; PERINEURAL AS NEEDED
Status: DISCONTINUED | OUTPATIENT
Start: 2024-02-22 | End: 2024-02-22 | Stop reason: HOSPADM

## 2024-02-22 RX ORDER — BUMETANIDE 0.25 MG/ML
4 INJECTION INTRAMUSCULAR; INTRAVENOUS ONCE
Status: COMPLETED | OUTPATIENT
Start: 2024-02-22 | End: 2024-02-22

## 2024-02-22 RX ORDER — FENTANYL CITRATE 50 UG/ML
INJECTION, SOLUTION INTRAMUSCULAR; INTRAVENOUS AS NEEDED
Status: DISCONTINUED | OUTPATIENT
Start: 2024-02-22 | End: 2024-02-22 | Stop reason: HOSPADM

## 2024-02-22 RX ADMIN — PANTOPRAZOLE SODIUM 40 MG: 40 TABLET, DELAYED RELEASE ORAL at 07:54

## 2024-02-22 RX ADMIN — HEPARIN SODIUM AND DEXTROSE 1600 UNITS/HR: 10000; 5 INJECTION INTRAVENOUS at 06:33

## 2024-02-22 RX ADMIN — HEPARIN SODIUM AND DEXTROSE 1600 UNITS/HR: 10000; 5 INJECTION INTRAVENOUS at 22:36

## 2024-02-22 RX ADMIN — BUMETANIDE 4 MG: 0.25 INJECTION, SOLUTION INTRAMUSCULAR; INTRAVENOUS at 02:22

## 2024-02-22 RX ADMIN — FUROSEMIDE 80 MG: 10 INJECTION, SOLUTION INTRAMUSCULAR; INTRAVENOUS at 10:42

## 2024-02-22 RX ADMIN — ASPIRIN 81 MG: 81 TABLET, COATED ORAL at 09:17

## 2024-02-22 RX ADMIN — PIPERACILLIN SODIUM AND TAZOBACTAM SODIUM 2.25 G: 2; .25 INJECTION, SOLUTION INTRAVENOUS at 20:29

## 2024-02-22 RX ADMIN — FUROSEMIDE 80 MG: 10 INJECTION, SOLUTION INTRAMUSCULAR; INTRAVENOUS at 20:29

## 2024-02-22 RX ADMIN — VANCOMYCIN HYDROCHLORIDE 2000 MG: 5 INJECTION, POWDER, LYOPHILIZED, FOR SOLUTION INTRAVENOUS at 09:17

## 2024-02-22 RX ADMIN — SENNOSIDES AND DOCUSATE SODIUM 2 TABLET: 8.6; 5 TABLET ORAL at 09:17

## 2024-02-22 RX ADMIN — PIPERACILLIN SODIUM AND TAZOBACTAM SODIUM 2.25 G: 2; .25 INJECTION, SOLUTION INTRAVENOUS at 16:01

## 2024-02-22 RX ADMIN — PIPERACILLIN SODIUM AND TAZOBACTAM SODIUM 2.25 G: 2; .25 INJECTION, SOLUTION INTRAVENOUS at 02:22

## 2024-02-22 RX ADMIN — MELATONIN 9 MG: 3 TAB ORAL at 22:36

## 2024-02-22 RX ADMIN — PIPERACILLIN SODIUM AND TAZOBACTAM SODIUM 2.25 G: 2; .25 INJECTION, SOLUTION INTRAVENOUS at 07:54

## 2024-02-22 RX ADMIN — CLOPIDOGREL BISULFATE 75 MG: 75 TABLET ORAL at 09:17

## 2024-02-22 ASSESSMENT — PAIN - FUNCTIONAL ASSESSMENT
PAIN_FUNCTIONAL_ASSESSMENT: 0-10

## 2024-02-22 ASSESSMENT — PAIN SCALES - GENERAL
PAINLEVEL_OUTOF10: 0 - NO PAIN

## 2024-02-22 NOTE — PROGRESS NOTES
Subjective Data:  - IABP appeared low on am CXR, repositioned by CICU fellow, repeat CXR satisfactory placement  - pending PCI this afternoon       Objective Data:  Last Recorded Vitals:  Vitals:    02/22/24 0600 02/22/24 0607 02/22/24 0700 02/22/24 0800   BP:       BP Location:       Pulse: 108  100 103   Resp: (!) 28  22 25   Temp:    36.6 °C (97.9 °F)   TempSrc:    Core   SpO2: 97%  96% 96%   Weight:  113 kg (248 lb 0.3 oz)     Height:           Last Labs:  CBC - 2/22/2024:  4:25 AM  14.5 9.4 129    26.6      CMP - 2/22/2024:  4:25 AM  8.1 4.9 61 --- 1.0   4.5 2.7 179 61      PTT - 2/19/2024: 10:26 PM  1.2   13.5 >200     TROPHS   Date/Time Value Ref Range Status   02/19/2024 12:02 PM 3,463 0 - 53 ng/L Final     BNP   Date/Time Value Ref Range Status   02/19/2024 12:02 PM 1,905 0 - 99 pg/mL Final     HGBA1C   Date/Time Value Ref Range Status   02/19/2024 12:02 PM 5.4 see below % Final   07/09/2021 09:12 AM 5.2 % Final     Comment:          Diagnosis of Diabetes-Adults   Non-Diabetic: < or = 5.6%   Increased risk for developing diabetes: 5.7-6.4%   Diagnostic of diabetes: > or = 6.5%  .       Monitoring of Diabetes                Age (y)     Therapeutic Goal (%)   Adults:          >18           <7.0   Pediatrics:    13-18           <7.5                   7-12           <8.0                   0- 6            7.5-8.5   American Diabetes Association. Diabetes Care 33(S1), Jan 2010.       LDLCALC   Date/Time Value Ref Range Status   12/15/2023 10:28 AM 82 <=99 mg/dL Final     Comment:                                 Near   Borderline      AGE      Desirable  Optimal    High     High     Very High     0-19 Y     0 - 109     ---    110-129   >/= 130     ----    20-24 Y     0 - 119     ---    120-159   >/= 160     ----      >24 Y     0 -  99   100-129  130-159   160-189     >/=190     05/09/2020 04:24  65 - 130 MG/DL Final     VLDL   Date/Time Value Ref Range Status   12/15/2023 10:28 AM 17 0 - 40 mg/dL Final    09/20/2023 09:51 AM 14 0 - 40 mg/dL Final   06/30/2022 09:30 AM 19 0 - 40 mg/dL Final   01/18/2022 12:50 PM 21 0 - 40 mg/dL Final      Last I/O:  I/O last 3 completed shifts:  In: 1915 (17 mL/kg) [I.V.:1315 (11.7 mL/kg); IV Piggyback:600]  Out: 4125 (36.7 mL/kg) [Urine:4125 (1 mL/kg/hr)]  Weight: 112.5 kg     Inpatient Medications:  Scheduled medications   Medication Dose Route Frequency    aspirin  81 mg oral Daily    clopidogrel  75 mg oral Daily    pantoprazole  40 mg oral Daily before breakfast    piperacillin-tazobactam  2.25 g intravenous q6h    polyethylene glycol  17 g oral Daily    sennosides-docusate sodium  2 tablet oral BID    vancomycin  2,000 mg intravenous q24h     PRN medications   Medication    acetaminophen    dextrose 10 % in water (D10W)    dextrose    glucagon    heparin    oxyCODONE    oxygen     Continuous Medications   Medication Dose Last Rate    heparin  0-4,500 Units/hr 1,600 Units/hr (02/22/24 0800)    norepinephrine  0.01-1 mcg/kg/min 0.14 mcg/kg/min (02/22/24 0800)     Physical Exam  Constitutional:       General: He is sleeping.      Appearance: He is ill-appearing.   Cardiovascular:      Rate and Rhythm: Tachycardia present. Rhythm irregular.      Pulses:           Dorsalis pedis pulses are detected w/ Doppler on the right side and detected w/ Doppler on the left side.      Comments: LCFA IABP in place, 1:1; site intact, no oozing or hematoma  Pulmonary:      Breath sounds: Rhonchi present.   Skin:     Findings: Bruising present.   Neurological:      Mental Status: He is easily aroused.       Assessment/Plan   Tesfaye Milton is a 78 y.o. male with PMH of CAD s/p multiple stents with known PCI to LAD and LCx (2011), Afib failed multiple DCCV, ablation and antiarrythmic therapies on Xarelto and rate control strategy, HFrEF (20-25% LVEF), mod AS, HTN, prostate cancer s/p brachytherapy (2022) who presents as a transfer from [facility] in cardiogenic shock.     2/19: LCFA IABP  placed    2/20: IABP 1:1, augmentation 120. Checked positioning on CXR, confirmed with IC fellow is in stable position. remains on vaso, norepi; atrial fibrillation with RVR noted on tele    2/21: IABP 1:1 assisted BP on console 59/48 (60), augmentation 76. Checked positioning on CXR, appropriate placement. Remains on vasopressors. Access site reinforced, with pressure tap, no hematoma/bruising noted.     2/22: - IABP appeared low on am CXR, repositioned by CICU fellow, repeat CXR satisfactory placement  - pending PCI this afternoon     Cardiogenic shock   Severe triple vessel disease  HFrEF 20-25%  - [facility] Cath: 2/19: severe pLAD lesion, as well as critical Lcx and RCA disease  - Intra- aortic balloon pump placed on 2/19 after transfer from [facility]  - 2/22: - IABP appeared low on am CXR, repositioned by CICU fellow, repeat CXR satisfactory placement  - TTE 2/13: LV EF 20-25%, multiple segmental abnormalities; impaired relaxation pattern of LV diastolic filling, moderate MR   - Current settings: 1:1 assisted BP 85/56 (81) Augmentation 109  - Plan for weaning as hemodynamically tolerated    Recommendations:  - daily CXR with IABP  - please maintain strict bedrest while IABP in place  - closely monitor groin insertion site and distal pulses  - May elevate HOB no greater than 30 degrees  - May log roll patient side to side without flexing involved extremity  - Interventional cardiology will continue to follow, will defer primary care to CICU team     Code Status:  Full Code      Gasper Pena, APRN-CNP, DNP

## 2024-02-22 NOTE — PROGRESS NOTES
Pharmacy to Dose Vancomycin:  Tesfaye Milton is a 78 y.o. male ordered pharmacy to dose vancomycin for pneumonia. Today is day 2 of therapy.  Goal: Trough 15-20mg/L  Results from last 7 days   Lab Units 02/22/24  0425 02/21/24  1726 02/21/24  0125 02/20/24  2310   BUN mg/dL 80* 80* 84*  --    CREATININE mg/dL 2.33* 2.38* 2.92*  --    WBC AUTO x10*3/uL 14.5*  --  13.7* 14.1*   VANCOMYCIN RM ug/mL 15.6  --   --   --       Staph/MRSA Screen Culture   Date/Time Value Ref Range Status   02/16/2024 10:21 AM No Staphylococcus aureus isolated  Final     Urine Culture   Date/Time Value Ref Range Status   02/13/2024 03:59 PM No growth  Final     Blood Culture   Date/Time Value Ref Range Status   02/19/2024 09:45 PM No growth at 2 days  Preliminary   02/19/2024 09:45 PM No growth at 2 days  Preliminary      Renal function is improving. CRRT discontinued and per nephrology no renal replacement therapy indicated at this time. Level this AM 15.6 drawn 4.5hrs post dose.     Plan:  Adjust to vanco [id number] d/t discontinuation of CRRT.  Follow-up level ordered for 2/24 AM unless clinically indicated sooner.  Pharmacy will continue daily vancomycin monitoring. Please contact the pharmacy at extension [phone] with any questions.    Thank you,  Barber Jones [facility]

## 2024-02-22 NOTE — CARE PLAN
The patient's goals for the shift include      The clinical goals for the shift include Pt maps will remain greater than 55 throughout the shift.

## 2024-02-22 NOTE — CONSULTS
Reason For Consult  Lopez placement    History Of Present Illness  Tesfaye Milton is a 78 y.o. male presenting with PMH of CAD s/p multiple stents with known PCI to LAD and LCx (2011), Afib failed multiple DCCV, ablation and antiarrythmic therapies on Xarelto and rate control strategy, HFrEF (20-25% LVEF), mod AS, HTN, prostate cancer s/p brachytherapy (2022) who presents as a transfer from [facility] in cardiogenic shock. Patient pulled out lopez overnight and now has hematuria in the setting of therapeutic heparin. Urology consulted for lopez placement.     The patient follows with urologist Dr. Roberts at [facility]. History of T2 prostate cancer in active survelliance s/p brachytherapy in 9/22. Patient takes myrbetriq to reduce frequency/urgency. Cystoscopy was recently performed on 1/30/24 at [facility] and demonstrated normal urethra, bladder, UO with no tumor, but a stone was noted in the anterior prostate.     The patient was AxO3 during evaluation. Confirms urological history. Endorses urgency prior to admission but denies all other voiding symptoms.      Past Medical History  He has a past medical history of Anxiety disorder, unspecified, Atrial fibrillation (CMS/HCC), CAD (coronary artery disease), CHF (congestive heart failure) (CMS/HCC), High cholesterol, Hypertension, Long term (current) use of anticoagulants, Other conditions influencing health status, Personal history of other diseases of male genital organs, Personal history of other diseases of the circulatory system (07/26/2013), Personal history of other endocrine, nutritional and metabolic disease (07/26/2013), and Prostate disorder.    Surgical History  He has a past surgical history that includes Umbilical hernia repair (04/08/2013); Other surgical history (04/08/2013); Appendectomy (04/08/2013); Knee arthroscopy w/ debridement (04/08/2013); Cardiac catheterization (N/A, 2/19/2024); and Cardiac catheterization (N/A, 2/19/2024).     Social History  He reports  "that he has quit smoking. His smoking use included cigarettes. He smoked an average of .25 packs per day. He has never used smokeless tobacco. He reports that he does not currently use alcohol after a past usage of about 7.0 standard drinks of alcohol per week. He reports that he does not use drugs.    Family History  Family History   Problem Relation Name Age of Onset    Stroke Mother      Dementia Mother      Stroke Father          Allergies  Patient has no known allergies.    Review of Systems    All other ROS negative expect those mentioned in HPI.      Physical Exam  General: Laying in bed. NAD.   Eyes: EOMI  ENMT: no apparent injury, no lesions seen, MMM  Head/neck: NCAT  Cardiac: regular rate in chart  Pulm: normal respiratory effort on NC  GI: soft, NT/ND, no masses palpated  : uncircumcised phallus with blood clot at the meatus  Skin: warm, dry, no lesions noted  Neuro: AOx3  Psych: appropriate mood and behavior      Last Recorded Vitals  Blood pressure 95/59, pulse 110, temperature 36 °C (96.8 °F), resp. rate 23, height 1.905 m (6' 3\"), weight 113 kg (248 lb 0.3 oz), SpO2 94 %.       Assessment/Plan     Tesfaye Milton is a 78 y.o. male presenting with PMH of CAD s/p multiple stents with known PCI to LAD and LCx (2011), Afib failed multiple DCCV, ablation and antiarrythmic therapies on Xarelto and rate control strategy, HFrEF (20-25% LVEF), mod AS, HTN, prostate cancer s/p brachytherapy (2022) who presents as a transfer from [facility] in cardiogenic shock. Patient pulled out lopez overnight and now has hematuria in the setting of therapeutic heparin. Urology consulted for lopez placement.     Procedure note:    The urethra was prepped and draped in the usual sterile fashion.  Lubricating jelly was injected into the urethra. 22 [place] coude hematuria lopez catheter inserted with ease. Balloon inflated with 10cc's of sterile water. Return of clear pale red urine upon insertion of catheter into bladder. " Patient tolerated the procedure well. Catheter connected to sterile tubing and collection bag and positioned to over side gravity drainage. No clots noted.     Recommendations:  - Maintain lopez catheter for at least 72 hours, after that lopez removal per primary  - Future lopez catheters do not need urology placement  - RN to manually irrigate bladder with normal saline PRN using a piston syringe through the main drainage port of the catheter. RN to also manually irrigate PRN for clots or worsening suprapubic pressure with low UOP.       1. Instill 120 cc NS       2. Aspirate 60 cc. Waste this 60 cc.       3. Instill 60 cc. Aspirate 60 cc. Waste.       4. Repeat step #3 at least 2x, until no more clots are returned and the urine is lighter. Please note that this may take several syringes.   - Hematuria likely d/t traumatic pull, patient recently had cysto for microhematuria with urology  - Please page urology with any further questions    Discussed with chief resident, Dr. Abdiaziz Wheatley MD

## 2024-02-22 NOTE — INTERVAL H&P NOTE
H&P reviewed. Patient now a patient a [facility] in CICU with IABP. The patient was examined and there are no changes to the H&P.

## 2024-02-22 NOTE — CARE PLAN
Problem: Safety - Medical Restraint  Goal: Remains free of injury from restraints (Restraint for Interference with Medical Device)  Flowsheets (Taken 2/22/2024 9232)  Remains free of injury from restraints (restraint for interference with medical device):   Determine that other, less restrictive measures have been tried or would not be effective before applying the restraint   Evaluate the patient's condition at the time of restraint application   Inform patient/family regarding the reason for restraint   Every 2 hours: Monitor safety, psychosocial status, comfort, nutrition and hydration      Over the shift, the patient did not make progress toward the following goals. Barriers to progression include pt becoming more confused throughout the day.

## 2024-02-22 NOTE — PROGRESS NOTES
Nephrology Progress Note   [facility]  February 22, 2024    Patient: Tesfaye Milton    Medical Record: [id number]    Subjective   Patient seen at bedside this morning.  Reports that he is uncomfortable with laying down but no new changes. Given Bumex 4mg overnight with 900cc output, lopez placed. This AM [place] numbers: Wedge 18, CVP 6, CO/CI 6/2.6, , Svo2 60%,. On levo 0.14 mcg.     Medications   Medications:   Scheduled medications  aspirin, 81 mg, oral, Daily  clopidogrel, 75 mg, oral, Daily  pantoprazole, 40 mg, oral, Daily before breakfast  piperacillin-tazobactam, 2.25 g, intravenous, q6h  polyethylene glycol, 17 g, oral, Daily  sennosides-docusate sodium, 2 tablet, oral, BID  vancomycin, 2,000 mg, intravenous, q24h      Continuous medications  heparin, 0-4,500 Units/hr, Last Rate: 1,600 Units/hr (02/22/24 1200)  norepinephrine, 0.01-1 mcg/kg/min, Last Rate: 0.08 mcg/kg/min (02/22/24 1200)      PRN medications  PRN medications: acetaminophen, dextrose 10 % in water (D10W), dextrose, glucagon, heparin, oxyCODONE, oxygen     Objective   Vitals  Visit Vitals  BP 95/59   Pulse 105   Temp 36.8 °C (98.2 °F)   Resp 25        Intake/Output  I/O last 3 completed shifts:  In: 1915 (17 mL/kg) [I.V.:1315 (11.7 mL/kg); IV Piggyback:600]  Out: 4125 (36.7 mL/kg) [Urine:4125 (1 mL/kg/hr)]  Weight: 112.5 kg     Physical Exam  Constitutional:       Appearance: Normal appearance.   Eyes:      Extraocular Movements: Extraocular movements intact.      Conjunctiva/sclera: Conjunctivae normal.   Neck:      Comments: [place] in left IJ  Cardiovascular:      Rate and Rhythm: Normal rate and regular rhythm.   Pulmonary:      Effort: Pulmonary effort is normal.      Breath sounds: Normal breath sounds.   Abdominal:      General: Abdomen is flat. There is no distension.      Palpations: Abdomen is soft.      Tenderness: There is no abdominal tenderness.   Musculoskeletal:      Right lower leg: No edema.       Left lower leg: No edema.   Neurological:      Mental Status: He is alert and oriented to person, place, and time.   Psychiatric:         Mood and Affect: Mood normal.         Behavior: Behavior normal.     Labs  Results from last 7 days   Lab Units 02/22/24  0425 02/21/24  0125 02/20/24  2310   WBC AUTO x10*3/uL 14.5* 13.7* 14.1*   HEMOGLOBIN g/dL 9.4* 10.3* 10.9*   HEMATOCRIT % 26.6* 29.6* 31.3*   PLATELETS AUTO x10*3/uL 129* 148* 152       Results from last 7 days   Lab Units 02/22/24  0425 02/21/24  1726 02/21/24  0125   SODIUM mmol/L 135* 135* 133*   POTASSIUM mmol/L 4.3 4.4 4.4   CO2 mmol/L 27 32 26   ANION GAP mmol/L 14 8* 15   BUN mg/dL 80* 80* 84*   CREATININE mg/dL 2.33* 2.38* 2.92*   GLUCOSE mg/dL 118* 109*  --    EGFR mL/min/1.73m*2 28* 28* 22*   MAGNESIUM mg/dL 2.37 2.59* 2.85*   PHOSPHORUS mg/dL 4.5 4.8 6.6*        Results from last 7 days   Lab Units 02/22/24  0425 02/21/24  0125 02/20/24  0039   ALT U/L 179* 238* 322*   AST U/L 61* 77* 110*   ALK PHOS U/L 61 61 71        Results from last 7 days   Lab Units 02/19/24  2226 02/19/24  1202   INR  1.2* 1.0       Results from last 7 days   Lab Units 02/22/24  0936 02/22/24  0641 02/21/24  2331 02/21/24  1152 02/21/24  1009 02/19/24  1704 02/19/24  1204 02/19/24  1202 02/17/24  2241   POCT PH, ARTERIAL pH  --   --   --   --  7.48*  --  7.37*  --  7.52*   POCT PO2, ARTERIAL mm Hg  --   --   --   --  120*  --  117*  --  114*   POCT PCO2, ARTERIAL mm Hg  --   --   --   --  34*  --  42  --  36*   FIO2 % 32 21 21   < > 32   < > 33   < > 30    < > = values in this interval not displayed.       Results from last 7 days   Lab Units 02/19/24  1912   POCT PH, VENOUS pH 7.37   POCT PCO2, VENOUS mm Hg 46       Results from last 7 days   Lab Units 02/19/24  1202   LACTATE mmol/L 1.8           Imaging  === 02/19/24 ===    XR CHEST 1 VIEW    - Impression -  1.  Interval placement of left Trialysis line with tip overlying the  mid SVC.  2. No change or interval  improvement in interstitial pulmonary edema  and patchy bilateral infiltrates.  3.  Additional medical devices as described above.    I personally reviewed the images/study and I agree with Ria Asencio DO's (radiology resident) findings as stated. This study  was interpreted at [facility],  [place], [place].    MACRO:  None    Signed by: Piotr Cramer 2/20/2024 10:02 AM  Dictation workstation:   [id number]   === 10/28/21 ===    CT CHEST SCREENING FOR LUNG CANCER    - Impression -  1. Small stable nodularities bilaterally. These may be related to scarring.  No new or enlarging nodules.  2. Severe atherosclerotic coronary calcifications and cardiomegaly  3. Mildly dilated main pulmonary artery similar to the prior exam can be  seen with pulmonary arterial hypertension..    RECOMMENDATIONS: Continued annual low dose CTis recommended.  Category: Lung-RADS Category 2 --  Nodules with a very low likelihood of  becoming a clinically active cancer due to size or lack of growth.  Continue  annual screening with LDCT in 12 months.      Recommendations above based on NCCN guidelines Version 1.2021 for lung  cancer screening.    NOTE:  Nodule size measurements are mean diameters, the average of the  longest diameter of the nodule and its perpendicular diameter.      [id number]    This report has been produced using speech recognition.  This exam is available in DICOM format to non-affiliated healthcare  facilities on a secure media free searchable basis with prior patient  authorization.  The patient exposure is reported to a radiation dose index  registry.  All CT examinations are performed with one or more of the  following dose reduction techniques: Automated Exposure Control, Adjustment  of mA and/or KV according to patient size, or use of iterative  reconstruction techniques.    Original Interpreting Physician:   MYLES GARCIA MD  Original Transcribed by/Date: [facility]   Oct 28  2021  2:50P  Original Electronically Signed by/Date: MYLES GARCIA MD Oct 28 2021  2:50P    Addendum Interpreting Physician:  Addendum Transcribed by/Date: NO ADDENDUM  Addendum Electronically Signed by/Date:       Assessment/Plan   Tesfaye Milton is a 78 y.o. male with PMH of CAD s/p multiple stents with known PCI to LAD and LCx (2011), Afib failed multiple DCCV, ablation and antiarrythmic therapies on Xarelto and rate control strategy, HFrEF (20-25% LVEF), mod AS, HTN, prostate cancer s/p brachytherapy (2022) who presents as a transfer from [facility] in cardiogenic shock. Nephrology consulted for anuria and JEFF. Was not making urine since 2/19 6 pm (the time he arrived at ). Recommended lopez catheter placement, BMP BID and close monitoring for CRRT with potential to start within 24h. Etiology of worsening kidney function is multifactorial; CRS, cardiogenic shock, SHELLIE, Zosyn usage. Patient currently with hemodynamic monitoring with RHC and on norepinephrine, vaso stopped 2/20 AM. No signs of significant volume overload on exam. Currently on 3L NC. Recommend to reduce intake of fluids as much as possible, prefer using isotonic solutions if giving fluids, No urgent indications for RRT at this time, but will continue close monitoring of hemodynamics and worsening O2 requirement which may necessitate earlier RRT. Had brief initation of CVVH night of 2/20 for c/f volume overlaod, however started making urine shortly after. CVVH stopped. Patient with improved UOP 2/21 of 1646cc. Having good UOP with diuresis.      #JEFF Stage III, non-oliguric likely 2/2 ischemic tubular injury in the setting of cardiogenic shock   - Baseline creatinine: 0.9-1.0  - Electrolytes (Na, K, Ca, Phos): stable  - Discontinued levo morning of 2/21, restarted later in morning for MAPs of 50s; on norepinephrine 0.1   - Creatinine trends: 2.9>4.3>2.5>1.6>1.0>2.51 >2.84>3.09>2.92>2.33    - UOP 2925 2/21, 1075cc UOP this morning; s/p 80 mg IV lasix     - per primary team, plan for revascularization today and plan for weaning IABP     Recommendations:  - No urgent indication for kidney replacement therapy.   - Please limit infusion of fluids as much as possible, recommend isotonic solutions if giving fluids   - Agree with continuing hemodynamic monitoring; if patient becomes more hypervolemic and has increased O2 requirements, will consider dialysis more urgently   - BMP BID, continuing to monitor   - Maintain MAP > 65 for renal perfusion   - Avoid nephrotoxins, contrast if possible  - strict Is/Os  - Renal dosing for medications for latest eGFR, follow medication trough as appropriate     Thank you for this consult. Nephrology will continue to follow this patient. Discussed with attending nephrologist,  Dr. Gomes.     Belem Pat MD  24 hour Renal Pager - [phone]

## 2024-02-22 NOTE — PROGRESS NOTES
"Tesfaye Milton is a 78 y.o. male on day 3 of admission presenting with Shock (CMS/HCC).    Subjective   Overnight given bumex 4 mg at 18:00 and 02:00 with 900 ml output. Lopez was placed for more accurate measurement however patient pulled lopez w/ balloon inflated and subsequent bleeding. Urology called - new lopez placed.  Remains on levo.    [place] Reading this AM:  @6 AM: Wedge 26, CVP 5, CO/CI 6/2.5, , Svo2 55, on levo 0.14    Repeat done at 9:40 - concern that wedge reading was inaccurate w/ patient diuresing   Wedge 18, CVP 6, CO/CI 6/2.6, , Svo2 60%, on levo 0.14    Objective     Physical Exam   Constitutional: NAD, laying in bed   Eyes: EOMI, clear sclera   ENMT: moist mucous membranes   Head/Neck: no appreciable JVD, swan in right internal jugular, TLC in left IJ   Respiratory/Thorax: clear to auscultation posteriorly , normal WOB on 2L O2   Cardiovascular: hushing from pulsatile IABP    Gastrointestinal: soft, NT, ND, +BS   : lopez in place w/ bloody urine    Extremities: BLE cool, IABP in left groin, B/L DP/PT are dopplerable. No LE edema or asymmetry   Neurological: AOx4, conversational, following commands, no gross focal neuro deficits   Skin: cool and dry       Last Recorded Vitals  Blood pressure 95/59, pulse 100, temperature 36 °C (96.8 °F), resp. rate 22, height 1.905 m (6' 3\"), weight 113 kg (248 lb 0.3 oz), SpO2 96 %.  Intake/Output last 3 Shifts:  I/O last 3 completed shifts:  In: 1915 (17 mL/kg) [I.V.:1315 (11.7 mL/kg); IV Piggyback:600]  Out: 4125 (36.7 mL/kg) [Urine:4125 (1 mL/kg/hr)]  Weight: 112.5 kg     Relevant Results  Scheduled medications  aspirin, 81 mg, oral, Daily  clopidogrel, 75 mg, oral, Daily  pantoprazole, 40 mg, oral, Daily before breakfast  piperacillin-tazobactam, 2.25 g, intravenous, q6h  polyethylene glycol, 17 g, oral, Daily  sennosides-docusate sodium, 2 tablet, oral, BID  vancomycin, 2,000 mg, intravenous, q24h      Continuous medications  heparin, 0-4,500 " Units/hr, Last Rate: 1,600 Units/hr (02/22/24 0633)  norepinephrine, 0.01-1 mcg/kg/min, Last Rate: 0.14 mcg/kg/min (02/21/24 2129)      PRN medications  PRN medications: acetaminophen, dextrose 10 % in water (D10W), dextrose, glucagon, heparin, oxyCODONE, oxygen     Lab Results   Component Value Date    WBC 14.5 (H) 02/22/2024    HGB 9.4 (L) 02/22/2024    HCT 26.6 (L) 02/22/2024     (L) 02/22/2024    CHOL 147 12/15/2023    TRIG 86 12/15/2023    HDL 47.6 12/15/2023     (H) 02/22/2024    AST 61 (H) 02/22/2024     (L) 02/22/2024    K 4.3 02/22/2024    CL 98 02/22/2024    CREATININE 2.33 (H) 02/22/2024    BUN 80 (H) 02/22/2024    CO2 27 02/22/2024    TSH 0.82 12/15/2023    PSA 7.7 (H) 08/12/2020    INR 1.2 (H) 02/19/2024    HGBA1C 5.4 02/19/2024         Assessment/Plan   Principal Problem:    Shock (CMS/HCC)  Active Problems:    Atherosclerotic heart disease of native coronary artery without angina pectoris    Hypercholesterolemia    Shortness of breath    UTI (urinary tract infection)    Atrial flutter (CMS/HCC)    Cardiomyopathy (CMS/HCC)    Chronic atrial fibrillation (CMS/HCC)    Persistent atrial fibrillation (CMS/HCC)    Cigarette smoker    Essential hypertension    Heart failure (CMS/HCC)    History of coronary artery stent placement    Myocardial infarction (CMS/HCC)    Congestive heart failure (CMS/HCC)    Cardiogenic shock (CMS/HCC)    NSTEMI (non-ST elevated myocardial infarction) (CMS/HCC)  Mr Tesfaye Milton is a 78 y.o. male with PMH of CAD s/p PCI to LAD and LCx (2011), Afib failed multiple DCCV, ablation and antiarrythmic therapies on Xarelto and rate control strategy, HFrEF (20-25% LVEF), mod AS, HTN, prostate cancer s/p brachytherapy (2022) who presents as a transfer from [facility] in cardiogenic shock. Initially presented with SOB, hypotension and hypo perfusion with elevated lactate, JEFF and elevated transaminitis. EKG showed SHYAM III, aVF and ST depressions in I, II, V4-V6.  Initially managed with ionotropic support with some improvement in renal and liver function. Additionally treated for sepsis with with antibiotics. LHC on 2/19 demonstrated triple vessel disease and TTE 2/13 showed known LVEF 20-25% with global hypokinesia, aortic valve with 0.6 RAUDEL, PG/MG of 25/13. Transferred to [facility] CICU for further management of shock and coronary revascularization.  On arrival was mentating well but he was cool and dry, and is anuric with elevated Cr to 2.51. [place] numbers demonstrated elevated filling pressures and low cardio index despite pharmacologic support with dobutamine and levophed 0.16. Presentation was consistent with SCAI Stage D shock, and  escalated to MCS with IABP placement on 2/19. Currently proceeded with swan guided management and  he continues to require pharmacological support for shock. Plan to consider high risk complex coronary intervention by Dr Banerjee as he would not be a surgical candidate. Renal following for possible CVVH given JEFF. Now producing urine w/ diuresis. Pending revascularization in the cath lab today.      Updates 2/22  - continue diuresis; s/p 80 mg IV lasix   - revascularization today in cath lab  - hope to wean IABP once revascularization complete   - MRSA nares negative: keep vanc until post procedure due to tenuous status   - mitts placed on patient (pulled lopez, pulled IABP which needed to be adjusted this AM)     Plan:  NEURO/PSYCH: JONAH     PULM:   #Acute hypoxic respiratory failure  DDx: volume overload/ADHF and cannot r/o PNA  CXR 2/22: persistent L>R b/l infiltrates representing edema, infection or atelectasis   MRSA negative (2/16); Repeat (2/21) negative  Plan:  -Continue diuresis (bumex 4mg IV 2/20; 4mg IV 2/21)  -Lasix 80 mg IV (2/22)  -c/w zosyn (2/18- )  -starting Vanc (2/21 - ) for HAP coverage w/ worsening consolidation on CXR   -O2 at 3L, wean as tolerated     CV:   #HFrEF (LVEF 20-25%)  #CAD s/p multiple stents with known PCI to  LAD and LCx (2011)  #NSTEMI  #Mod AS   ::RAUDEL 0.6, PG/MG of 25/13, calcified AV leaflets with poor opening of the valve c/f LFLG  ::[place] on presentation: CVP (not readable/clotted), PA 36/20(16) and CO/CI 6.3/2.6. MVO2 72%, SVR CNR  ::[place] numbers from 9 AM (2/22): Wedge 18, CVP 6, CO/CI 6/2.6, , Svo2 60%, on levo 0.14  ::Goal MAP 60 based on A line reading   Plan:  -Hold home metoprolol, entresto while in shock  -c/w asa, Plavix  -Start statin once transaminitis resolved  -c/w heparin gtt  -c/w levo, wean as tolerated   -[place] q2-4hours  -IABP placed in cath lab 12/19 PM; set 1:1   -plan for revascularization w/ Dr Banerjee 2/22  -hope to wean IABP post procedure      #Afib  No beta block   -c/w heparin gtt     GI:   #Shock liver, resolving  AST 1488>375>214>106> 77  ALT 1073>886>452>314> 238  -CTM     #GI ppx  -cont pantoprazole    RENAL:   #JEFF  Cr: 2.9>4.3>2.5>1.6>1.0>2.51>2.9  Renal US 2/14 normal  Multifactorial etiology: contrast use, shock leading to ATN  Was anuric: now producing urine s/p 4 mg IV bumex 2/20  Plan:  -RFP [id number]  -Renal following, appreciate recs   -Strict I/O, renally dose meds  -Maintain MAP > 65 for renal perfusion  - Bladder scan [id number]     #prostate cancer s/p brachytherapy  Not active. Not on tamsulosin or mirabegron      HEME/ONC:   #thrombocytopenia      ID:   #PNA  #c/f septic shock  White count  BCx 2/13 x 2 negative  MRSA negative (2/16); UCx 2/13: negative  s/p CTX 2/13-2/17  Plan  -c/w zosyn (2/18 - )  -add vanc (2/21 - )  -f/u repeat BCx x 2 2/19  -repeat MRSA nares (2/21) negative       ENDO:  #Hyperglycemia - resolved   In setting of steroid use for COPD exacerbation  -A1C 5.4     N: cardiac  GI: pantoprazole  DVT: heparin gtt  A: kathy Alexandra swan, trialysis      NOK: Wife Misti [phone] (home) and [phone](mobile)  FULL code (confirmed on admission)           Shahriar Damon MD    Attending Note:  I have reviewed and evaluated the most recent data and  results, personally examined the patient, and formulated the plan of care as presented above. This patient was critically ill and required continued critical care treatment. Teaching and any separately billable procedures are not included in the time calculation.    Tesfaye Milton is a 78 y.o. male on day 3 of admission presenting with cardiogenic Shock (CMS/HCC), CAD, HFrEF, atrial fibrillation, Moderate AS, JEFF, on vaso and Norepi.       Neurologically: Awake and alert    Cardiovascular:     Cath report:   ECHO: EF:20-25%, Valvular disease: none    Lab Results   Component Value Date    CKTOTAL 89 01/06/2021       Pulmonary:  === 02/19/24 ===    XR CHEST 1 VIEW    - Impression -  1.  Interval placement of left Trialysis line with tip overlying the  mid SVC.  2. No change or interval improvement in interstitial pulmonary edema  and patchy bilateral infiltrates.  3.  Additional medical devices as described above.    I personally reviewed the images/study and I agree with Ria Asencio DO's (radiology resident) findings as stated. This study  was interpreted at [place], [place].    MACRO:  None    Signed by: Piotr Cramer 2/20/2024 10:02 AM  Dictation workstation:   [id number]     Renal: Estimated Creatinine Clearance: 36.6 mL/min (A) (by C-G formula based on SCr of 2.33 mg/dL (H)).  Lab Results   Component Value Date    CREATININE 2.33 (H) 02/22/2024       Heme-onc:   Lab Results   Component Value Date    HGB 9.4 (L) 02/22/2024     Lab Results   Component Value Date     (L) 02/22/2024        GI:none    Nutrition: Oral intake    Vascular: None    ID: abx    Plan:  1) wean pressors  2) discuss PCI  3) monitor UO  4) transfuse    Billing Provider Critical Care Time: 45 minutes    Shahriar Damon MD

## 2024-02-22 NOTE — CARE PLAN
Problem: Safety - Medical Restraint  Goal: Free from restraint(s) (Restraint for Interference with Medical Device)  Outcome: Met

## 2024-02-22 NOTE — POST-PROCEDURE NOTE
Physician Transition of Care Summary  Invasive Cardiovascular Lab    Procedure Date: 2/22/2024  Attending:    * Dayne Kearney - Primary  Resident/Fellow/Other Assistant: Surgeon(s) and Role:     * Ronen Haas MD - Fellow     * Power Duran MD - Fellow    Indications:   Pre-op Diagnosis     * Cardiogenic shock (CMS/HCC) [R57.0]     * NSTEMI (non-ST elevated myocardial infarction) (CMS/HCC) [I21.4]    Post-procedure diagnosis:   Post-op Diagnosis     * Cardiogenic shock (CMS/HCC) [R57.0]     * NSTEMI (non-ST elevated myocardial infarction) (CMS/HCC) [I21.4]    Procedure(s):     * PCI ARDEN Stent- Coronary      Procedure Findings:   S/p PCI to LAD and LCX.  As below   Description of the Procedure:   Right radial 6Fr--> TR band     Complications:   None     Stents/Implants:   Cardiovascular Implants       Stent    Stent, Todd Damascus Arden, 2.50 X 38rx - [id number] - Implanted        Inventory item: STENT, JADON FRONTIER ARDEN, 2.50 X 38RX Model/Cat number: [id number]    : [employer] Lot number: [id number]    Device identifier: [id number]        As of 2/22/2024       Status: Implanted                      Stent, Jadon Damascus Arden, 3.50 X 38rx - [id number] - Implanted        Inventory item: STENT, JADON FRONTIER ARDEN, 3.50 X 38RX Model/Cat number: [id number]    : [employer] Lot number: [id number]    Device identifier: [id number]        As of 2/22/2024       Status: Implanted                              Anticoagulation/Antiplatelet Plan:   DAPT for 1 year   Statin     Estimated Blood Loss:   10 mL    Anesthesia: Moderate Sedation Anesthesia Staff: No anesthesia staff entered.    Any Specimen(s) Removed:   No specimens collected during this procedure.    [facility]       Electronically signed by: Power Duran MD, 2/22/2024 5:28 PM

## 2024-02-23 ENCOUNTER — APPOINTMENT (OUTPATIENT)
Dept: RADIOLOGY | Facility: HOSPITAL | Age: 79
DRG: 270 | End: 2024-02-23
Payer: MEDICARE

## 2024-02-23 ENCOUNTER — APPOINTMENT (OUTPATIENT)
Dept: CARDIOLOGY | Facility: HOSPITAL | Age: 79
DRG: 270 | End: 2024-02-23
Payer: MEDICARE

## 2024-02-23 PROBLEM — Z98.61 HISTORY OF PERCUTANEOUS CORONARY INTERVENTION: Status: ACTIVE | Noted: 2024-02-23

## 2024-02-23 LAB
ABO GROUP (TYPE) IN BLOOD: NORMAL
ACT BLD: 310 SEC (ref 89–169)
ACT BLD: 339 SEC (ref 89–169)
ACT BLD: 371 SEC (ref 89–169)
ALBUMIN SERPL BCP-MCNC: 2.8 G/DL (ref 3.4–5)
ALBUMIN SERPL BCP-MCNC: 2.8 G/DL (ref 3.4–5)
ALBUMIN SERPL BCP-MCNC: 2.9 G/DL (ref 3.4–5)
ALP SERPL-CCNC: 59 U/L (ref 33–136)
ALP SERPL-CCNC: 61 U/L (ref 33–136)
ALT SERPL W P-5'-P-CCNC: 154 U/L (ref 10–52)
ALT SERPL W P-5'-P-CCNC: 167 U/L (ref 10–52)
ANION GAP BLDMV CALCULATED.4IONS-SCNC: 10 MMO/L (ref 10–25)
ANION GAP BLDMV CALCULATED.4IONS-SCNC: 11 MMO/L (ref 10–25)
ANION GAP BLDMV CALCULATED.4IONS-SCNC: 12 MMO/L (ref 10–25)
ANION GAP BLDMV CALCULATED.4IONS-SCNC: 7 MMO/L (ref 10–25)
ANION GAP BLDMV CALCULATED.4IONS-SCNC: 8 MMO/L (ref 10–25)
ANION GAP BLDMV CALCULATED.4IONS-SCNC: 8 MMO/L (ref 10–25)
ANION GAP BLDMV CALCULATED.4IONS-SCNC: 9 MMO/L (ref 10–25)
ANION GAP SERPL CALC-SCNC: 16 MMOL/L (ref 10–20)
ANION GAP SERPL CALC-SCNC: 17 MMOL/L (ref 10–20)
ANION GAP SERPL CALC-SCNC: 18 MMOL/L (ref 10–20)
ANTIBODY SCREEN: NORMAL
AST SERPL W P-5'-P-CCNC: 62 U/L (ref 9–39)
AST SERPL W P-5'-P-CCNC: 65 U/L (ref 9–39)
ATRIAL RATE: 80 BPM
BASE EXCESS BLDMV CALC-SCNC: 3.6 MMOL/L (ref -2–3)
BASE EXCESS BLDMV CALC-SCNC: 3.6 MMOL/L (ref -2–3)
BASE EXCESS BLDMV CALC-SCNC: 5.4 MMOL/L (ref -2–3)
BASE EXCESS BLDMV CALC-SCNC: 6 MMOL/L (ref -2–3)
BASE EXCESS BLDMV CALC-SCNC: 6.3 MMOL/L (ref -2–3)
BASE EXCESS BLDMV CALC-SCNC: 6.3 MMOL/L (ref -2–3)
BASE EXCESS BLDMV CALC-SCNC: 6.6 MMOL/L (ref -2–3)
BASE EXCESS BLDMV CALC-SCNC: 7.3 MMOL/L (ref -2–3)
BASE EXCESS BLDMV CALC-SCNC: 7.9 MMOL/L (ref -2–3)
BASE EXCESS BLDMV CALC-SCNC: 8.4 MMOL/L (ref -2–3)
BASOPHILS # BLD AUTO: 0.02 X10*3/UL (ref 0–0.1)
BASOPHILS # BLD AUTO: 0.02 X10*3/UL (ref 0–0.1)
BASOPHILS NFR BLD AUTO: 0.1 %
BASOPHILS NFR BLD AUTO: 0.1 %
BILIRUB SERPL-MCNC: 1.3 MG/DL (ref 0–1.2)
BILIRUB SERPL-MCNC: 1.4 MG/DL (ref 0–1.2)
BODY TEMPERATURE: 37 DEGREES CELSIUS
BUN SERPL-MCNC: 70 MG/DL (ref 6–23)
BUN SERPL-MCNC: 74 MG/DL (ref 6–23)
BUN SERPL-MCNC: 83 MG/DL (ref 6–23)
CA-I BLDMV-SCNC: 0.97 MMOL/L (ref 1.1–1.33)
CA-I BLDMV-SCNC: 1.06 MMOL/L (ref 1.1–1.33)
CA-I BLDMV-SCNC: 1.07 MMOL/L (ref 1.1–1.33)
CA-I BLDMV-SCNC: 1.07 MMOL/L (ref 1.1–1.33)
CA-I BLDMV-SCNC: 1.09 MMOL/L (ref 1.1–1.33)
CA-I BLDMV-SCNC: 1.09 MMOL/L (ref 1.1–1.33)
CA-I BLDMV-SCNC: 1.11 MMOL/L (ref 1.1–1.33)
CA-I BLDMV-SCNC: 1.11 MMOL/L (ref 1.1–1.33)
CA-I BLDMV-SCNC: 1.12 MMOL/L (ref 1.1–1.33)
CA-I BLDMV-SCNC: 1.14 MMOL/L (ref 1.1–1.33)
CALCIUM SERPL-MCNC: 8.1 MG/DL (ref 8.6–10.6)
CALCIUM SERPL-MCNC: 8.2 MG/DL (ref 8.6–10.6)
CALCIUM SERPL-MCNC: 8.3 MG/DL (ref 8.6–10.6)
CHLORIDE BLD-SCNC: 100 MMOL/L (ref 98–107)
CHLORIDE BLD-SCNC: 101 MMOL/L (ref 98–107)
CHLORIDE BLD-SCNC: 101 MMOL/L (ref 98–107)
CHLORIDE BLD-SCNC: 99 MMOL/L (ref 98–107)
CHLORIDE SERPL-SCNC: 98 MMOL/L (ref 98–107)
CO2 SERPL-SCNC: 27 MMOL/L (ref 21–32)
CO2 SERPL-SCNC: 28 MMOL/L (ref 21–32)
CO2 SERPL-SCNC: 29 MMOL/L (ref 21–32)
CREAT SERPL-MCNC: 2.06 MG/DL (ref 0.5–1.3)
CREAT SERPL-MCNC: 2.31 MG/DL (ref 0.5–1.3)
CREAT SERPL-MCNC: 2.31 MG/DL (ref 0.5–1.3)
EGFRCR SERPLBLD CKD-EPI 2021: 29 ML/MIN/1.73M*2
EGFRCR SERPLBLD CKD-EPI 2021: 29 ML/MIN/1.73M*2
EGFRCR SERPLBLD CKD-EPI 2021: 33 ML/MIN/1.73M*2
EOSINOPHIL # BLD AUTO: 0 X10*3/UL (ref 0–0.4)
EOSINOPHIL # BLD AUTO: 0.01 X10*3/UL (ref 0–0.4)
EOSINOPHIL NFR BLD AUTO: 0 %
EOSINOPHIL NFR BLD AUTO: 0.1 %
ERYTHROCYTE [DISTWIDTH] IN BLOOD BY AUTOMATED COUNT: 14.1 % (ref 11.5–14.5)
ERYTHROCYTE [DISTWIDTH] IN BLOOD BY AUTOMATED COUNT: 14.5 % (ref 11.5–14.5)
ERYTHROCYTE [DISTWIDTH] IN BLOOD BY AUTOMATED COUNT: 16.6 % (ref 11.5–14.5)
FIBRINOGEN PPP-MCNC: 631 MG/DL (ref 200–400)
GLUCOSE BLD-MCNC: 130 MG/DL (ref 74–99)
GLUCOSE BLD-MCNC: 143 MG/DL (ref 74–99)
GLUCOSE BLD-MCNC: 144 MG/DL (ref 74–99)
GLUCOSE BLD-MCNC: 144 MG/DL (ref 74–99)
GLUCOSE BLD-MCNC: 145 MG/DL (ref 74–99)
GLUCOSE BLD-MCNC: 149 MG/DL (ref 74–99)
GLUCOSE BLD-MCNC: 150 MG/DL (ref 74–99)
GLUCOSE BLD-MCNC: 156 MG/DL (ref 74–99)
GLUCOSE BLD-MCNC: 167 MG/DL (ref 74–99)
GLUCOSE BLD-MCNC: 171 MG/DL (ref 74–99)
GLUCOSE SERPL-MCNC: 136 MG/DL (ref 74–99)
GLUCOSE SERPL-MCNC: 143 MG/DL (ref 74–99)
GLUCOSE SERPL-MCNC: 150 MG/DL (ref 74–99)
HCO3 BLDMV-SCNC: 28.5 MMOL/L (ref 22–26)
HCO3 BLDMV-SCNC: 28.5 MMOL/L (ref 22–26)
HCO3 BLDMV-SCNC: 29.9 MMOL/L (ref 22–26)
HCO3 BLDMV-SCNC: 30.6 MMOL/L (ref 22–26)
HCO3 BLDMV-SCNC: 31.2 MMOL/L (ref 22–26)
HCO3 BLDMV-SCNC: 31.2 MMOL/L (ref 22–26)
HCO3 BLDMV-SCNC: 31.9 MMOL/L (ref 22–26)
HCO3 BLDMV-SCNC: 32 MMOL/L (ref 22–26)
HCO3 BLDMV-SCNC: 33.2 MMOL/L (ref 22–26)
HCO3 BLDMV-SCNC: 33.4 MMOL/L (ref 22–26)
HCT VFR BLD AUTO: 23.2 % (ref 41–52)
HCT VFR BLD AUTO: 26 % (ref 41–52)
HCT VFR BLD AUTO: 26 % (ref 41–52)
HCT VFR BLD EST: 24 % (ref 41–52)
HCT VFR BLD EST: 25 % (ref 41–52)
HCT VFR BLD EST: 26 % (ref 41–52)
HCT VFR BLD EST: 29 % (ref 41–52)
HCT VFR BLD EST: 29 % (ref 41–52)
HCT VFR BLD EST: 34 % (ref 41–52)
HCT VFR BLD EST: 50 % (ref 41–52)
HGB BLD-MCNC: 8.4 G/DL (ref 13.5–17.5)
HGB BLD-MCNC: 8.7 G/DL (ref 13.5–17.5)
HGB BLD-MCNC: 8.8 G/DL (ref 13.5–17.5)
HGB BLDMV-MCNC: 11.2 G/DL (ref 13.5–17.5)
HGB BLDMV-MCNC: 16.7 G/DL (ref 13.5–17.5)
HGB BLDMV-MCNC: 8 G/DL (ref 13.5–17.5)
HGB BLDMV-MCNC: 8.2 G/DL (ref 13.5–17.5)
HGB BLDMV-MCNC: 8.5 G/DL (ref 13.5–17.5)
HGB BLDMV-MCNC: 8.6 G/DL (ref 13.5–17.5)
HGB BLDMV-MCNC: 8.7 G/DL (ref 13.5–17.5)
HGB BLDMV-MCNC: 8.8 G/DL (ref 13.5–17.5)
HGB BLDMV-MCNC: 9.6 G/DL (ref 13.5–17.5)
HGB BLDMV-MCNC: 9.8 G/DL (ref 13.5–17.5)
IMM GRANULOCYTES # BLD AUTO: 0.19 X10*3/UL (ref 0–0.5)
IMM GRANULOCYTES # BLD AUTO: 0.27 X10*3/UL (ref 0–0.5)
IMM GRANULOCYTES NFR BLD AUTO: 1 % (ref 0–0.9)
IMM GRANULOCYTES NFR BLD AUTO: 1.4 % (ref 0–0.9)
INHALED O2 CONCENTRATION: 36 %
INHALED O2 CONCENTRATION: 40 %
INHALED O2 CONCENTRATION: 40 %
LACTATE BLDMV-SCNC: 1.1 MMOL/L (ref 0.4–2)
LACTATE BLDMV-SCNC: 1.4 MMOL/L (ref 0.4–2)
LACTATE BLDMV-SCNC: 1.7 MMOL/L (ref 0.4–2)
LACTATE BLDMV-SCNC: 1.8 MMOL/L (ref 0.4–2)
LACTATE BLDMV-SCNC: 1.9 MMOL/L (ref 0.4–2)
LACTATE BLDMV-SCNC: 2 MMOL/L (ref 0.4–2)
LACTATE BLDMV-SCNC: 2.4 MMOL/L (ref 0.4–2)
LACTATE BLDMV-SCNC: 2.5 MMOL/L (ref 0.4–2)
LACTATE BLDMV-SCNC: 2.6 MMOL/L (ref 0.4–2)
LACTATE BLDMV-SCNC: 3.3 MMOL/L (ref 0.4–2)
LDH SERPL L TO P-CCNC: 554 U/L (ref 84–246)
LYMPHOCYTES # BLD AUTO: 0.57 X10*3/UL (ref 0.8–3)
LYMPHOCYTES # BLD AUTO: 0.63 X10*3/UL (ref 0.8–3)
LYMPHOCYTES NFR BLD AUTO: 3 %
LYMPHOCYTES NFR BLD AUTO: 3.4 %
MAGNESIUM SERPL-MCNC: 2.14 MG/DL (ref 1.6–2.4)
MAGNESIUM SERPL-MCNC: 2.3 MG/DL (ref 1.6–2.4)
MAGNESIUM SERPL-MCNC: 2.38 MG/DL (ref 1.6–2.4)
MCH RBC QN AUTO: 34.7 PG (ref 26–34)
MCH RBC QN AUTO: 35.1 PG (ref 26–34)
MCH RBC QN AUTO: 35.4 PG (ref 26–34)
MCHC RBC AUTO-ENTMCNC: 33.5 G/DL (ref 32–36)
MCHC RBC AUTO-ENTMCNC: 33.8 G/DL (ref 32–36)
MCHC RBC AUTO-ENTMCNC: 36.2 G/DL (ref 32–36)
MCV RBC AUTO: 104 FL (ref 80–100)
MCV RBC AUTO: 104 FL (ref 80–100)
MCV RBC AUTO: 98 FL (ref 80–100)
MONOCYTES # BLD AUTO: 0.97 X10*3/UL (ref 0.05–0.8)
MONOCYTES # BLD AUTO: 1.12 X10*3/UL (ref 0.05–0.8)
MONOCYTES NFR BLD AUTO: 5.1 %
MONOCYTES NFR BLD AUTO: 6 %
NEUTROPHILS # BLD AUTO: 16.76 X10*3/UL (ref 1.6–5.5)
NEUTROPHILS # BLD AUTO: 17.05 X10*3/UL (ref 1.6–5.5)
NEUTROPHILS NFR BLD AUTO: 89.4 %
NEUTROPHILS NFR BLD AUTO: 90.4 %
NRBC BLD-RTO: 0 /100 WBCS (ref 0–0)
NRBC BLD-RTO: 0.2 /100 WBCS (ref 0–0)
NRBC BLD-RTO: 0.3 /100 WBCS (ref 0–0)
OXYHGB MFR BLDMV: 36.1 % (ref 45–75)
OXYHGB MFR BLDMV: 37.3 % (ref 45–75)
OXYHGB MFR BLDMV: 38.2 % (ref 45–75)
OXYHGB MFR BLDMV: 40.3 % (ref 45–75)
OXYHGB MFR BLDMV: 41.3 % (ref 45–75)
OXYHGB MFR BLDMV: 42 % (ref 45–75)
OXYHGB MFR BLDMV: 48.2 % (ref 45–75)
OXYHGB MFR BLDMV: 53 % (ref 45–75)
OXYHGB MFR BLDMV: 55.9 % (ref 45–75)
OXYHGB MFR BLDMV: 56.9 % (ref 45–75)
PCO2 BLDMV: 43 MM HG (ref 41–51)
PCO2 BLDMV: 43 MM HG (ref 41–51)
PCO2 BLDMV: 44 MM HG (ref 41–51)
PCO2 BLDMV: 44 MM HG (ref 41–51)
PCO2 BLDMV: 45 MM HG (ref 41–51)
PCO2 BLDMV: 46 MM HG (ref 41–51)
PCO2 BLDMV: 46 MM HG (ref 41–51)
PCO2 BLDMV: 48 MM HG (ref 41–51)
PCO2 BLDMV: 48 MM HG (ref 41–51)
PCO2 BLDMV: 50 MM HG (ref 41–51)
PH BLDMV: 7.42 PH (ref 7.33–7.43)
PH BLDMV: 7.43 PH (ref 7.33–7.43)
PH BLDMV: 7.44 PH (ref 7.33–7.43)
PH BLDMV: 7.44 PH (ref 7.33–7.43)
PH BLDMV: 7.45 PH (ref 7.33–7.43)
PH BLDMV: 7.46 PH (ref 7.33–7.43)
PHOSPHATE SERPL-MCNC: 5.3 MG/DL (ref 2.5–4.9)
PLATELET # BLD AUTO: 115 X10*3/UL (ref 150–450)
PLATELET # BLD AUTO: 121 X10*3/UL (ref 150–450)
PLATELET # BLD AUTO: 126 X10*3/UL (ref 150–450)
PO2 BLDMV: 23 MM HG (ref 35–45)
PO2 BLDMV: 28 MM HG (ref 35–45)
PO2 BLDMV: 29 MM HG (ref 35–45)
PO2 BLDMV: 30 MM HG (ref 35–45)
PO2 BLDMV: 31 MM HG (ref 35–45)
PO2 BLDMV: 34 MM HG (ref 35–45)
PO2 BLDMV: 35 MM HG (ref 35–45)
PO2 BLDMV: 36 MM HG (ref 35–45)
PO2 BLDMV: 37 MM HG (ref 35–45)
PO2 BLDMV: 40 MM HG (ref 35–45)
POTASSIUM BLDMV-SCNC: 3.5 MMOL/L (ref 3.5–5.3)
POTASSIUM BLDMV-SCNC: 3.9 MMOL/L (ref 3.5–5.3)
POTASSIUM BLDMV-SCNC: 4 MMOL/L (ref 3.5–5.3)
POTASSIUM BLDMV-SCNC: 4.1 MMOL/L (ref 3.5–5.3)
POTASSIUM BLDMV-SCNC: 4.2 MMOL/L (ref 3.5–5.3)
POTASSIUM BLDMV-SCNC: 4.3 MMOL/L (ref 3.5–5.3)
POTASSIUM BLDMV-SCNC: 4.3 MMOL/L (ref 3.5–5.3)
POTASSIUM BLDMV-SCNC: 4.4 MMOL/L (ref 3.5–5.3)
POTASSIUM SERPL-SCNC: 4.1 MMOL/L (ref 3.5–5.3)
POTASSIUM SERPL-SCNC: 4.2 MMOL/L (ref 3.5–5.3)
POTASSIUM SERPL-SCNC: 4.2 MMOL/L (ref 3.5–5.3)
PROT SERPL-MCNC: 4.8 G/DL (ref 6.4–8.2)
PROT SERPL-MCNC: 4.9 G/DL (ref 6.4–8.2)
Q ONSET: 214 MS
QRS COUNT: 17 BEATS
QRS DURATION: 116 MS
QT INTERVAL: 304 MS
QTC CALCULATION(BAZETT): 403 MS
QTC FREDERICIA: 367 MS
R AXIS: 21 DEGREES
RBC # BLD AUTO: 2.37 X10*6/UL (ref 4.5–5.9)
RBC # BLD AUTO: 2.51 X10*6/UL (ref 4.5–5.9)
RBC # BLD AUTO: 2.51 X10*6/UL (ref 4.5–5.9)
RH FACTOR (ANTIGEN D): NORMAL
SAO2 % BLDMV: 37 % (ref 45–75)
SAO2 % BLDMV: 38 % (ref 45–75)
SAO2 % BLDMV: 39 % (ref 45–75)
SAO2 % BLDMV: 41 % (ref 45–75)
SAO2 % BLDMV: 42 % (ref 45–75)
SAO2 % BLDMV: 43 % (ref 45–75)
SAO2 % BLDMV: 50 % (ref 45–75)
SAO2 % BLDMV: 54 % (ref 45–75)
SAO2 % BLDMV: 57 % (ref 45–75)
SAO2 % BLDMV: 58 % (ref 45–75)
SODIUM BLDMV-SCNC: 135 MMOL/L (ref 136–145)
SODIUM BLDMV-SCNC: 136 MMOL/L (ref 136–145)
SODIUM BLDMV-SCNC: 137 MMOL/L (ref 136–145)
SODIUM BLDMV-SCNC: 137 MMOL/L (ref 136–145)
SODIUM SERPL-SCNC: 138 MMOL/L (ref 136–145)
SODIUM SERPL-SCNC: 139 MMOL/L (ref 136–145)
SODIUM SERPL-SCNC: 140 MMOL/L (ref 136–145)
T AXIS: 171 DEGREES
T OFFSET: 366 MS
UFH PPP CHRO-ACNC: 0.4 IU/ML
UFH PPP CHRO-ACNC: 0.5 IU/ML
VENTRICULAR RATE: 106 BPM
WBC # BLD AUTO: 18.4 X10*3/UL (ref 4.4–11.3)
WBC # BLD AUTO: 18.7 X10*3/UL (ref 4.4–11.3)
WBC # BLD AUTO: 18.9 X10*3/UL (ref 4.4–11.3)

## 2024-02-23 PROCEDURE — 71045 X-RAY EXAM CHEST 1 VIEW: CPT

## 2024-02-23 PROCEDURE — 85025 COMPLETE CBC W/AUTO DIFF WBC: CPT | Performed by: STUDENT IN AN ORGANIZED HEALTH CARE EDUCATION/TRAINING PROGRAM

## 2024-02-23 PROCEDURE — 93308 TTE F-UP OR LMTD: CPT | Performed by: INTERNAL MEDICINE

## 2024-02-23 PROCEDURE — 71045 X-RAY EXAM CHEST 1 VIEW: CPT | Performed by: RADIOLOGY

## 2024-02-23 PROCEDURE — P9016 RBC LEUKOCYTES REDUCED: HCPCS

## 2024-02-23 PROCEDURE — 85384 FIBRINOGEN ACTIVITY: CPT | Performed by: STUDENT IN AN ORGANIZED HEALTH CARE EDUCATION/TRAINING PROGRAM

## 2024-02-23 PROCEDURE — 83735 ASSAY OF MAGNESIUM: CPT | Performed by: STUDENT IN AN ORGANIZED HEALTH CARE EDUCATION/TRAINING PROGRAM

## 2024-02-23 PROCEDURE — 99232 SBSQ HOSP IP/OBS MODERATE 35: CPT

## 2024-02-23 PROCEDURE — 2020000001 HC ICU ROOM DAILY

## 2024-02-23 PROCEDURE — 99223 1ST HOSP IP/OBS HIGH 75: CPT | Performed by: INTERNAL MEDICINE

## 2024-02-23 PROCEDURE — 71250 CT THORAX DX C-: CPT | Performed by: RADIOLOGY

## 2024-02-23 PROCEDURE — 80053 COMPREHEN METABOLIC PANEL: CPT | Performed by: STUDENT IN AN ORGANIZED HEALTH CARE EDUCATION/TRAINING PROGRAM

## 2024-02-23 PROCEDURE — 93325 DOPPLER ECHO COLOR FLOW MAPG: CPT

## 2024-02-23 PROCEDURE — 74176 CT ABD & PELVIS W/O CONTRAST: CPT | Performed by: RADIOLOGY

## 2024-02-23 PROCEDURE — 2500000004 HC RX 250 GENERAL PHARMACY W/ HCPCS (ALT 636 FOR OP/ED): Performed by: STUDENT IN AN ORGANIZED HEALTH CARE EDUCATION/TRAINING PROGRAM

## 2024-02-23 PROCEDURE — 2500000002 HC RX 250 W HCPCS SELF ADMINISTERED DRUGS (ALT 637 FOR MEDICARE OP, ALT 636 FOR OP/ED): Performed by: STUDENT IN AN ORGANIZED HEALTH CARE EDUCATION/TRAINING PROGRAM

## 2024-02-23 PROCEDURE — 85027 COMPLETE CBC AUTOMATED: CPT | Performed by: STUDENT IN AN ORGANIZED HEALTH CARE EDUCATION/TRAINING PROGRAM

## 2024-02-23 PROCEDURE — 74176 CT ABD & PELVIS W/O CONTRAST: CPT

## 2024-02-23 PROCEDURE — 37799 UNLISTED PX VASCULAR SURGERY: CPT | Performed by: STUDENT IN AN ORGANIZED HEALTH CARE EDUCATION/TRAINING PROGRAM

## 2024-02-23 PROCEDURE — 2500000001 HC RX 250 WO HCPCS SELF ADMINISTERED DRUGS (ALT 637 FOR MEDICARE OP): Performed by: STUDENT IN AN ORGANIZED HEALTH CARE EDUCATION/TRAINING PROGRAM

## 2024-02-23 PROCEDURE — 99291 CRITICAL CARE FIRST HOUR: CPT | Performed by: STUDENT IN AN ORGANIZED HEALTH CARE EDUCATION/TRAINING PROGRAM

## 2024-02-23 PROCEDURE — 36430 TRANSFUSION BLD/BLD COMPNT: CPT

## 2024-02-23 PROCEDURE — 85520 HEPARIN ASSAY: CPT

## 2024-02-23 PROCEDURE — 2500000004 HC RX 250 GENERAL PHARMACY W/ HCPCS (ALT 636 FOR OP/ED)

## 2024-02-23 PROCEDURE — 99231 SBSQ HOSP IP/OBS SF/LOW 25: CPT | Performed by: NURSE PRACTITIONER

## 2024-02-23 PROCEDURE — 83615 LACTATE (LD) (LDH) ENZYME: CPT | Performed by: STUDENT IN AN ORGANIZED HEALTH CARE EDUCATION/TRAINING PROGRAM

## 2024-02-23 PROCEDURE — 84132 ASSAY OF SERUM POTASSIUM: CPT

## 2024-02-23 PROCEDURE — 36415 COLL VENOUS BLD VENIPUNCTURE: CPT | Performed by: STUDENT IN AN ORGANIZED HEALTH CARE EDUCATION/TRAINING PROGRAM

## 2024-02-23 PROCEDURE — 93325 DOPPLER ECHO COLOR FLOW MAPG: CPT | Performed by: INTERNAL MEDICINE

## 2024-02-23 PROCEDURE — 86920 COMPATIBILITY TEST SPIN: CPT

## 2024-02-23 PROCEDURE — 93308 TTE F-UP OR LMTD: CPT | Mod: MUE

## 2024-02-23 PROCEDURE — 87040 BLOOD CULTURE FOR BACTERIA: CPT | Performed by: STUDENT IN AN ORGANIZED HEALTH CARE EDUCATION/TRAINING PROGRAM

## 2024-02-23 PROCEDURE — 84132 ASSAY OF SERUM POTASSIUM: CPT | Performed by: STUDENT IN AN ORGANIZED HEALTH CARE EDUCATION/TRAINING PROGRAM

## 2024-02-23 PROCEDURE — 86901 BLOOD TYPING SEROLOGIC RH(D): CPT | Performed by: STUDENT IN AN ORGANIZED HEALTH CARE EDUCATION/TRAINING PROGRAM

## 2024-02-23 PROCEDURE — 99222 1ST HOSP IP/OBS MODERATE 55: CPT | Performed by: INTERNAL MEDICINE

## 2024-02-23 RX ORDER — DOBUTAMINE HYDROCHLORIDE 400 MG/100ML
2.5-2 INJECTION INTRAVENOUS CONTINUOUS
Status: DISCONTINUED | OUTPATIENT
Start: 2024-02-23 | End: 2024-02-26

## 2024-02-23 RX ORDER — DOBUTAMINE HYDROCHLORIDE 400 MG/100ML
INJECTION INTRAVENOUS
Status: COMPLETED
Start: 2024-02-23 | End: 2024-02-23

## 2024-02-23 RX ADMIN — DOBUTAMINE HYDROCHLORIDE 2.5 MCG/KG/MIN: 400 INJECTION INTRAVENOUS at 05:59

## 2024-02-23 RX ADMIN — HEPARIN SODIUM AND DEXTROSE 1600 UNITS/HR: 10000; 5 INJECTION INTRAVENOUS at 21:46

## 2024-02-23 RX ADMIN — PERFLUTREN 10 ML OF DILUTION: 6.52 INJECTION, SUSPENSION INTRAVENOUS at 09:48

## 2024-02-23 RX ADMIN — ASPIRIN 81 MG: 81 TABLET, COATED ORAL at 09:32

## 2024-02-23 RX ADMIN — PIPERACILLIN SODIUM AND TAZOBACTAM SODIUM 2.25 G: 2; .25 INJECTION, SOLUTION INTRAVENOUS at 20:08

## 2024-02-23 RX ADMIN — PANTOPRAZOLE SODIUM 40 MG: 40 TABLET, DELAYED RELEASE ORAL at 07:57

## 2024-02-23 RX ADMIN — VANCOMYCIN HYDROCHLORIDE 2000 MG: 5 INJECTION, POWDER, LYOPHILIZED, FOR SOLUTION INTRAVENOUS at 09:24

## 2024-02-23 RX ADMIN — SODIUM CHLORIDE, POTASSIUM CHLORIDE, SODIUM LACTATE AND CALCIUM CHLORIDE 250 ML: 600; 310; 30; 20 INJECTION, SOLUTION INTRAVENOUS at 07:15

## 2024-02-23 RX ADMIN — PIPERACILLIN SODIUM AND TAZOBACTAM SODIUM 2.25 G: 2; .25 INJECTION, SOLUTION INTRAVENOUS at 15:26

## 2024-02-23 RX ADMIN — PIPERACILLIN SODIUM AND TAZOBACTAM SODIUM 2.25 G: 2; .25 INJECTION, SOLUTION INTRAVENOUS at 02:29

## 2024-02-23 RX ADMIN — HEPARIN SODIUM AND DEXTROSE 1600 UNITS/HR: 10000; 5 INJECTION INTRAVENOUS at 06:00

## 2024-02-23 RX ADMIN — POLYETHYLENE GLYCOL 3350 17 G: 17 POWDER, FOR SOLUTION ORAL at 09:32

## 2024-02-23 RX ADMIN — SENNOSIDES AND DOCUSATE SODIUM 2 TABLET: 8.6; 5 TABLET ORAL at 09:32

## 2024-02-23 RX ADMIN — CLOPIDOGREL BISULFATE 75 MG: 75 TABLET ORAL at 09:32

## 2024-02-23 RX ADMIN — PIPERACILLIN SODIUM AND TAZOBACTAM SODIUM 2.25 G: 2; .25 INJECTION, SOLUTION INTRAVENOUS at 07:57

## 2024-02-23 RX ADMIN — SENNOSIDES AND DOCUSATE SODIUM 2 TABLET: 8.6; 5 TABLET ORAL at 21:46

## 2024-02-23 RX ADMIN — SODIUM CHLORIDE, POTASSIUM CHLORIDE, SODIUM LACTATE AND CALCIUM CHLORIDE 250 ML: 600; 310; 30; 20 INJECTION, SOLUTION INTRAVENOUS at 06:00

## 2024-02-23 ASSESSMENT — PAIN - FUNCTIONAL ASSESSMENT
PAIN_FUNCTIONAL_ASSESSMENT: 0-10

## 2024-02-23 ASSESSMENT — PAIN SCALES - GENERAL
PAINLEVEL_OUTOF10: 0 - NO PAIN

## 2024-02-23 NOTE — CONSULTS
HPI:   78-year-old gentleman with past medical history of coronary artery disease and PCI.  The patient presented with shortness of breath and was found to have NSTEMI and cardiogenic shock.  There was evidence of target organ damage involving kidneys and the liver.  The patient required vasopressors.  His echocardiogram showed ejection fraction of 20-25%.  The patient was initially brought to the Cath Lab on 2/19/2024 for intra-aortic balloon pump insertion.  Subsequently the patient underwent PCI to LAD and the circumflex and 2/21/2024.    Patient's echocardiogram showed severely calcified aortic valve with a concern of low-flow low gradient aortic stenosis (PV 2.48, MG 13, RAUDEL 0.63, DVI 0.20).  The patient continues to be on vasopressor support and is experiencing difficulty weaning off the balloon pump.  Structural cardiology team was consulted to evaluate benefit of possible BAV.    Currently patient is delirious requiring restraints.  He was on dobutamine and Levophed this morning.  Levophed has just been stopped.  His labs show hemoglobin of 8.4 which is slowly downtrending.  His creatinine is overall stable at around 2.3.  Chest x-ray continues to show evidence of infiltrate which is combination of both fluid overload and possible pneumonia.      ROS:  Could not be efficiently obtained.  But all pertinent findings are mentioned in HPI.      TAVR Workup:     - TTE: EF 20-25%, suspected low-flow low gradient aortic stenosis.  (PV 2.48, MG 13, RAUDEL 0.63, DVI 0.20).   - CT TAVR: Noncon CT pending  - LHC: Recent PCI to LAD/LCx 2/21/2024  -    Physical Exam:  Constitutional: awake,disoriented  Eyes: no erythema, swelling or discharge from the eye .   ENT: no erythema, edema, exudate or lesions .   Neck: neck is supple, no JVD  Pulmonary: reduced bilateral bases  Cardiovascular: RRR, 2/6 YOHANA RUSB,  1+ pitting edema, IABP in place  Abdomen: abdomen non-tender, no masses  and no hepatomegaly .   Neurologic:  non-focal neurologic examination.           2/23/2024     5:37 AM 2/23/2024     6:00 AM 2/23/2024     7:00 AM 2/23/2024     8:00 AM 2/23/2024     9:00 AM 2/23/2024    10:00 AM 2/23/2024    11:00 AM   Vitals   Heart Rate 129 106 114 121 117 103 105   Temp    37.1 °C (98.8 °F)      Resp 16 27 15 20 32 15 14   Weight (lb)  242.51        BMI  30.31 kg/m2        BSA (m2)  2.41 m2             Current Outpatient Medications   Medication Instructions    clopidogrel (PLAVIX) 75 mg, oral, Daily    metoprolol succinate XL (TOPROL-XL) 100 mg, oral, Daily, Do not crush or chew.    metoprolol tartrate (LOPRESSOR) 50 mg, oral, 3 times daily, For 90 days    mirabegron (Myrbetriq) 25 mg tablet extended release 24 hr 24 hr tablet     nitroglycerin (Nitrostat) 0.4 mg SL tablet sublingual, may be repeated every 5 minutes up to a maximum of 3 doses in a 15 minute period<BR>    rosuvastatin (CRESTOR) 10 mg, oral, Daily    sacubitriL-valsartan (Entresto)  mg tablet 1 tablet, oral, 2 times daily    tamsulosin (Flomax) 0.4 mg 24 hr capsule     Xarelto 20 mg, oral, Daily        Impression:   78-year-old gentleman with known coronary disease with previous PCI who presented with NSTEMI and cardiogenic shock.  Patient underwent PCI to LAD and left circumflex and intra-aortic balloon pump insertion.  In addition there is a concern that patient has severe low-flow low gradient aortic stenosis as his EF is 20-25% (PV 2.48, MG 13, RAUDEL 0.63, DVI 0.20).     Cardiology team is having trouble weaning the balloon pump and the process.  Patient was on dobutamine and Levophed.  Levophed was stopped a little while ago.  He continues to be on low-dose dobutamine as well his balloon pump.    Structural cardiology team is consulted to evaluate possible benefit of BAV to help support his hemodynamics.    Plan:   At this stage it is unclear if patient indeed has severe aortic stenosis.  In order to further adjudicate severity of his aortic valve disease  we will do Noncon CT to look at the aortic valve calcium score.  That will also help us assess his groin access.    In addition, patient is delirious which can make any possible procedures performed on him difficult.    Looking at above data final recommendations will be made.      We discussed all the risks associated with the procedure, including but not limited to stroke, MI, pericardial tamponade, vascular complications, infection and death were discussed with the patient. The risk of needing a permament pacemaker was also discussed in detail. The patient verbalized understanding and decided to proceed with the procedure.     We will discuss this patient's case at our Valve Team meeting with representatives from Structural Heart and Cardiac Surgery. Our nurse navigators will contact patient with further diagnostic needs and formal plan.        ATTENDING ADDENDUM  Mr Milton is a 78 year old man with low flow, low gradient, low LVEF aortic stenosis.   To clarify if this is true severe aortic stenosis or pseudo severe he will be worked up further with a CT chest for aortic valve calcium score.  This is 473 Agaston units which is not severe ( severe in males 2000 Agaston units).  At this time balloon aortic valvuloplasty will not be offered.

## 2024-02-23 NOTE — PROGRESS NOTES
Subjective Data:  Patient seen this afternoon; IABP site with ooze since at least Wednesday; primary team and interventional fellow aware       Objective Data:  Last Recorded Vitals:  Vitals:    02/23/24 1600 02/23/24 1637 02/23/24 1652 02/23/24 1707   BP:  (!) 109/34 (!) 127/42 (!) 118/42   Pulse: (!) 112 97 110 108   Resp: 24 16 20 13   Temp:  36.2 °C (97.2 °F) 36 °C (96.8 °F) 36.2 °C (97.2 °F)   TempSrc:  Temporal Temporal    SpO2: 96% 96% 92% 92%   Weight:       Height:           Last Labs:  CBC - 2/23/2024:  5:19 AM  18.7 8.4 126    23.2      CMP - 2/23/2024:  5:19 AM  8.3 4.8 62 --- 1.3   3.9 2.8 154 59      PTT - 2/19/2024: 10:26 PM  1.2   13.5 >200     TROPHS   Date/Time Value Ref Range Status   02/19/2024 12:02 PM 3,463 0 - 53 ng/L Final     BNP   Date/Time Value Ref Range Status   02/19/2024 12:02 PM 1,905 0 - 99 pg/mL Final     HGBA1C   Date/Time Value Ref Range Status   02/19/2024 12:02 PM 5.4 see below % Final   07/09/2021 09:12 AM 5.2 % Final     Comment:          Diagnosis of Diabetes-Adults   Non-Diabetic: < or = 5.6%   Increased risk for developing diabetes: 5.7-6.4%   Diagnostic of diabetes: > or = 6.5%  .       Monitoring of Diabetes                Age (y)     Therapeutic Goal (%)   Adults:          >18           <7.0   Pediatrics:    13-18           <7.5                   7-12           <8.0                   0- 6            7.5-8.5   American Diabetes Association. Diabetes Care 33(S1), Jan 2010.       LDLCALC   Date/Time Value Ref Range Status   12/15/2023 10:28 AM 82 <=99 mg/dL Final     Comment:                                 Near   Borderline      AGE      Desirable  Optimal    High     High     Very High     0-19 Y     0 - 109     ---    110-129   >/= 130     ----    20-24 Y     0 - 119     ---    120-159   >/= 160     ----      >24 Y     0 -  99   100-129  130-159   160-189     >/=190     05/09/2020 04:24  65 - 130 MG/DL Final     VLDL   Date/Time Value Ref Range Status   12/15/2023  10:28 AM 17 0 - 40 mg/dL Final   09/20/2023 09:51 AM 14 0 - 40 mg/dL Final   06/30/2022 09:30 AM 19 0 - 40 mg/dL Final   01/18/2022 12:50 PM 21 0 - 40 mg/dL Final      Last I/O:  I/O last 3 completed shifts:  In: 1976.9 (18 mL/kg) [I.V.:1076.9 (9.8 mL/kg); IV Piggyback:900]  Out: 4310 (39.2 mL/kg) [Urine:4300 (1.1 mL/kg/hr); Blood:10]  Weight: 110 kg     Inpatient Medications:  Scheduled medications   Medication Dose Route Frequency    aspirin  81 mg oral Daily    clopidogrel  75 mg oral Daily    pantoprazole  40 mg oral Daily before breakfast    perflutren lipid microspheres  0.5-10 mL of dilution intravenous Once in imaging    perflutren protein A microsphere  0.5 mL intravenous Once in imaging    perflutren protein A microsphere  0.5 mL intravenous Once in imaging    piperacillin-tazobactam  2.25 g intravenous q6h    polyethylene glycol  17 g oral Daily    sennosides-docusate sodium  2 tablet oral BID    sulfur hexafluoride microsphr  2 mL intravenous Once in imaging    sulfur hexafluoride microsphr  2 mL intravenous Once in imaging    vancomycin  2,000 mg intravenous q24h     PRN medications   Medication    acetaminophen    dextrose 10 % in water (D10W)    dextrose    glucagon    heparin    melatonin    oxyCODONE    oxygen     Continuous Medications   Medication Dose Last Rate    DOBUTamine  2.5-20 mcg/kg/min 2.5 mcg/kg/min (02/23/24 1600)    heparin  0-4,500 Units/hr 1,600 Units/hr (02/23/24 1600)    norepinephrine  0.01-1 mcg/kg/min 0.03 mcg/kg/min (02/23/24 1600)     Physical Exam  Constitutional:       General: He is sleeping.      Appearance: He is ill-appearing.   Cardiovascular:      Rate and Rhythm: Tachycardia present. Rhythm irregular.      Pulses:           Dorsalis pedis pulses are detected w/ Doppler on the right side and detected w/ Doppler on the left side.      Comments: LCFA IABP in place, 1:1; oozing present   Pulmonary:      Breath sounds: Rhonchi present.   Skin:     Findings: Bruising  present.   Neurological:      Mental Status: He is easily aroused.       Assessment/Plan   Tesfaye Milton is a 78 y.o. male with PMH of CAD s/p multiple stents with known PCI to LAD and LCx (2011), Afib failed multiple DCCV, ablation and antiarrythmic therapies on Xarelto and rate control strategy, HFrEF (20-25% LVEF), mod AS, HTN, prostate cancer s/p brachytherapy (2022) who presents as a transfer from [facility] cardiogenic [facility].     2/19: LCFA IABP placed    2/20: IABP 1:1, augmentation 120. Checked positioning on CXR, confirmed with IC fellow is in stable position. remains on vaso, norepi; atrial fibrillation with RVR noted on tele    2/21: IABP 1:1 assisted BP on console 59/48 (60), augmentation 76. Checked positioning on CXR, appropriate placement. Remains on vasopressors. Access site reinforced, with pressure tap, no hematoma/bruising noted.     2/22: s/p PCI to LAD and Lcx; plan for DAPT for one year with asa and plavix     2/23: noted an ooze from balloon pump groin site, CICU and interventional fellow aware; IABP 1:1, assisted BP 71/48 (64), augmentation 86    Cardiogenic shock   Severe triple vessel disease  HFrEF 20-25%  - [facility] Cath: 2/19: severe pLAD lesion, as well as critical Lcx and RCA disease  - Intra- aortic balloon pump placed on 2/19 after transfer from [facility]  - TTE 2/13: LV EF 20-25%, multiple segmental abnormalities; impaired relaxation pattern of LV diastolic filling, moderate MR   - Current settings: 1:1  - Plan for weaning as hemodynamically tolerated    Recommendations:  - daily CXR with IABP  - please maintain strict bedrest while IABP in place  - closely monitor groin insertion site and distal pulses  - May elevate HOB no greater than 30 degrees  - May log roll patient side to side without flexing involved extremity  - Interventional cardiology will continue to follow, will defer primary care to CICU team     Code Status:  Full Code      Francia Jimenez, APRN-CNP

## 2024-02-23 NOTE — CARE PLAN
Problem: Safety - Medical Restraint  Goal: Remains free of injury from restraints (Restraint for Interference with Medical Device)  Flowsheets (Taken 2/23/2024 3351 by Francesco Alonzo RN)  Remains free of injury from restraints (restraint for interference with medical device): Every 2 hours: Monitor safety, psychosocial status, comfort, nutrition and hydration    Over the shift, the patient did not make progress toward the following goals. Barriers to progression include pt confused.

## 2024-02-23 NOTE — PROGRESS NOTES
Nephrology Progress Note   [facility]  February 23, 2024    Patient: Tesfaye Milton    Medical Record: [id number]    Subjective   Patient underwent PCI to LAD and LCX 2/22 without complication.  Diuresed with 80 IV lasix on return with 875 ml output. Around 23:00 patient started to appear diaphoretic with systolic pressures in the 70s and Wedge 17 CVP 4. Declined to MAPs in 60s at levo 0.12 and lactate to 2.6. Given 250cc bolus and started dobutamine. [place] this AM: Wedge 19 CVP 8 lact 2.5 on dobutamine 2.25, levo 0.03.     Patient seen at bedside this morning.  Reports that he feels about the same. Denies any fevers, chills, shortness of breath, chest pain, abdominal pain, nausea, vomiting.     Medications   Medications:   Scheduled medications  aspirin, 81 mg, oral, Daily  clopidogrel, 75 mg, oral, Daily  pantoprazole, 40 mg, oral, Daily before breakfast  perflutren lipid microspheres, 0.5-10 mL of dilution, intravenous, Once in imaging  perflutren protein A microsphere, 0.5 mL, intravenous, Once in imaging  perflutren protein A microsphere, 0.5 mL, intravenous, Once in imaging  piperacillin-tazobactam, 2.25 g, intravenous, q6h  polyethylene glycol, 17 g, oral, Daily  sennosides-docusate sodium, 2 tablet, oral, BID  sulfur hexafluoride microsphr, 2 mL, intravenous, Once in imaging  sulfur hexafluoride microsphr, 2 mL, intravenous, Once in imaging  vancomycin, 2,000 mg, intravenous, q24h      Continuous medications  DOBUTamine, 2.5-20 mcg/kg/min, Last Rate: 2.5 mcg/kg/min (02/23/24 0800)  heparin, 0-4,500 Units/hr, Last Rate: 1,600 Units/hr (02/23/24 0800)  norepinephrine, 0.01-1 mcg/kg/min, Last Rate: 0.03 mcg/kg/min (02/23/24 0800)      PRN medications  PRN medications: acetaminophen, dextrose 10 % in water (D10W), dextrose, glucagon, heparin, melatonin, oxyCODONE, oxygen     Objective   Vitals  Visit Vitals  BP (!) 157/31 Comment: ART LINE   Pulse 103   Temp 37.1 °C (98.8 °F)  (Temporal)   Resp 15        Intake/Output  I/O last 3 completed shifts:  In: 1976.9 (18 mL/kg) [I.V.:1076.9 (9.8 mL/kg); IV Piggyback:900]  Out: 4310 (39.2 mL/kg) [Urine:4300 (1.1 mL/kg/hr); Blood:10]  Weight: 110 kg     Physical Exam  Constitutional:       Appearance: Normal appearance.   Eyes:      Extraocular Movements: Extraocular movements intact.      Conjunctiva/sclera: Conjunctivae normal.   Neck:      Comments: [place] in left IJ  Cardiovascular:      Rate and Rhythm: Normal rate and regular rhythm.   Pulmonary:      Effort: Pulmonary effort is normal.      Breath sounds: Some coarse breath sounds noted bilaterally.   Abdominal:      General: Abdomen is flat. There is no distension.      Palpations: Abdomen is soft.      Tenderness: There is no abdominal tenderness.   Musculoskeletal:      Right lower leg: No edema.      Left lower leg: No edema.   Neurological:      Mental Status: He is alert and oriented to person, place, and time.   Psychiatric:         Mood and Affect: Mood normal.         Behavior: Behavior normal.     Labs  Results from last 7 days   Lab Units 02/23/24  0519 02/23/24  0004 02/22/24  0425   WBC AUTO x10*3/uL 18.7* 18.4* 14.5*   HEMOGLOBIN g/dL 8.4* 8.8* 9.4*   HEMATOCRIT % 23.2* 26.0* 26.6*   PLATELETS AUTO x10*3/uL 126* 121* 129*       Results from last 7 days   Lab Units 02/23/24  0519 02/23/24  0004 02/22/24  1743 02/22/24  0425 02/21/24  1726   SODIUM mmol/L 140 139 138 135* 135*   POTASSIUM mmol/L 4.2 4.1 4.0 4.3 4.4   CO2 mmol/L 29 27 31 27 32   ANION GAP mmol/L 17 18 13 14 8*   BUN mg/dL 74* 70* 71* 80* 80*   CREATININE mg/dL 2.31* 2.06* 2.19* 2.33* 2.38*   GLUCOSE mg/dL 143* 136* 122* 118* 109*   EGFR mL/min/1.73m*2 29* 33* 30* 28* 28*   MAGNESIUM mg/dL 2.30 2.14 2.23 2.37 2.59*   PHOSPHORUS mg/dL  --   --  3.9 4.5 4.8        Results from last 7 days   Lab Units 02/23/24  0519 02/23/24  0004 02/22/24  0425   ALT U/L 154* 167* 179*   AST U/L 62* 65* 61*   ALK PHOS U/L 59 61 61         Results from last 7 days   Lab Units 02/19/24  2226 02/19/24  1202   INR  1.2* 1.0       Results from last 7 days   Lab Units 02/23/24  0839 02/23/24  0633 02/23/24  0519 02/23/24  0144 02/22/24  2312 02/21/24  1152 02/21/24  1009 02/19/24  1704 02/19/24  1204   POCT PH, ARTERIAL pH  --   --   --   --  7.48*  --  7.48*  --  7.37*   POCT PO2, ARTERIAL mm Hg  --   --   --   --  62*  --  120*  --  117*   POCT PCO2, ARTERIAL mm Hg  --   --   --   --  43*  --  34*  --  42   FIO2 % 36 36 36   < > 36   < > 32   < > 33    < > = values in this interval not displayed.       Results from last 7 days   Lab Units 02/19/24  1912   POCT PH, VENOUS pH 7.37   POCT PCO2, VENOUS mm Hg 46       Results from last 7 days   Lab Units 02/19/24  1202   LACTATE mmol/L 1.8           Imaging  === 02/19/24 ===    XR CHEST 1 VIEW    - Impression -  1.  Interval placement of left Trialysis line with tip overlying the  mid SVC.  2. No change or interval improvement in interstitial pulmonary edema  and patchy bilateral infiltrates.  3.  Additional medical devices as described above.    I personally reviewed the images/study and I agree with Ria Asencio DO's (radiology resident) findings as stated. This study  was interpreted at [place], [place].    MACRO:  None    Signed by: Piotr Cramer 2/20/2024 10:02 AM  Dictation workstation:   [id number]   === 10/28/21 ===    CT CHEST SCREENING FOR LUNG CANCER    - Impression -  1. Small stable nodularities bilaterally. These may be related to scarring.  No new or enlarging nodules.  2. Severe atherosclerotic coronary calcifications and cardiomegaly  3. Mildly dilated main pulmonary artery similar to the prior exam can be  seen with pulmonary arterial hypertension..    RECOMMENDATIONS: Continued annual low dose CTis recommended.  Category: Lung-RADS Category 2 --  Nodules with a very low likelihood of  becoming a clinically active cancer due to size  or lack of growth.  Continue  annual screening with LDCT in 12 months.      Recommendations above based on NCCN guidelines Version 1.2021 for lung  cancer screening.    NOTE:  Nodule size measurements are mean diameters, the average of the  longest diameter of the nodule and its perpendicular diameter.      [id number]    This report has been produced using speech recognition.  This exam is available in DICOM format to non-affiliated healthcare  facilities on a secure media free searchable basis with prior patient  authorization.  The patient exposure is reported to a radiation dose index  registry.  All CT examinations are performed with one or more of the  following dose reduction techniques: Automated Exposure Control, Adjustment  of mA and/or KV according to patient size, or use of iterative  reconstruction techniques.    Original Interpreting Physician:   MYLES GARCIA MD  Original Transcribed by/Date: PSCB   Oct 28 2021  2:50P  Original Electronically Signed by/Date: MYLES GARCIA MD Oct 28 2021  2:50P    Addendum Interpreting Physician:  Addendum Transcribed by/Date: NO ADDENDUM  Addendum Electronically Signed by/Date:       Assessment/Plan   Tesfaye Milton is a 78 y.o. male with PMH of CAD s/p multiple stents with known PCI to LAD and LCx (2011), Afib failed multiple DCCV, ablation and antiarrythmic therapies on Xarelto and rate control strategy, HFrEF (20-25% LVEF), mod AS, HTN, prostate cancer s/p brachytherapy (2022) who presents as a transfer from [facility] in cardiogenic shock. Nephrology consulted for anuria and JEFF. Was not making urine since 2/19 6 pm (the time he arrived at ). Recommended lopez catheter placement, BMP BID and close monitoring for CRRT with potential to start within 24h. Etiology of worsening kidney function is multifactorial; CRS, cardiogenic shock, SHELLIE, Zosyn usage. Patient currently with hemodynamic monitoring with RHC and on norepinephrine, vaso stopped 2/20 AM. No signs of  significant volume overload on exam. Currently on 3L NC. Recommend to reduce intake of fluids as much as possible, prefer using isotonic solutions if giving fluids, No urgent indications for RRT at this time, but will continue close monitoring of hemodynamics and worsening O2 requirement which may necessitate earlier RRT. Had brief initation of CVVH night of 2/20 for c/f volume overlaod, however started making urine shortly after. CVVH stopped. Patient with improved UOP 2/21 of 1646cc. Having good UOP with diuresis. Had drop in MAPs 60-70s 2/22.      #JEFF Stage III, non-oliguric likely 2/2 ischemic tubular injury in the setting of cardiogenic shock   - Baseline creatinine: 0.9-1.0  - Electrolytes (Na, K, Ca, Phos): stable  - had drop in MAPs overnight to 60s-70s on 2/22 and started on dobutamine, given 250cc bolus. Continues on Levo and dobutamine.   - Anticipate rise in Cr given drop in MAPs   - Creatinine trends: 2.9>4.3>2.5>1.6>1.0>2.51 >2.84>3.09>2.92>2.33>2.06>2.31    - UOP 3135 2/22    - s/p PCI to LAD and LCX 2/22     Recommendations:  - Please maintain MAPs > 70  for renal perfusion   - No urgent indication for kidney replacement therapy.   - Please limit infusion of fluids as much as possible, recommend isotonic solutions if giving fluids   - BMP BID, continuing to monitor   - Avoid nephrotoxins, contrast if possible  - strict Is/Os  - Renal dosing for medications for latest eGFR, follow medication trough as appropriate     Thank you for this consult. Nephrology will continue to follow this patient. Discussed with attending nephrologist,  Dr. Gomes.     Belem Pat MD  24 hour Renal Pager - [phone]

## 2024-02-23 NOTE — CARE PLAN
The patient's goals for the shift include      The clinical goals for the shift include Pt will remain hemodynamically stable throughout shift.    Problem: Skin  Goal: Decreased wound size/increased tissue granulation at next dressing change  Outcome: Progressing  Flowsheets (Taken 2/23/2024 0651)  Decreased wound size/increased tissue granulation at next dressing change: Protective dressings over bony prominences  Goal: Participates in plan/prevention/treatment measures  Outcome: Progressing  Flowsheets (Taken 2/23/2024 0651)  Participates in plan/prevention/treatment measures:   Discuss with provider PT/OT consult   Elevate heels  Goal: Prevent/manage excess moisture  Outcome: Progressing  Flowsheets (Taken 2/23/2024 0651)  Prevent/manage excess moisture:   Use wicking fabric (obtain order)   Moisturize dry skin  Goal: Prevent/minimize sheer/friction injuries  Outcome: Progressing  Flowsheets (Taken 2/23/2024 0651)  Prevent/minimize sheer/friction injuries: Use pull sheet  Goal: Promote/optimize nutrition  Outcome: Progressing  Flowsheets (Taken 2/23/2024 0651)  Promote/optimize nutrition: Monitor/record intake including meals  Goal: Promote skin healing  Outcome: Progressing  Flowsheets (Taken 2/23/2024 0651)  Promote skin healing: Turn/reposition every 2 hours/use positioning/transfer devices     Problem: Safety - Medical Restraint  Goal: Remains free of injury from restraints (Restraint for Interference with Medical Device)  Outcome: Progressing  Flowsheets (Taken 2/23/2024 0651)  Remains free of injury from restraints (restraint for interference with medical device): Every 2 hours: Monitor safety, psychosocial status, comfort, nutrition and hydration

## 2024-02-23 NOTE — PROGRESS NOTES
Social Work Transitional Care Note:  - ICU TREATMENT PLAN: JEFF, remains in cardiogenic shock, on Pressors, delirium  - Insurance coverage: Medicare  - Support system: The patients spouse Misti is listed as NOK  - Planned Disposition: Pending medical outcome and rehab recommendations  - Barriers to Discharge: None at this time - Will continue to follow  -Anticipated Date of Discharge:  3/20/2024  CHERI Mullen, LSW

## 2024-02-23 NOTE — PROGRESS NOTES
"Tesfaye Milton is a 78 y.o. male on day 4 of admission presenting with Shock (CMS/HCC).    Subjective   Patient underwent PCI to LAD and LCX 2/22 without complication. Upon return to the unit last evening swan readings as follows:    2/22 @ 17:59: Wedge 21, CVP 7, CO/CI 5.8/2.4, , SvO2 54%, levo .06    Patient diuresed with 80 IV lasix on return with 875 ml output.     Around 23:00 patient started to appear diaphoretic with systolic pressures in the 70s.   Repeat [place]: Wedge 17, CVP 4, CO/CI 4.5/1.9, SVR 1087, SvO2 44%, , MAP 66, levo increased to 0.1, lactate 1.7    Patient continued to decline with MAPs in the 60s at levo 0.12, Svo2 39% and rise in lactate to 2.6. Decision made to wean levo with goal SBP of 100 for afterload reduction. Given 250 ml bolus, dobutamine started.     [place] numbers with minimal improvement this AM @ 6:43  Wedge 19, CVP 8, CO/CI 4.9/2.06, , SvO2 42%, lactate 2.5, levo 0.03, dobutamine 2.25, MAP 65,     Objective     Physical Exam   Constitutional: diaphoretic, laying in bed   Eyes: EOMI, clear sclera   ENMT: moist mucous membranes   Head/Neck: no appreciable JVD, swan in right internal jugular, TLC in left IJ   Respiratory/Thorax: clear to auscultation posteriorly , normal WOB on 2L O2   Cardiovascular: hushing from pulsatile IABP    Gastrointestinal: soft, NT, ND, +BS   : lopez in place w/ bloody urine    Extremities: BLE cool, IABP in left groin, B/L DP/PT are dopplerable. No LE edema or asymmetry   Neurological: oriented to person, place, year; waxing and waning delirium    Skin: cool and dry       Last Recorded Vitals  Blood pressure (!) 157/31, pulse (!) 129, temperature 35.6 °C (96.1 °F), resp. rate 16, height 1.905 m (6' 3\"), weight 110 kg (242 lb 8.1 oz), SpO2 99 %.  Intake/Output last 3 Shifts:  I/O last 3 completed shifts:  In: 2397.9 (21.3 mL/kg) [I.V.:1447.9 (12.9 mL/kg); IV Piggyback:950]  Out: 5185 (46.1 mL/kg) [Urine:5175 (1.3 mL/kg/hr); " Blood:10]  Weight: 112.5 kg     Relevant Results  Scheduled medications  aspirin, 81 mg, oral, Daily  clopidogrel, 75 mg, oral, Daily  lactated Ringer's, 250 mL, intravenous, Once  pantoprazole, 40 mg, oral, Daily before breakfast  piperacillin-tazobactam, 2.25 g, intravenous, q6h  polyethylene glycol, 17 g, oral, Daily  sennosides-docusate sodium, 2 tablet, oral, BID  vancomycin, 2,000 mg, intravenous, q24h      Continuous medications  DOBUTamine, 2.5-20 mcg/kg/min, Last Rate: 2.5 mcg/kg/min (02/23/24 0559)  heparin, 0-4,500 Units/hr, Last Rate: 1,600 Units/hr (02/23/24 0600)  norepinephrine, 0.01-1 mcg/kg/min, Last Rate: 0.03 mcg/kg/min (02/23/24 0500)      PRN medications  PRN medications: acetaminophen, dextrose 10 % in water (D10W), dextrose, glucagon, heparin, melatonin, oxyCODONE, oxygen     Lab Results   Component Value Date    WBC 18.7 (H) 02/23/2024    HGB 8.4 (L) 02/23/2024    HCT 23.2 (L) 02/23/2024     (L) 02/23/2024    CHOL 147 12/15/2023    TRIG 86 12/15/2023    HDL 47.6 12/15/2023     (H) 02/23/2024    AST 65 (H) 02/23/2024     02/23/2024    K 4.1 02/23/2024    CL 98 02/23/2024    CREATININE 2.06 (H) 02/23/2024    BUN 70 (H) 02/23/2024    CO2 27 02/23/2024    TSH 0.82 12/15/2023    PSA 7.7 (H) 08/12/2020    INR 1.2 (H) 02/19/2024    HGBA1C 5.4 02/19/2024         Assessment/Plan   Principal Problem:    Shock (CMS/HCC)  Active Problems:    Atherosclerotic heart disease of native coronary artery without angina pectoris    Hypercholesterolemia    Shortness of breath    UTI (urinary tract infection)    Atrial flutter (CMS/HCC)    Cardiomyopathy (CMS/HCC)    Chronic atrial fibrillation (CMS/HCC)    Persistent atrial fibrillation (CMS/HCC)    Cigarette smoker    Essential hypertension    Heart failure (CMS/HCC)    History of coronary artery stent placement    Myocardial infarction (CMS/HCC)    Congestive heart failure (CMS/HCC)    Aortic stenosis    Cardiogenic shock (CMS/HCC)    NSTEMI  (non-ST elevated myocardial infarction) (CMS/[facility])  Mr Tesfaye Milton is a 78 y.o. male with PMH of CAD s/p PCI to LAD and LCx (2011), Afib failed multiple DCCV, ablation and antiarrythmic therapies on Xarelto and rate control strategy, HFrEF (20-25% LVEF), mod AS, HTN, prostate cancer s/p brachytherapy (2022) who presents as a transfer from [facility] in cardiogenic shock. Initially presented with SOB, hypotension and hypo perfusion with elevated lactate, JEFF and elevated transaminitis. EKG showed SHYAM III, aVF and ST depressions in I, II, V4-V6. Initially managed with ionotropic support with some improvement in renal and liver function. Additionally treated for sepsis with with antibiotics. LHC on 2/19 demonstrated triple vessel disease and TTE 2/13 showed known LVEF 20-25% with global hypokinesia, aortic valve with 0.6 RAUDEL, PG/MG of 25/13. Transferred to [facility] CICU for further management of shock and coronary revascularization.  On arrival was mentating well but he was cool and dry, and is anuric with elevated Cr to 2.51. [place] numbers demonstrated elevated filling pressures and low cardio index despite pharmacologic support with dobutamine and levophed 0.16. Presentation was consistent with SCAI Stage D shock, and  escalated to MCS with IABP placement on 2/19. Currently proceeded with swan guided management and  he continues to require pharmacological support for shock. Plan to consider high risk complex coronary intervention by Dr Banerjee as he would not be a surgical candidate. Renal following for possible CVVH given JEFF. Now producing urine w/ diuresis. Underwent revascularization with PCI to the LAD and LCX 2/22 without complication. Overnight patient decompensated with systolic BP in the 70s, Svo2 in the 30s, lactate elevation to 2.5. Started on dobutamine. Plan to escalate to shock call should he continue to decompensate.      Updates 2/23  -cont dobutamine, wean levo  -swan numbers q2-4  -consult HF for  consideration of advanced therapies (LVAD)  -consult structural for consideration of BAV   -repeat BCX  -AM cortisol     Plan:  NEURO/PSYCH: JONAH     PULM:   #Acute hypoxic respiratory failure  DDx: volume overload/ADHF and cannot r/o PNA  CXR 2/22: persistent L>R b/l infiltrates representing edema, infection or atelectasis   MRSA negative (2/16); Repeat (2/21) negative  Plan:  -S/p diuresis (bumex 4mg IV 2/20; 4mg IV 2/21)  -Lasix 80 mg IV (2/22)  -c/w zosyn (2/18- )  -starting Vanc (2/21 - ) for HAP coverage w/ worsening consolidation on CXR   -O2 at 3L, wean as tolerated     CV:   #HFrEF (LVEF 20-25%)  #CAD s/p multiple stents with known PCI to LAD and LCx (2011)  #NSTEMI  #Mod AS   ::RAUDEL 0.6, PG/MG of 25/13, calcified AV leaflets with poor opening of the valve c/f LFLG  ::[place] on presentation: CVP (not readable/clotted), PA 36/20(16) and CO/CI 6.3/2.6. MVO2 72%, SVR CNR  ::Patient decompensated overnight 2/22 with worsening shock; dobutamine added, may consider shock call should pt continue to decline   ::Goal of systolic BP of 100 on A line   Plan:  -Hold home metoprolol, entresto while in shock  -c/w asa, Plavix  -Start statin once transaminitis resolved  -c/w heparin gtt  -c/w levo, wean as tolerated   -c/w dobutamine (started 2/23), wean as tolerated   -[place] q2-4hours  -IABP placed in cath lab 12/19 PM; set 1:1   -s/p PCI to LAD/LCX 2/22 without complication   -consider shock call     #Afib  No beta block   -c/w heparin gtt     GI:   #Shock liver, resolving  AST 1488>375>214>106> 77>62  ALT 1073>886>452>314> 238>154  -CTM     #GI ppx  -cont pantoprazole    RENAL:   #JEFF  Cr: 2.9>4.3>2.5>1.6>1.0>2.51>2.9>2.3  Renal US 2/14 normal  Multifactorial etiology: contrast use, shock leading to ATN  Was anuric: now producing urine s/p 4 mg IV bumex 2/20  Plan:  -RFP h64dialq  -Renal following, appreciate recs   -Strict I/O, renally dose meds  -Maintain MAP > 65 for renal perfusion  -lopez in place      #prostate cancer  s/p brachytherapy  Not active. Not on tamsulosin or mirabegron      HEME/ONC:   #thrombocytopenia      ID:   #PNA  #c/f septic shock  White count  BCx 2/13 x 2 negative  MRSA negative (2/16); UCx 2/13: negative  s/p CTX 2/13-2/17  Plan  -c/w zosyn (2/18 - )  -add vanc (2/21 - )  -f/u repeat BCx x 2 2/19  -repeat MRSA nares (2/21) negative     ENDO:  #Hyperglycemia - resolved   In setting of steroid use for COPD exacerbation  -A1C 5.4     N: cardiac  GI: pantoprazole  DVT: heparin gtt  A: Nasrin, nik chavez, trialysis      NOK: Wife Misti [phone] (home) and [phone](mobile)  FULL code (confirmed on admission)           Shahriar Damon MD

## 2024-02-23 NOTE — CONSULTS
Inpatient consult to Heart Failure  Consult performed by: Chandrakant Pierre MD  Consult ordered by: Jose Pimentel DO  Reason for consult: Evaluate for advanced therapy; HFrEF in cardiogenic shock requiring IABP, Levophed and Dobutamine      History Of Present Illness:    Tesfaye Milton is a 78 y.o. male with PMH of CAD s/p multiple stents with known PCI to LAD and LCx (2011), Afib failed multiple DCCV, ablation and antiarrythmic therapies, currently on anticoagulation, HFrEF (20-25% LVEF), moderate AS, HTN, prostate cancer s/p brachytherapy (2022), presenting with cardiogenic shock.    Patient initially presented to OSH (2/13) with SOB and hypotension after being referred by primary cardiologist. He also has lower extremity edema and cough productive of yellow sputum. On admission, initial EKG showed ST elevation in III, aVR, with reciprocal ST depression in I, II, aVL, V4-V6. Lactate elevated (5.8), troponin (1,236). He was admitted with cardiogenic shock and started on Dobutamine and added Levophed. He was also treated for presumed UTI and started on Zosyn. LHC (2/19) showed multivessel disease. He had worsening renal function, oliguria and ongoing shock requiring up-titration of pressors with dobutamine to 7.5 and norepi to 0.16.  He was transferred to [facility].    LHC (02/22) revealed 80% stenosis of the mid left anterior descending coronary artery, 80% stenosis of the mid circumflex. S/p ALICIA to LAD, Lcx (02/22/2024)     Back in the CICU, patient had worsening cardiac index and had lactic acidemia. On 02/23 @ 0535, SG#: MAP 83, CVP 5, PAP 53/16 (28), PCWP 14, CO 4.36/CI 1.83, SvO2 37%, while on Levophed 0.02 and IABP 1:1.    On 02/23 @ 0851, SG#: MAP 60, CVP 8, PAP 51/25 (32), PCWP 23, CO 6.80, CI 2.86 while on Levophed @ 0.03 and Dobutamine @ 2.25 mcg and IABP 1:1 Lactate was noted to be elevated at 2.4    On 02/23 @ 1133, SG# MAP 62, CVP 5, PAP 45/22 (30), PCWP 20, CO 4.91, CI 2.06, SvO2 42% while on  Dobutamine @ 2.5 mcg and IABP 1:1. She was put back on Levophed gtt    As per wife, patient smokes 1/2 pack per day for the past 20 years. He would get 2 drinks of alcohol/day.     Last Recorded Vitals:  Vitals:    02/23/24 0900 02/23/24 1000 02/23/24 1100 02/23/24 1200   BP:       BP Location:       Pulse: (!) 117 103 105 103   Resp: (!) 32 15 14 (!) 28   Temp:    36.5 °C (97.7 °F)   TempSrc:    Temporal   SpO2: 95% (!) 88% 95% 91%   Weight:       Height:         Last Labs:  CBC - 2/23/2024:  5:19 AM  18.7 8.4 126    23.2      CMP - 2/23/2024:  5:19 AM  8.3 4.8 62 --- 1.3   3.9 2.8 154 59      PTT - 2/19/2024: 10:26 PM  1.2   13.5 >200     Troponin I, High Sensitivity   Date/Time Value Ref Range Status   02/19/2024 12:02 PM 3,463 (HH) 0 - 53 ng/L Final     BNP   Date/Time Value Ref Range Status   02/19/2024 12:02 PM 1,905 (H) 0 - 99 pg/mL Final     Hemoglobin A1C   Date/Time Value Ref Range Status   02/19/2024 12:02 PM 5.4 see below % Final   07/09/2021 09:12 AM 5.2 % Final     Comment:          Diagnosis of Diabetes-Adults   Non-Diabetic: < or = 5.6%   Increased risk for developing diabetes: 5.7-6.4%   Diagnostic of diabetes: > or = 6.5%  .       Monitoring of Diabetes                Age (y)     Therapeutic Goal (%)   Adults:          >18           <7.0   Pediatrics:    13-18           <7.5                   7-12           <8.0                   0- 6            7.5-8.5   American Diabetes Association. Diabetes Care 33(S1), Jan 2010.       LDL Calculated   Date/Time Value Ref Range Status   12/15/2023 10:28 AM 82 <=99 mg/dL Final     Comment:                                 Near   Borderline      AGE      Desirable  Optimal    High     High     Very High     0-19 Y     0 - 109     ---    110-129   >/= 130     ----    20-24 Y     0 - 119     ---    120-159   >/= 160     ----      >24 Y     0 -  99   100-129  130-159   160-189     >/=190     05/09/2020 04:24  (H) 65 - 130 MG/DL Final     VLDL   Date/Time Value  Ref Range Status   12/15/2023 10:28 AM 17 0 - 40 mg/dL Final   09/20/2023 09:51 AM 14 0 - 40 mg/dL Final   06/30/2022 09:30 AM 19 0 - 40 mg/dL Final   01/18/2022 12:50 PM 21 0 - 40 mg/dL Final      Last I/O:  I/O last 3 completed shifts:  In: 1976.9 (18 mL/kg) [I.V.:1076.9 (9.8 mL/kg); IV Piggyback:900]  Out: 4310 (39.2 mL/kg) [Urine:4300 (1.1 mL/kg/hr); Blood:10]  Weight: 110 kg     Past Cardiology Tests (Last 3 Years):  EKG:  Electrocardiogram 12 Lead 02/22/2024  A-fib with PVC, incomplete LBBB,  ms    Echo:  Transthoracic Echo (TTE) Limited 02/23/2024  1. Left ventricular systolic function is severely decreased with a 20-25% estimated ejection fraction.   2. Mid anteroseptal segment, mid inferolateral segment, and mid inferior segment are abnormal.   3. The left atrium is severely dilated.   4. The right atrium is moderately dilated.   5. The aortic valve appears stenotic , however was poorly visualized and Doppler evaluation was not performed.   6. There is global hypokinesis of the left ventricle with minor regional variations.    Transthoracic Echo (TTE) Limited 02/23/2024   1. Left ventricular systolic function is severely decreased with a 20-25% estimated ejection fraction.   2. Mid anteroseptal segment is abnormal.   3. There is mildly reduced right ventricular systolic function.   4. The left atrium is moderately dilated.   5. The right atrium is moderately dilated.   6. Very limited echocardiogram performed overnight . Complete echocardiogram to follow for a more detailed and accurate evaluation.    Transthoracic Echo (TTE) Complete 02/13/2024  1. Left ventricular systolic function is severely decreased with a 20-25% estimated ejection fraction.   2. Multiple segmental abnormalities exist. See findings.   3. Spectral Doppler shows an impaired relaxation pattern of left ventricular diastolic filling.   4. There is mildly reduced right ventricular systolic function.   5. The left atrium is moderately  dilated.   6. The right atrium is moderately dilated.   7. Moderate mitral valve regurgitation.   8. Mild to moderate tricuspid regurgitation.   9. Moderately elevated right ventricular systolic pressure.     Ejection Fractions:  EF   Date/Time Value Ref Range Status   02/13/2024 02:34 PM 27 %      Cath:  Cardiac catheterization - coronary 02/22/2024  Angiography reveals a 80% stenosis of the mid left anterior descending coronary artery. Pre-intervention TONY flow was 3. Percutaneous coronary intervention was performed within the mid left anterior descending. The stenosis was successfully reduced from 80% to <10%. Post-intervention TONY flow was 3.     Angiography reveals an 80% stenosis of the mid circumflex. Pre-intervention TONY flow was 3. Percutaneous coronary intervention was performed within the mid circumflex. The stenosis was successfully reduced from 80% to <10%. Post-intervention TONY flow was 3.    1. Successful IVUS guided Newton Nemacolin ALICIA of the LAD.   2. Successful IVUS guided Oswaldo Nemacolin ALICIA of the circumflex artery.    Past Medical History:  He has a past medical history of Anxiety disorder, unspecified, Atrial fibrillation (CMS/[facility]), CAD (coronary artery disease), CHF (congestive heart failure) (CMS/[facility]), High cholesterol, Hypertension, Long term (current) use of anticoagulants, Other conditions influencing health status, Personal history of other diseases of male genital organs, Personal history of other diseases of the circulatory system (07/26/2013), Personal history of other endocrine, nutritional and metabolic disease (07/26/2013), and Prostate disorder.    Past Surgical History:  He has a past surgical history that includes Umbilical hernia repair (04/08/2013); Other surgical history (04/08/2013); Appendectomy (04/08/2013); Knee arthroscopy w/ debridement (04/08/2013); Cardiac catheterization (N/A, 2/19/2024); and Cardiac catheterization (N/A, 2/19/2024).      Social History:  He reports  that he has quit smoking. His smoking use included cigarettes. He smoked an average of .25 packs per day. He has never used smokeless tobacco. He reports that he does not currently use alcohol after a past usage of about 7.0 standard drinks of alcohol per week. He reports that he does not use drugs.    Family History:  Family History   Problem Relation Name Age of Onset    Stroke Mother      Dementia Mother      Stroke Father          Allergies:  Patient has no known allergies.    Inpatient Medications:  Scheduled medications   Medication Dose Route Frequency    aspirin  81 mg oral Daily    clopidogrel  75 mg oral Daily    pantoprazole  40 mg oral Daily before breakfast    perflutren lipid microspheres  0.5-10 mL of dilution intravenous Once in imaging    perflutren protein A microsphere  0.5 mL intravenous Once in imaging    perflutren protein A microsphere  0.5 mL intravenous Once in imaging    piperacillin-tazobactam  2.25 g intravenous q6h    polyethylene glycol  17 g oral Daily    sennosides-docusate sodium  2 tablet oral BID    sulfur hexafluoride microsphr  2 mL intravenous Once in imaging    sulfur hexafluoride microsphr  2 mL intravenous Once in imaging    vancomycin  2,000 mg intravenous q24h     PRN medications   Medication    acetaminophen    dextrose 10 % in water (D10W)    dextrose    glucagon    heparin    melatonin    oxyCODONE    oxygen     Continuous Medications   Medication Dose Last Rate    DOBUTamine  2.5-20 mcg/kg/min 2.5 mcg/kg/min (02/23/24 1200)    heparin  0-4,500 Units/hr 1,600 Units/hr (02/23/24 1200)    norepinephrine  0.01-1 mcg/kg/min 0.03 mcg/kg/min (02/23/24 1200)     Outpatient Medications:  Current Outpatient Medications   Medication Instructions    clopidogrel (PLAVIX) 75 mg, oral, Daily    metoprolol succinate XL (TOPROL-XL) 100 mg, oral, Daily, Do not crush or chew.    metoprolol tartrate (LOPRESSOR) 50 mg, oral, 3 times daily, For 90 days    mirabegron (Myrbetriq) 25 mg tablet  extended release 24 hr 24 hr tablet     nitroglycerin (Nitrostat) 0.4 mg SL tablet sublingual, may be repeated every 5 minutes up to a maximum of 3 doses in a 15 minute period<BR>    rosuvastatin (CRESTOR) 10 mg, oral, Daily    sacubitriL-valsartan (Entresto)  mg tablet 1 tablet, oral, 2 times daily    tamsulosin (Flomax) 0.4 mg 24 hr capsule     Xarelto 20 mg, oral, Daily     Physical Exam:  Constitutional:       General: Confused, disoriented, without acute distress   HENT:      Head: Normocephalic and atraumatic.   Eyes:      Conjunctiva/sclera: Conjunctivae normal.   Cardiovascular:      Rate and Rhythm: Tachycardic and irregularly, irregular rhythm.      Heart sounds: Normal heart sounds. +4/6 holosystolic blowing murmur at right 2nd ICS, +IABP hums     Extremities: 2+ pulses in bilateral radial, DP and PT arteries. Capillary refill normal in all digits.   Pulmonary:      Breath sounds: Coarse breath sounds bilaterally. No wheezing or rales. No cough.  Abdominal:      General: There is no distension. Active bowel sounds.      Palpations: Abdomen is soft and non-tender.  Skin:     General: Skin is warm and dry.  Neurological:      Mental Status: oriented to person and time. Mental status is delirious  Psychiatric:         Disoriented, +hallucinations     Assessment/Plan   77 y/o Male with CAD s/p multiple stents with known PCI to LAD and LCx (2011), Afib failed multiple DCCV, ablation and antiarrythmic therapies, currently on anticoagulation, HFrEF (20-25% LVEF), moderate AS, HTN, prostate cancer s/p brachytherapy (2022), presenting with cardiogenic shock. LHC showed multivessel disease. S/p ALICIA to LAD and Lcx (02/22/2024). Post-procedure, he continued to have low cardiac indices and lactic acidemia, requiring IABP + Dobutamine and Levophed gtts. He is currently confused, delirious. He has also ongoing JEFF. His CT chest is concerning for bilateral consolidation. He remains in cardiogenic shock, possibly  related to valvular disease    Patient is not eligible for LVAD or cardiac transplant at this time.    Patient was evaluated and assessed at the bedside with HF service attending, Dr. KAROLINE Pena.     Discussed with CICU team.     Signed:    Chandrakant Pierre MD, PGY-4  Heart Failure Fellow

## 2024-02-24 ENCOUNTER — APPOINTMENT (OUTPATIENT)
Dept: RADIOLOGY | Facility: HOSPITAL | Age: 79
DRG: 270 | End: 2024-02-24
Payer: MEDICARE

## 2024-02-24 ENCOUNTER — APPOINTMENT (OUTPATIENT)
Dept: CARDIOLOGY | Facility: HOSPITAL | Age: 79
DRG: 270 | End: 2024-02-24
Payer: MEDICARE

## 2024-02-24 LAB
ALBUMIN SERPL BCP-MCNC: 2.4 G/DL (ref 3.4–5)
ALBUMIN SERPL BCP-MCNC: 2.7 G/DL (ref 3.4–5)
ALBUMIN SERPL BCP-MCNC: 2.8 G/DL (ref 3.4–5)
ALBUMIN SERPL BCP-MCNC: 2.8 G/DL (ref 3.4–5)
ALP SERPL-CCNC: 58 U/L (ref 33–136)
ALP SERPL-CCNC: 60 U/L (ref 33–136)
ALP SERPL-CCNC: 64 U/L (ref 33–136)
ALT SERPL W P-5'-P-CCNC: 115 U/L (ref 10–52)
ALT SERPL W P-5'-P-CCNC: 264 U/L (ref 10–52)
ALT SERPL W P-5'-P-CCNC: 306 U/L (ref 10–52)
ANION GAP BLDA CALCULATED.4IONS-SCNC: 12 MMO/L (ref 10–25)
ANION GAP BLDA CALCULATED.4IONS-SCNC: 16 MMO/L (ref 10–25)
ANION GAP BLDA CALCULATED.4IONS-SCNC: 18 MMO/L (ref 10–25)
ANION GAP BLDMV CALCULATED.4IONS-SCNC: 10 MMO/L (ref 10–25)
ANION GAP BLDMV CALCULATED.4IONS-SCNC: 14 MMO/L (ref 10–25)
ANION GAP BLDMV CALCULATED.4IONS-SCNC: 17 MMO/L (ref 10–25)
ANION GAP BLDMV CALCULATED.4IONS-SCNC: 18 MMO/L (ref 10–25)
ANION GAP BLDMV CALCULATED.4IONS-SCNC: 9 MMO/L (ref 10–25)
ANION GAP SERPL CALC-SCNC: 17 MMOL/L (ref 10–20)
ANION GAP SERPL CALC-SCNC: 22 MMOL/L (ref 10–20)
ANION GAP SERPL CALC-SCNC: 26 MMOL/L
APTT PPP: 67 SECONDS (ref 27–38)
AST SERPL W P-5'-P-CCNC: 280 U/L (ref 9–39)
AST SERPL W P-5'-P-CCNC: 319 U/L (ref 9–39)
AST SERPL W P-5'-P-CCNC: 56 U/L (ref 9–39)
BACTERIA BLD CULT: NORMAL
BACTERIA BLD CULT: NORMAL
BASE EXCESS BLDA CALC-SCNC: -0.9 MMOL/L (ref -2–3)
BASE EXCESS BLDA CALC-SCNC: 2.5 MMOL/L (ref -2–3)
BASE EXCESS BLDA CALC-SCNC: 3.8 MMOL/L (ref -2–3)
BASE EXCESS BLDA CALC-SCNC: 4.5 MMOL/L (ref -2–3)
BASE EXCESS BLDMV CALC-SCNC: -0.7 MMOL/L (ref -2–3)
BASE EXCESS BLDMV CALC-SCNC: 2.8 MMOL/L (ref -2–3)
BASE EXCESS BLDMV CALC-SCNC: 3.3 MMOL/L (ref -2–3)
BASE EXCESS BLDMV CALC-SCNC: 5.4 MMOL/L (ref -2–3)
BASE EXCESS BLDMV CALC-SCNC: 6.1 MMOL/L (ref -2–3)
BASOPHILS # BLD AUTO: 0.03 X10*3/UL (ref 0–0.1)
BASOPHILS NFR BLD AUTO: 0.1 %
BILIRUB DIRECT SERPL-MCNC: 0.7 MG/DL (ref 0–0.3)
BILIRUB SERPL-MCNC: 1.4 MG/DL (ref 0–1.2)
BILIRUB SERPL-MCNC: 1.7 MG/DL (ref 0–1.2)
BILIRUB SERPL-MCNC: 1.8 MG/DL (ref 0–1.2)
BLOOD EXPIRATION DATE: NORMAL
BODY TEMPERATURE: 37 DEGREES CELSIUS
BUN SERPL-MCNC: 103 MG/DL (ref 6–23)
BUN SERPL-MCNC: 107 MG/DL (ref 6–23)
BUN SERPL-MCNC: 92 MG/DL (ref 6–23)
CA-I BLDA-SCNC: 0.98 MMOL/L (ref 1.1–1.33)
CA-I BLDA-SCNC: 1.03 MMOL/L (ref 1.1–1.33)
CA-I BLDA-SCNC: 1.06 MMOL/L (ref 1.1–1.33)
CA-I BLDMV-SCNC: 0.99 MMOL/L (ref 1.1–1.33)
CA-I BLDMV-SCNC: 1.02 MMOL/L (ref 1.1–1.33)
CA-I BLDMV-SCNC: 1.08 MMOL/L (ref 1.1–1.33)
CA-I BLDMV-SCNC: 1.09 MMOL/L (ref 1.1–1.33)
CA-I BLDMV-SCNC: 1.12 MMOL/L (ref 1.1–1.33)
CALCIUM SERPL-MCNC: 8.1 MG/DL (ref 8.6–10.6)
CALCIUM SERPL-MCNC: 8.5 MG/DL (ref 8.6–10.6)
CALCIUM SERPL-MCNC: 8.6 MG/DL (ref 8.6–10.6)
CARDIAC TROPONIN I PNL SERPL HS: 1859 NG/L (ref 0–53)
CARDIAC TROPONIN I PNL SERPL HS: 2564 NG/L (ref 0–53)
CHLORIDE BLD-SCNC: 100 MMOL/L (ref 98–107)
CHLORIDE BLD-SCNC: 95 MMOL/L (ref 98–107)
CHLORIDE BLD-SCNC: 96 MMOL/L (ref 98–107)
CHLORIDE BLD-SCNC: 97 MMOL/L (ref 98–107)
CHLORIDE BLD-SCNC: 97 MMOL/L (ref 98–107)
CHLORIDE BLDA-SCNC: 100 MMOL/L (ref 98–107)
CHLORIDE BLDA-SCNC: 94 MMOL/L (ref 98–107)
CHLORIDE BLDA-SCNC: 97 MMOL/L (ref 98–107)
CHLORIDE SERPL-SCNC: 94 MMOL/L (ref 98–107)
CHLORIDE SERPL-SCNC: 95 MMOL/L (ref 98–107)
CHLORIDE SERPL-SCNC: 98 MMOL/L (ref 98–107)
CO2 SERPL-SCNC: 22 MMOL/L (ref 21–32)
CO2 SERPL-SCNC: 28 MMOL/L (ref 21–32)
CO2 SERPL-SCNC: 30 MMOL/L (ref 21–32)
CORTIS AM PEAK SERPL-MSCNC: 99.8 UG/DL (ref 5–20)
CREAT SERPL-MCNC: 2.79 MG/DL (ref 0.5–1.3)
CREAT SERPL-MCNC: 3.2 MG/DL (ref 0.5–1.3)
CREAT SERPL-MCNC: 3.61 MG/DL (ref 0.5–1.3)
DISPENSE STATUS: NORMAL
EGFRCR SERPLBLD CKD-EPI 2021: 17 ML/MIN/1.73M*2
EGFRCR SERPLBLD CKD-EPI 2021: 19 ML/MIN/1.73M*2
EGFRCR SERPLBLD CKD-EPI 2021: 23 ML/MIN/1.73M*2
EJECTION FRACTION APICAL 4 CHAMBER: 31.9
EOSINOPHIL # BLD AUTO: 0 X10*3/UL (ref 0–0.4)
EOSINOPHIL # BLD AUTO: 0 X10*3/UL (ref 0–0.4)
EOSINOPHIL # BLD AUTO: 0.01 X10*3/UL (ref 0–0.4)
EOSINOPHIL NFR BLD AUTO: 0 %
ERYTHROCYTE [DISTWIDTH] IN BLOOD BY AUTOMATED COUNT: 15.7 % (ref 11.5–14.5)
ERYTHROCYTE [DISTWIDTH] IN BLOOD BY AUTOMATED COUNT: 16.3 % (ref 11.5–14.5)
ERYTHROCYTE [DISTWIDTH] IN BLOOD BY AUTOMATED COUNT: 16.8 % (ref 11.5–14.5)
FIBRINOGEN PPP-MCNC: 778 MG/DL (ref 200–400)
FLUAV RNA RESP QL NAA+PROBE: NOT DETECTED
FLUBV RNA RESP QL NAA+PROBE: NOT DETECTED
GLUCOSE BLD-MCNC: 142 MG/DL (ref 74–99)
GLUCOSE BLD-MCNC: 175 MG/DL (ref 74–99)
GLUCOSE BLD-MCNC: 241 MG/DL (ref 74–99)
GLUCOSE BLD-MCNC: 265 MG/DL (ref 74–99)
GLUCOSE BLD-MCNC: 298 MG/DL (ref 74–99)
GLUCOSE BLDA-MCNC: 148 MG/DL (ref 74–99)
GLUCOSE BLDA-MCNC: 283 MG/DL (ref 74–99)
GLUCOSE BLDA-MCNC: 304 MG/DL (ref 74–99)
GLUCOSE SERPL-MCNC: 139 MG/DL (ref 74–99)
GLUCOSE SERPL-MCNC: 248 MG/DL (ref 74–99)
GLUCOSE SERPL-MCNC: 285 MG/DL (ref 74–99)
HCO3 BLDA-SCNC: 24.8 MMOL/L (ref 22–26)
HCO3 BLDA-SCNC: 26.5 MMOL/L (ref 22–26)
HCO3 BLDA-SCNC: 27.7 MMOL/L (ref 22–26)
HCO3 BLDA-SCNC: 31 MMOL/L (ref 22–26)
HCO3 BLDMV-SCNC: 27.2 MMOL/L (ref 22–26)
HCO3 BLDMV-SCNC: 27.4 MMOL/L (ref 22–26)
HCO3 BLDMV-SCNC: 27.8 MMOL/L (ref 22–26)
HCO3 BLDMV-SCNC: 30.5 MMOL/L (ref 22–26)
HCO3 BLDMV-SCNC: 32.4 MMOL/L (ref 22–26)
HCT VFR BLD AUTO: 21.5 % (ref 41–52)
HCT VFR BLD AUTO: 23.5 % (ref 41–52)
HCT VFR BLD AUTO: 25 % (ref 41–52)
HCT VFR BLD EST: 23 % (ref 41–52)
HCT VFR BLD EST: 23 % (ref 41–52)
HCT VFR BLD EST: 24 % (ref 41–52)
HCT VFR BLD EST: 25 % (ref 41–52)
HCT VFR BLD EST: 26 % (ref 41–52)
HCT VFR BLD EST: 28 % (ref 41–52)
HGB BLD-MCNC: 7.9 G/DL (ref 13.5–17.5)
HGB BLD-MCNC: 8 G/DL (ref 13.5–17.5)
HGB BLD-MCNC: 8.4 G/DL (ref 13.5–17.5)
HGB BLDA-MCNC: 8.3 G/DL (ref 13.5–17.5)
HGB BLDA-MCNC: 8.8 G/DL (ref 13.5–17.5)
HGB BLDA-MCNC: 9.2 G/DL (ref 13.5–17.5)
HGB BLDMV-MCNC: 7.6 G/DL (ref 13.5–17.5)
HGB BLDMV-MCNC: 7.8 G/DL (ref 13.5–17.5)
HGB BLDMV-MCNC: 8.1 G/DL (ref 13.5–17.5)
HGB BLDMV-MCNC: 8.6 G/DL (ref 13.5–17.5)
HGB BLDMV-MCNC: 8.7 G/DL (ref 13.5–17.5)
HGB RETIC QN: 35 PG (ref 28–38)
IMM GRANULOCYTES # BLD AUTO: 0.33 X10*3/UL (ref 0–0.5)
IMM GRANULOCYTES # BLD AUTO: 0.57 X10*3/UL (ref 0–0.5)
IMM GRANULOCYTES # BLD AUTO: 0.62 X10*3/UL (ref 0–0.5)
IMM GRANULOCYTES NFR BLD AUTO: 1.5 % (ref 0–0.9)
IMM GRANULOCYTES NFR BLD AUTO: 2.2 % (ref 0–0.9)
IMM GRANULOCYTES NFR BLD AUTO: 3.1 % (ref 0–0.9)
IMMATURE RETIC FRACTION: 44.1 %
INHALED O2 CONCENTRATION: 100 %
INHALED O2 CONCENTRATION: 100 %
INHALED O2 CONCENTRATION: 40 %
INHALED O2 CONCENTRATION: 50 %
INHALED O2 CONCENTRATION: 50 %
INR PPP: 1.2 (ref 0.9–1.1)
LACTATE BLDA-SCNC: 2.8 MMOL/L (ref 0.4–2)
LACTATE BLDA-SCNC: 8 MMOL/L (ref 0.4–2)
LACTATE BLDA-SCNC: 8.6 MMOL/L (ref 0.4–2)
LACTATE BLDMV-SCNC: 1.5 MMOL/L (ref 0.4–2)
LACTATE BLDMV-SCNC: 1.7 MMOL/L (ref 0.4–2)
LACTATE BLDMV-SCNC: 4 MMOL/L (ref 0.4–2)
LACTATE BLDMV-SCNC: 5.9 MMOL/L (ref 0.4–2)
LACTATE BLDMV-SCNC: 8.8 MMOL/L (ref 0.4–2)
LDH SERPL L TO P-CCNC: 1421 U/L (ref 84–246)
LDH SERPL L TO P-CCNC: 870 U/L (ref 84–246)
LEFT VENTRICLE INTERNAL DIMENSION DIASTOLE: 6.9 CM (ref 3.5–6)
LEFT VENTRICULAR OUTFLOW TRACT DIAMETER: 2.3 CM
LYMPHOCYTES # BLD AUTO: 0.58 X10*3/UL (ref 0.8–3)
LYMPHOCYTES # BLD AUTO: 0.72 X10*3/UL (ref 0.8–3)
LYMPHOCYTES # BLD AUTO: 0.96 X10*3/UL (ref 0.8–3)
LYMPHOCYTES NFR BLD AUTO: 2.3 %
LYMPHOCYTES NFR BLD AUTO: 3.3 %
LYMPHOCYTES NFR BLD AUTO: 4.8 %
MAGNESIUM SERPL-MCNC: 2.58 MG/DL (ref 1.6–2.4)
MAGNESIUM SERPL-MCNC: 2.77 MG/DL (ref 1.6–2.4)
MAGNESIUM SERPL-MCNC: 2.86 MG/DL (ref 1.6–2.4)
MCH RBC QN AUTO: 34.4 PG (ref 26–34)
MCH RBC QN AUTO: 34.5 PG (ref 26–34)
MCH RBC QN AUTO: 35.1 PG (ref 26–34)
MCHC RBC AUTO-ENTMCNC: 33.6 G/DL (ref 32–36)
MCHC RBC AUTO-ENTMCNC: 34 G/DL (ref 32–36)
MCHC RBC AUTO-ENTMCNC: 36.7 G/DL (ref 32–36)
MCV RBC AUTO: 101 FL (ref 80–100)
MCV RBC AUTO: 103 FL (ref 80–100)
MCV RBC AUTO: 96 FL (ref 80–100)
MONOCYTES # BLD AUTO: 0.7 X10*3/UL (ref 0.05–0.8)
MONOCYTES # BLD AUTO: 0.99 X10*3/UL (ref 0.05–0.8)
MONOCYTES # BLD AUTO: 1.02 X10*3/UL (ref 0.05–0.8)
MONOCYTES NFR BLD AUTO: 3.5 %
MONOCYTES NFR BLD AUTO: 4 %
MONOCYTES NFR BLD AUTO: 4.5 %
NEUTROPHILS # BLD AUTO: 17.73 X10*3/UL (ref 1.6–5.5)
NEUTROPHILS # BLD AUTO: 19.69 X10*3/UL (ref 1.6–5.5)
NEUTROPHILS # BLD AUTO: 23.41 X10*3/UL (ref 1.6–5.5)
NEUTROPHILS NFR BLD AUTO: 88.5 %
NEUTROPHILS NFR BLD AUTO: 90.6 %
NEUTROPHILS NFR BLD AUTO: 91.4 %
NRBC BLD-RTO: 0.3 /100 WBCS (ref 0–0)
NRBC BLD-RTO: 0.6 /100 WBCS (ref 0–0)
NRBC BLD-RTO: 0.7 /100 WBCS (ref 0–0)
OXYHGB MFR BLDA: 94.3 % (ref 94–98)
OXYHGB MFR BLDA: 95.5 % (ref 94–98)
OXYHGB MFR BLDA: 95.8 % (ref 94–98)
OXYHGB MFR BLDA: 96.5 % (ref 94–98)
OXYHGB MFR BLDMV: 49.2 % (ref 45–75)
OXYHGB MFR BLDMV: 52.9 % (ref 45–75)
OXYHGB MFR BLDMV: 53.8 % (ref 45–75)
OXYHGB MFR BLDMV: 55.1 % (ref 45–75)
OXYHGB MFR BLDMV: 56 % (ref 45–75)
PCO2 BLDA: 31 MM HG (ref 38–42)
PCO2 BLDA: 38 MM HG (ref 38–42)
PCO2 BLDA: 45 MM HG (ref 38–42)
PCO2 BLDA: 74 MM HG (ref 38–42)
PCO2 BLDMV: 40 MM HG (ref 41–51)
PCO2 BLDMV: 41 MM HG (ref 41–51)
PCO2 BLDMV: 47 MM HG (ref 41–51)
PCO2 BLDMV: 56 MM HG (ref 41–51)
PCO2 BLDMV: 67 MM HG (ref 41–51)
PH BLDA: 7.23 PH (ref 7.38–7.42)
PH BLDA: 7.35 PH (ref 7.38–7.42)
PH BLDA: 7.47 PH (ref 7.38–7.42)
PH BLDA: 7.54 PH (ref 7.38–7.42)
PH BLDMV: 7.22 PH (ref 7.33–7.43)
PH BLDMV: 7.37 PH (ref 7.33–7.43)
PH BLDMV: 7.42 PH (ref 7.33–7.43)
PH BLDMV: 7.44 PH (ref 7.33–7.43)
PH BLDMV: 7.44 PH (ref 7.33–7.43)
PHOSPHATE SERPL-MCNC: 6 MG/DL (ref 2.5–4.9)
PLATELET # BLD AUTO: 113 X10*3/UL (ref 150–450)
PLATELET # BLD AUTO: 123 X10*3/UL (ref 150–450)
PLATELET # BLD AUTO: 97 X10*3/UL (ref 150–450)
PO2 BLDA: 108 MM HG (ref 85–95)
PO2 BLDA: 110 MM HG (ref 85–95)
PO2 BLDA: 206 MM HG (ref 85–95)
PO2 BLDA: 91 MM HG (ref 85–95)
PO2 BLDMV: 35 MM HG (ref 35–45)
PO2 BLDMV: 36 MM HG (ref 35–45)
PO2 BLDMV: 38 MM HG (ref 35–45)
PO2 BLDMV: 39 MM HG (ref 35–45)
PO2 BLDMV: 40 MM HG (ref 35–45)
POTASSIUM BLDA-SCNC: 4.2 MMOL/L (ref 3.5–5.3)
POTASSIUM BLDA-SCNC: 4.4 MMOL/L (ref 3.5–5.3)
POTASSIUM BLDA-SCNC: 4.5 MMOL/L (ref 3.5–5.3)
POTASSIUM BLDMV-SCNC: 4.1 MMOL/L (ref 3.5–5.3)
POTASSIUM BLDMV-SCNC: 4.3 MMOL/L (ref 3.5–5.3)
POTASSIUM BLDMV-SCNC: 4.3 MMOL/L (ref 3.5–5.3)
POTASSIUM BLDMV-SCNC: 4.6 MMOL/L (ref 3.5–5.3)
POTASSIUM BLDMV-SCNC: 4.7 MMOL/L (ref 3.5–5.3)
POTASSIUM SERPL-SCNC: 4.1 MMOL/L (ref 3.5–5.3)
POTASSIUM SERPL-SCNC: 4.3 MMOL/L (ref 3.5–5.3)
POTASSIUM SERPL-SCNC: 4.9 MMOL/L (ref 3.5–5.3)
PRODUCT BLOOD TYPE: 1700
PRODUCT CODE: NORMAL
PROT SERPL-MCNC: 4.1 G/DL (ref 6.4–8.2)
PROT SERPL-MCNC: 4.6 G/DL (ref 6.4–8.2)
PROT SERPL-MCNC: 4.8 G/DL (ref 6.4–8.2)
PROTHROMBIN TIME: 13.3 SECONDS (ref 9.8–12.8)
RBC # BLD AUTO: 2.25 X10*6/UL (ref 4.5–5.9)
RBC # BLD AUTO: 2.32 X10*6/UL (ref 4.5–5.9)
RBC # BLD AUTO: 2.44 X10*6/UL (ref 4.5–5.9)
RETICS #: 0.11 X10*6/UL (ref 0.02–0.11)
RETICS/RBC NFR AUTO: 4.4 % (ref 0.5–2)
RSV RNA RESP QL NAA+PROBE: NOT DETECTED
SAO2 % BLDA: 100 % (ref 94–100)
SAO2 % BLDA: 98 % (ref 94–100)
SAO2 % BLDA: 99 % (ref 94–100)
SAO2 % BLDA: 99 % (ref 94–100)
SAO2 % BLDMV: 51 % (ref 45–75)
SAO2 % BLDMV: 54 % (ref 45–75)
SAO2 % BLDMV: 56 % (ref 45–75)
SAO2 % BLDMV: 57 % (ref 45–75)
SAO2 % BLDMV: 57 % (ref 45–75)
SARS-COV-2 RNA RESP QL NAA+PROBE: NOT DETECTED
SODIUM BLDA-SCNC: 135 MMOL/L (ref 136–145)
SODIUM BLDA-SCNC: 135 MMOL/L (ref 136–145)
SODIUM BLDA-SCNC: 137 MMOL/L (ref 136–145)
SODIUM BLDMV-SCNC: 134 MMOL/L (ref 136–145)
SODIUM BLDMV-SCNC: 134 MMOL/L (ref 136–145)
SODIUM BLDMV-SCNC: 135 MMOL/L (ref 136–145)
SODIUM BLDMV-SCNC: 136 MMOL/L (ref 136–145)
SODIUM BLDMV-SCNC: 136 MMOL/L (ref 136–145)
SODIUM SERPL-SCNC: 138 MMOL/L (ref 136–145)
SODIUM SERPL-SCNC: 139 MMOL/L (ref 136–145)
SODIUM SERPL-SCNC: 142 MMOL/L (ref 136–145)
UFH PPP CHRO-ACNC: 0.5 IU/ML
UNIT ABO: NORMAL
UNIT NUMBER: NORMAL
UNIT RH: NORMAL
UNIT VOLUME: 296
VANCOMYCIN TROUGH SERPL-MCNC: 21.8 UG/ML (ref 5–20)
VANCOMYCIN TROUGH SERPL-MCNC: 28.5 UG/ML (ref 5–20)
VIT B12 SERPL-MCNC: >2000 PG/ML (ref 211–911)
WBC # BLD AUTO: 20.1 X10*3/UL (ref 4.4–11.3)
WBC # BLD AUTO: 21.8 X10*3/UL (ref 4.4–11.3)
WBC # BLD AUTO: 25.6 X10*3/UL (ref 4.4–11.3)
XM INTEP: NORMAL

## 2024-02-24 PROCEDURE — 85025 COMPLETE CBC W/AUTO DIFF WBC: CPT | Performed by: STUDENT IN AN ORGANIZED HEALTH CARE EDUCATION/TRAINING PROGRAM

## 2024-02-24 PROCEDURE — 71045 X-RAY EXAM CHEST 1 VIEW: CPT

## 2024-02-24 PROCEDURE — 2500000004 HC RX 250 GENERAL PHARMACY W/ HCPCS (ALT 636 FOR OP/ED)

## 2024-02-24 PROCEDURE — 2020000001 HC ICU ROOM DAILY

## 2024-02-24 PROCEDURE — 87632 RESP VIRUS 6-11 TARGETS: CPT | Performed by: STUDENT IN AN ORGANIZED HEALTH CARE EDUCATION/TRAINING PROGRAM

## 2024-02-24 PROCEDURE — 2500000004 HC RX 250 GENERAL PHARMACY W/ HCPCS (ALT 636 FOR OP/ED): Performed by: STUDENT IN AN ORGANIZED HEALTH CARE EDUCATION/TRAINING PROGRAM

## 2024-02-24 PROCEDURE — 99232 SBSQ HOSP IP/OBS MODERATE 35: CPT

## 2024-02-24 PROCEDURE — 93308 TTE F-UP OR LMTD: CPT

## 2024-02-24 PROCEDURE — 37799 UNLISTED PX VASCULAR SURGERY: CPT

## 2024-02-24 PROCEDURE — 84132 ASSAY OF SERUM POTASSIUM: CPT

## 2024-02-24 PROCEDURE — 84132 ASSAY OF SERUM POTASSIUM: CPT | Performed by: STUDENT IN AN ORGANIZED HEALTH CARE EDUCATION/TRAINING PROGRAM

## 2024-02-24 PROCEDURE — 83615 LACTATE (LD) (LDH) ENZYME: CPT | Performed by: STUDENT IN AN ORGANIZED HEALTH CARE EDUCATION/TRAINING PROGRAM

## 2024-02-24 PROCEDURE — 2500000001 HC RX 250 WO HCPCS SELF ADMINISTERED DRUGS (ALT 637 FOR MEDICARE OP): Performed by: STUDENT IN AN ORGANIZED HEALTH CARE EDUCATION/TRAINING PROGRAM

## 2024-02-24 PROCEDURE — 85384 FIBRINOGEN ACTIVITY: CPT | Performed by: STUDENT IN AN ORGANIZED HEALTH CARE EDUCATION/TRAINING PROGRAM

## 2024-02-24 PROCEDURE — 83010 ASSAY OF HAPTOGLOBIN QUANT: CPT | Mod: WESLAB | Performed by: STUDENT IN AN ORGANIZED HEALTH CARE EDUCATION/TRAINING PROGRAM

## 2024-02-24 PROCEDURE — 82805 BLOOD GASES W/O2 SATURATION: CPT | Mod: MUE

## 2024-02-24 PROCEDURE — 85610 PROTHROMBIN TIME: CPT | Performed by: STUDENT IN AN ORGANIZED HEALTH CARE EDUCATION/TRAINING PROGRAM

## 2024-02-24 PROCEDURE — 82248 BILIRUBIN DIRECT: CPT | Performed by: STUDENT IN AN ORGANIZED HEALTH CARE EDUCATION/TRAINING PROGRAM

## 2024-02-24 PROCEDURE — 80202 ASSAY OF VANCOMYCIN: CPT

## 2024-02-24 PROCEDURE — 2500000002 HC RX 250 W HCPCS SELF ADMINISTERED DRUGS (ALT 637 FOR MEDICARE OP, ALT 636 FOR OP/ED): Performed by: STUDENT IN AN ORGANIZED HEALTH CARE EDUCATION/TRAINING PROGRAM

## 2024-02-24 PROCEDURE — 2500000005 HC RX 250 GENERAL PHARMACY W/O HCPCS

## 2024-02-24 PROCEDURE — 94002 VENT MGMT INPAT INIT DAY: CPT

## 2024-02-24 PROCEDURE — 71045 X-RAY EXAM CHEST 1 VIEW: CPT | Performed by: RADIOLOGY

## 2024-02-24 PROCEDURE — 87070 CULTURE OTHR SPECIMN AEROBIC: CPT | Performed by: STUDENT IN AN ORGANIZED HEALTH CARE EDUCATION/TRAINING PROGRAM

## 2024-02-24 PROCEDURE — 71045 X-RAY EXAM CHEST 1 VIEW: CPT | Mod: MUE

## 2024-02-24 PROCEDURE — 84484 ASSAY OF TROPONIN QUANT: CPT | Performed by: STUDENT IN AN ORGANIZED HEALTH CARE EDUCATION/TRAINING PROGRAM

## 2024-02-24 PROCEDURE — 85045 AUTOMATED RETICULOCYTE COUNT: CPT | Performed by: STUDENT IN AN ORGANIZED HEALTH CARE EDUCATION/TRAINING PROGRAM

## 2024-02-24 PROCEDURE — 93308 TTE F-UP OR LMTD: CPT | Performed by: INTERNAL MEDICINE

## 2024-02-24 PROCEDURE — 87637 SARSCOV2&INF A&B&RSV AMP PRB: CPT | Performed by: STUDENT IN AN ORGANIZED HEALTH CARE EDUCATION/TRAINING PROGRAM

## 2024-02-24 PROCEDURE — 85520 HEPARIN ASSAY: CPT

## 2024-02-24 PROCEDURE — 37799 UNLISTED PX VASCULAR SURGERY: CPT | Performed by: STUDENT IN AN ORGANIZED HEALTH CARE EDUCATION/TRAINING PROGRAM

## 2024-02-24 PROCEDURE — 84484 ASSAY OF TROPONIN QUANT: CPT

## 2024-02-24 PROCEDURE — 99291 CRITICAL CARE FIRST HOUR: CPT | Performed by: STUDENT IN AN ORGANIZED HEALTH CARE EDUCATION/TRAINING PROGRAM

## 2024-02-24 PROCEDURE — 5A12012 PERFORMANCE OF CARDIAC OUTPUT, SINGLE, MANUAL: ICD-10-PCS | Performed by: INTERNAL MEDICINE

## 2024-02-24 PROCEDURE — 83735 ASSAY OF MAGNESIUM: CPT | Performed by: STUDENT IN AN ORGANIZED HEALTH CARE EDUCATION/TRAINING PROGRAM

## 2024-02-24 PROCEDURE — 82533 TOTAL CORTISOL: CPT | Performed by: STUDENT IN AN ORGANIZED HEALTH CARE EDUCATION/TRAINING PROGRAM

## 2024-02-24 PROCEDURE — 82607 VITAMIN B-12: CPT | Performed by: STUDENT IN AN ORGANIZED HEALTH CARE EDUCATION/TRAINING PROGRAM

## 2024-02-24 PROCEDURE — 80069 RENAL FUNCTION PANEL: CPT | Performed by: STUDENT IN AN ORGANIZED HEALTH CARE EDUCATION/TRAINING PROGRAM

## 2024-02-24 RX ORDER — FENTANYL CITRATE-0.9 % NACL/PF 10 MCG/ML
PLASTIC BAG, INJECTION (ML) INTRAVENOUS
Status: COMPLETED
Start: 2024-02-24 | End: 2024-02-24

## 2024-02-24 RX ORDER — PANTOPRAZOLE SODIUM 40 MG/10ML
40 INJECTION, POWDER, LYOPHILIZED, FOR SOLUTION INTRAVENOUS DAILY
Status: DISCONTINUED | OUTPATIENT
Start: 2024-02-24 | End: 2024-02-24

## 2024-02-24 RX ORDER — EPINEPHRINE 1 MG/ML
INJECTION INTRAMUSCULAR; INTRAVENOUS; SUBCUTANEOUS
Status: COMPLETED
Start: 2024-02-24 | End: 2024-02-24

## 2024-02-24 RX ORDER — NOREPINEPHRINE BITARTRATE 1 MG/ML
INJECTION, SOLUTION INTRAVENOUS
Status: DISPENSED
Start: 2024-02-24 | End: 2024-02-25

## 2024-02-24 RX ORDER — PANTOPRAZOLE SODIUM 40 MG/10ML
40 INJECTION, POWDER, LYOPHILIZED, FOR SOLUTION INTRAVENOUS DAILY
Status: DISCONTINUED | OUTPATIENT
Start: 2024-02-25 | End: 2024-02-26 | Stop reason: HOSPADM

## 2024-02-24 RX ORDER — INSULIN LISPRO 100 [IU]/ML
0-5 INJECTION, SOLUTION INTRAVENOUS; SUBCUTANEOUS
Status: DISCONTINUED | OUTPATIENT
Start: 2024-02-25 | End: 2024-02-26 | Stop reason: HOSPADM

## 2024-02-24 RX ORDER — POLYETHYLENE GLYCOL 3350 17 G/17G
17 POWDER, FOR SOLUTION ORAL DAILY
Status: DISCONTINUED | OUTPATIENT
Start: 2024-02-24 | End: 2024-02-26 | Stop reason: HOSPADM

## 2024-02-24 RX ORDER — MIDAZOLAM HYDROCHLORIDE 1 MG/ML
0-20 INJECTION, SOLUTION INTRAVENOUS CONTINUOUS
Status: DISCONTINUED | OUTPATIENT
Start: 2024-02-24 | End: 2024-02-26 | Stop reason: HOSPADM

## 2024-02-24 RX ORDER — VANCOMYCIN HYDROCHLORIDE 1 G/20ML
INJECTION, POWDER, LYOPHILIZED, FOR SOLUTION INTRAVENOUS DAILY PRN
Status: DISCONTINUED | OUTPATIENT
Start: 2024-02-24 | End: 2024-02-25

## 2024-02-24 RX ORDER — SODIUM BICARBONATE 1 MEQ/ML
SYRINGE (ML) INTRAVENOUS
Status: COMPLETED
Start: 2024-02-24 | End: 2024-02-24

## 2024-02-24 RX ORDER — CALCIUM GLUCONATE 20 MG/ML
2 INJECTION, SOLUTION INTRAVENOUS ONCE
Status: COMPLETED | OUTPATIENT
Start: 2024-02-24 | End: 2024-02-24

## 2024-02-24 RX ORDER — FENTANYL CITRATE-0.9 % NACL/PF 10 MCG/ML
0-300 PLASTIC BAG, INJECTION (ML) INTRAVENOUS CONTINUOUS
Status: DISCONTINUED | OUTPATIENT
Start: 2024-02-24 | End: 2024-02-26 | Stop reason: HOSPADM

## 2024-02-24 RX ADMIN — INSULIN LISPRO 3 UNITS: 100 INJECTION, SOLUTION INTRAVENOUS; SUBCUTANEOUS at 21:54

## 2024-02-24 RX ADMIN — Medication 50 MCG/HR: at 14:34

## 2024-02-24 RX ADMIN — HEPARIN SODIUM AND DEXTROSE 1600 UNITS/HR: 10000; 5 INJECTION INTRAVENOUS at 12:52

## 2024-02-24 RX ADMIN — AZITHROMYCIN 500 MG: 500 INJECTION, POWDER, LYOPHILIZED, FOR SOLUTION INTRAVENOUS at 12:52

## 2024-02-24 RX ADMIN — VANCOMYCIN HYDROCHLORIDE 1750 MG: 1 INJECTION, POWDER, LYOPHILIZED, FOR SOLUTION INTRAVENOUS at 09:51

## 2024-02-24 RX ADMIN — EPINEPHRINE: 1 INJECTION INTRAMUSCULAR; INTRAVENOUS; SUBCUTANEOUS at 13:45

## 2024-02-24 RX ADMIN — PIPERACILLIN SODIUM AND TAZOBACTAM SODIUM 2.25 G: 2; .25 INJECTION, SOLUTION INTRAVENOUS at 09:51

## 2024-02-24 RX ADMIN — VASOPRESSIN 0.06 UNITS/MIN: 0.2 INJECTION INTRAVENOUS at 14:40

## 2024-02-24 RX ADMIN — Medication 75 MCG/HR: at 21:47

## 2024-02-24 RX ADMIN — DEXTROSE MONOHYDRATE 1.4 MCG/KG/MIN: 50 INJECTION, SOLUTION INTRAVENOUS at 23:05

## 2024-02-24 RX ADMIN — PANTOPRAZOLE SODIUM 40 MG: 40 TABLET, DELAYED RELEASE ORAL at 10:08

## 2024-02-24 RX ADMIN — HYDROCORTISONE SODIUM SUCCINATE 50 MG: 100 INJECTION, POWDER, FOR SOLUTION INTRAMUSCULAR; INTRAVENOUS at 21:48

## 2024-02-24 RX ADMIN — MIDAZOLAM IN SODIUM CHLORIDE 2 MG/HR: 1 INJECTION INTRAVENOUS at 21:47

## 2024-02-24 RX ADMIN — DEXTROSE MONOHYDRATE 1.3 MCG/KG/MIN: 50 INJECTION, SOLUTION INTRAVENOUS at 19:11

## 2024-02-24 RX ADMIN — VASOPRESSIN 0.06 UNITS/MIN: 0.2 INJECTION INTRAVENOUS at 18:30

## 2024-02-24 RX ADMIN — DEXTROSE MONOHYDRATE 1.4 MCG/KG/MIN: 50 INJECTION, SOLUTION INTRAVENOUS at 20:43

## 2024-02-24 RX ADMIN — VASOPRESSIN 0.06 UNITS/MIN: 0.2 INJECTION INTRAVENOUS at 23:52

## 2024-02-24 RX ADMIN — CLOPIDOGREL BISULFATE 75 MG: 75 TABLET ORAL at 10:09

## 2024-02-24 RX ADMIN — MEROPENEM 1 G: 1 INJECTION, POWDER, FOR SOLUTION INTRAVENOUS at 16:30

## 2024-02-24 RX ADMIN — PIPERACILLIN SODIUM AND TAZOBACTAM SODIUM 2.25 G: 2; .25 INJECTION, SOLUTION INTRAVENOUS at 03:00

## 2024-02-24 RX ADMIN — CALCIUM GLUCONATE 2 G: 20 INJECTION, SOLUTION INTRAVENOUS at 15:45

## 2024-02-24 RX ADMIN — PERFLUTREN 1 ML OF DILUTION: 6.52 INJECTION, SUSPENSION INTRAVENOUS at 15:06

## 2024-02-24 RX ADMIN — ASPIRIN 81 MG: 81 TABLET, COATED ORAL at 10:09

## 2024-02-24 RX ADMIN — DOBUTAMINE HYDROCHLORIDE 5 MCG/KG/MIN: 400 INJECTION INTRAVENOUS at 21:47

## 2024-02-24 RX ADMIN — SODIUM BICARBONATE: 84 INJECTION INTRAVENOUS at 13:45

## 2024-02-24 RX ADMIN — HYDROCORTISONE SODIUM SUCCINATE 50 MG: 100 INJECTION, POWDER, FOR SOLUTION INTRAMUSCULAR; INTRAVENOUS at 16:30

## 2024-02-24 ASSESSMENT — PAIN - FUNCTIONAL ASSESSMENT
PAIN_FUNCTIONAL_ASSESSMENT: 0-10
PAIN_FUNCTIONAL_ASSESSMENT: 0-10
PAIN_FUNCTIONAL_ASSESSMENT: CPOT (CRITICAL CARE PAIN OBSERVATION TOOL)
PAIN_FUNCTIONAL_ASSESSMENT: 0-10
PAIN_FUNCTIONAL_ASSESSMENT: CPOT (CRITICAL CARE PAIN OBSERVATION TOOL)
PAIN_FUNCTIONAL_ASSESSMENT: CPOT (CRITICAL CARE PAIN OBSERVATION TOOL)

## 2024-02-24 ASSESSMENT — PAIN SCALES - GENERAL
PAINLEVEL_OUTOF10: 0 - NO PAIN

## 2024-02-24 NOTE — PROGRESS NOTES
Nephrology Progress Note   [facility]  February 24, 2024    Patient: Tesfaye Milton    Medical Record: [id number]    Subjective   Yesterday patient was not deemed not a candidate for LVAD or transplant.BAV not indicated at this time. S/p 1u pRBC o/n in prep for possible BAV. MAPs in 50s-60s overnight. On Levo 0.04 and dobutamine 2.5     Patient seen at bedside this morning.  More sleepy today on exam, intermittently opening eyes. Denies any pain or new symptoms this morning.    Medications   Medications:   Scheduled medications  aspirin, 81 mg, oral, Daily  clopidogrel, 75 mg, oral, Daily  pantoprazole, 40 mg, oral, Daily before breakfast  perflutren lipid microspheres, 0.5-10 mL of dilution, intravenous, Once in imaging  perflutren protein A microsphere, 0.5 mL, intravenous, Once in imaging  perflutren protein A microsphere, 0.5 mL, intravenous, Once in imaging  piperacillin-tazobactam, 2.25 g, intravenous, q6h  polyethylene glycol, 17 g, oral, Daily  sennosides-docusate sodium, 2 tablet, oral, BID  sulfur hexafluoride microsphr, 2 mL, intravenous, Once in imaging  sulfur hexafluoride microsphr, 2 mL, intravenous, Once in imaging  vancomycin, 1,750 mg, intravenous, Once      Continuous medications  DOBUTamine, 2.5-20 mcg/kg/min, Last Rate: 2.5 mcg/kg/min (02/24/24 0600)  heparin, 0-4,500 Units/hr, Last Rate: 1,600 Units/hr (02/24/24 0600)  norepinephrine, 0.01-1 mcg/kg/min, Last Rate: 0.05 mcg/kg/min (02/24/24 0755)      PRN medications  PRN medications: acetaminophen, dextrose 10 % in water (D10W), dextrose, glucagon, heparin, melatonin, oxyCODONE, oxygen, vancomycin     Objective   Vitals  Visit Vitals  BP (!) 111/39   Pulse (!) 120   Temp 35.2 °C (95.4 °F)   Resp 15        Intake/Output  I/O last 3 completed shifts:  In: 1699.6 (15.5 mL/kg) [I.V.:803.6 (7.3 mL/kg); Blood:296; IV Piggyback:600]  Out: 1680 (15.3 mL/kg) [Urine:1680 (0.4 mL/kg/hr)]  Weight: 110 kg     Physical  Exam  Constitutional:       Appearance: Laying in bed, arouses to verbal stimulation.   Eyes:      Extraocular Movements: Extraocular movements intact.      Conjunctiva/sclera: Conjunctivae normal.   Neck:      Comments: [place] in left IJ  Cardiovascular:      Rate and Rhythm: Normal rate and regular rhythm.   Pulmonary:      Effort: Pulmonary effort is normal.      Breath sounds: Some coarse breath sounds noted bilaterally.   Abdominal:      General: Abdomen is flat. There is no distension.      Palpations: Abdomen is soft.      Tenderness: There is no abdominal tenderness.   Musculoskeletal:      Right lower leg: No edema.      Left lower leg: No edema.   Neurological:      Mental Status: He is alert and oriented to person, place. Appears more sleepy on exam.     Labs  Results from last 7 days   Lab Units 02/24/24 0416 02/23/24 1805 02/23/24  0519   WBC AUTO x10*3/uL 21.8* 18.9* 18.7*   HEMOGLOBIN g/dL 8.4* 8.7* 8.4*   HEMATOCRIT % 25.0* 26.0* 23.2*   PLATELETS AUTO x10*3/uL 123* 115* 126*       Results from last 7 days   Lab Units 02/24/24  0416 02/23/24  1805 02/23/24  0519 02/23/24  0004 02/22/24  1743   SODIUM mmol/L 139 138 140   < > 138   POTASSIUM mmol/L 4.1 4.2 4.2   < > 4.0   CO2 mmol/L 28 28 29   < > 31   ANION GAP mmol/L 17 16 17   < > 13   BUN mg/dL 92* 83* 74*   < > 71*   CREATININE mg/dL 2.79* 2.31* 2.31*   < > 2.19*   GLUCOSE mg/dL 139* 150* 143*   < > 122*   EGFR mL/min/1.73m*2 23* 29* 29*   < > 30*   MAGNESIUM mg/dL 2.58* 2.38 2.30   < > 2.23   PHOSPHORUS mg/dL 6.0* 5.3*  --   --  3.9    < > = values in this interval not displayed.        Results from last 7 days   Lab Units 02/24/24  0416 02/23/24  0519 02/23/24  0004   ALT U/L 115* 154* 167*   AST U/L 56* 62* 65*   ALK PHOS U/L 64 59 61        Results from last 7 days   Lab Units 02/19/24  2226 02/19/24  1202   INR  1.2* 1.0       Results from last 7 days   Lab Units 02/24/24  0740 02/24/24  0604 02/23/24  2340 02/23/24  0144 02/22/24  2312  02/21/24  1152 02/21/24  1009   POCT PH, ARTERIAL pH 7.47*  --   --   --  7.48*  --  7.48*   POCT PO2, ARTERIAL mm Hg 91  --   --   --  62*  --  120*   POCT PCO2, ARTERIAL mm Hg 38  --   --   --  43*  --  34*   FIO2 % 40 40 40   < > 36   < > 32    < > = values in this interval not displayed.       Results from last 7 days   Lab Units 02/19/24  1912   POCT PH, VENOUS pH 7.37   POCT PCO2, VENOUS mm Hg 46       Results from last 7 days   Lab Units 02/19/24  1202   LACTATE mmol/L 1.8         Imaging  === 02/19/24 ===    XR CHEST 1 VIEW    - Impression -  1.  Interval placement of left Trialysis line with tip overlying the  mid SVC.  2. No change or interval improvement in interstitial pulmonary edema  and patchy bilateral infiltrates.  3.  Additional medical devices as described above.    I personally reviewed the images/study and I agree with Ria Asencio DO's (radiology resident) findings as stated. This study  was interpreted at [place], [place].    MACRO:  None    Signed by: Piotr Cramer 2/20/2024 10:02 AM  Dictation workstation:   [id number]   === 10/28/21 ===    CT CHEST SCREENING FOR LUNG CANCER    - Impression -  1. Small stable nodularities bilaterally. These may be related to scarring.  No new or enlarging nodules.  2. Severe atherosclerotic coronary calcifications and cardiomegaly  3. Mildly dilated main pulmonary artery similar to the prior exam can be  seen with pulmonary arterial hypertension..    RECOMMENDATIONS: Continued annual low dose CTis recommended.  Category: Lung-RADS Category 2 --  Nodules with a very low likelihood of  becoming a clinically active cancer due to size or lack of growth.  Continue  annual screening with LDCT in 12 months.      Recommendations above based on NCCN guidelines Version 1.2021 for lung  cancer screening.    NOTE:  Nodule size measurements are mean diameters, the average of the  longest diameter of the nodule and  its perpendicular diameter.      [id number]    This report has been produced using speech recognition.  This exam is available in DICOM format to non-affiliated healthcare  facilities on a secure media free searchable basis with prior patient  authorization.  The patient exposure is reported to a radiation dose index  registry.  All CT examinations are performed with one or more of the  following dose reduction techniques: Automated Exposure Control, Adjustment  of mA and/or KV according to patient size, or use of iterative  reconstruction techniques.    Original Interpreting Physician:   MYLES GARCIA MD  Original Transcribed by/Date: PSCB   Oct 28 2021  2:50P  Original Electronically Signed by/Date: MYLES GARCIA MD Oct 28 2021  2:50P    Addendum Interpreting Physician:  Addendum Transcribed by/Date: NO ADDENDUM  Addendum Electronically Signed by/Date:     Assessment/Plan   Tesfaye Milton is a 78 y.o. male with PMH of CAD s/p multiple stents with known PCI to LAD and LCx (2011), Afib failed multiple DCCV, ablation and antiarrythmic therapies on Xarelto and rate control strategy, HFrEF (20-25% LVEF), mod AS, HTN, prostate cancer s/p brachytherapy (2022) who presents as a transfer from [facility] in cardiogenic shock. Nephrology consulted for anuria and JEFF. Was not making urine since 2/19 6 pm (the time he arrived at ). Recommended lopez catheter placement, BMP BID and close monitoring for CRRT with potential to start within 24h. Etiology of worsening kidney function is multifactorial; CRS, cardiogenic shock, SHELLIE, Zosyn usage. Patient currently with hemodynamic monitoring with RHC and on norepinephrine, vaso stopped 2/20 AM. No signs of significant volume overload on exam. Currently on 3L NC. Recommend to reduce intake of fluids as much as possible, prefer using isotonic solutions if giving fluids, No urgent indications for RRT at this time, but will continue close monitoring of hemodynamics and worsening O2  requirement which may necessitate earlier RRT. Had brief initation of CVVH night of 2/20 for c/f volume overlaod, however started making urine shortly after. CVVH stopped. Patient with improved UOP 2/21 of 1646cc. Having good UOP with diuresis. Had drop in MAPs 60-70s 2/22. 2/23 patient was not deemed not a candidate for LVAD or transplant. BAV not indicated at this time.      #JEFF Stage III, non-oliguric likely 2/2 ischemic tubular injury in the setting of cardiogenic shock   - Baseline creatinine: 0.9-1.0  - Electrolytes (Na, K, Ca, Phos): stable  - had drop in MAPs overnight to 60s-70s on 2/22 and started on dobutamine, given 250cc bolus. Continues on Levo and dobutamine.   - Anticipate rise in Cr given drop in MAPs   - Creatinine trends: 2.9>4.3>2.5>1.6>1.0>2.51 >2.84>3.09>2.92>2.33>2.06>2.31>2.79   - Elevation in Cr likely 2/2 to lower perfusion due to decreased MAPs vs contrast associated kidney injury     -  2/23   - s/p PCI to LAD and LCX 2/22    - Per primary team, not a candidate for LVAD or transplant. Having lower MAPs, on levo 0.04 and dobutamine 2.5 2/24     Recommendations:  - Please maintain MAPs > 70  for renal perfusion   - No urgent indication for kidney replacement therapy.   - Please limit infusion of fluids as much as possible, recommend isotonic solutions if giving fluids   - BMP BID, continuing to monitor   - Avoid nephrotoxins, contrast if possible  - strict Is/Os  - Renal dosing for medications for latest eGFR, follow medication trough as appropriate     Thank you for this consult. Nephrology will continue to follow this patient. Discussed with attending nephrologist,  Dr. Del Angel.      Belem Pat MD  24 hour Renal Pager - [phone]

## 2024-02-24 NOTE — SIGNIFICANT EVENT
Patient's MAPs began to drop suddenly into the 40s. Became unresponsive and pulseless.     Patient went into PEA arrest at 1:26. Underwent 16 total minutes of CPR. First two rounds given epi x2, bicarb. At third pulse check rhythm was VT - shock delivered, amio given. No pulse post shock, given epi, bicarb and CPR resumed for an additional 5 rounds of compressions. Given additional 3 doses of epi + 2 amps of bicarb. Patient intubated with bloody output from ET tube. ROSC obtained. Started on vaso. Family at bedside and informed.  Code status changed to DNR/DNI.

## 2024-02-24 NOTE — CARE PLAN
The patient's goals for the shift include      The clinical goals for the shift include Pt will remain hemodynamically stable throughout shift.      Problem: Skin  Goal: Decreased wound size/increased tissue granulation at next dressing change  Outcome: Progressing  Flowsheets (Taken 2/23/2024 0651)  Decreased wound size/increased tissue granulation at next dressing change: Protective dressings over bony prominences  Goal: Participates in plan/prevention/treatment measures  Outcome: Progressing  Flowsheets (Taken 2/23/2024 0651)  Participates in plan/prevention/treatment measures:   Discuss with provider PT/OT consult   Elevate heels  Goal: Prevent/manage excess moisture  Outcome: Progressing  Flowsheets (Taken 2/23/2024 0651)  Prevent/manage excess moisture:   Use wicking fabric (obtain order)   Moisturize dry skin  Goal: Prevent/minimize sheer/friction injuries  Outcome: Progressing  Flowsheets (Taken 2/23/2024 0651)  Prevent/minimize sheer/friction injuries: Use pull sheet  Goal: Promote/optimize nutrition  Outcome: Progressing  Flowsheets (Taken 2/23/2024 0651)  Promote/optimize nutrition: Monitor/record intake including meals  Goal: Promote skin healing  Outcome: Progressing  Flowsheets (Taken 2/23/2024 0651)  Promote skin healing: Turn/reposition every 2 hours/use positioning/transfer devices     Problem: Safety - Medical Restraint  Goal: Remains free of injury from restraints (Restraint for Interference with Medical Device)  Outcome: Progressing  Flowsheets (Taken 2/24/2024 0631)  Remains free of injury from restraints (restraint for interference with medical device): Every 2 hours: Monitor safety, psychosocial status, comfort, nutrition and hydration

## 2024-02-24 NOTE — PROGRESS NOTES
Pharmacy to Dose Vancomycin:  Tesfaye Milton is a 78 y.o. male ordered pharmacy to dose vancomycin for pneumonia. Today is day 4 of therapy.  Goal: Trough 15-20mg/L  Results from last 7 days   Lab Units 02/24/24  0416 02/23/24  1805 02/23/24  0519 02/22/24  1743 02/22/24  0425   BUN mg/dL 92* 83* 74*   < > 80*   CREATININE mg/dL 2.79* 2.31* 2.31*   < > 2.33*   WBC AUTO x10*3/uL 21.8* 18.9* 18.7*   < > 14.5*   VANCOMYCIN RM ug/mL  --   --   --   --  15.6   VANCOMYCIN TR ug/mL 21.8*  --   --   --   --     < > = values in this interval not displayed.      Staph/MRSA Screen Culture   Date/Time Value Ref Range Status   02/21/2024 10:22 AM No Staphylococcus aureus isolated  Final     Urine Culture   Date/Time Value Ref Range Status   02/13/2024 03:59 PM No growth  Final     Blood Culture   Date/Time Value Ref Range Status   02/23/2024 12:44 PM Loaded on Instrument - Culture in progress  Preliminary      Renal function is declining. Level 21.8 this AM drawn 19hrs post 2G dose. Technically the patient meets JEFF criteria with increase in Scr this AM so will stop scheduled vanco and dose by level.    Plan:  Give vanco 1.75G today.  Follow-up level ordered for 2/25 AM unless clinically indicated sooner.  Pharmacy will continue daily vancomycin monitoring. Please contact the pharmacy at extension [phone] with any questions.    Thank you,  Barber Jones [facility]

## 2024-02-24 NOTE — CARE PLAN
Problem: Skin  Goal: Decreased wound size/increased tissue granulation at next dressing change  2/24/2024 0632 by Francesco Alonzo RN  Outcome: Progressing  Flowsheets (Taken 2/24/2024 0632)  Decreased wound size/increased tissue granulation at next dressing change: Protective dressings over bony prominences  2/24/2024 0631 by Francesco Alonzo RN  Outcome: Progressing  Flowsheets (Taken 2/23/2024 0651)  Decreased wound size/increased tissue granulation at next dressing change: Protective dressings over bony prominences  Goal: Participates in plan/prevention/treatment measures  2/24/2024 0632 by Francesco Alonzo RN  Outcome: Progressing  Flowsheets (Taken 2/24/2024 0632)  Participates in plan/prevention/treatment measures:   Discuss with provider PT/OT consult   Elevate heels  2/24/2024 0631 by Francesco Alonzo RN  Outcome: Progressing  Flowsheets (Taken 2/23/2024 0651)  Participates in plan/prevention/treatment measures:   Discuss with provider PT/OT consult   Elevate heels  Goal: Prevent/manage excess moisture  2/24/2024 0632 by Francesco Alonzo RN  Outcome: Progressing  Flowsheets (Taken 2/24/2024 0632)  Prevent/manage excess moisture:   Use wicking fabric (obtain order)   Moisturize dry skin  2/24/2024 0631 by Francesco Alonzo RN  Outcome: Progressing  Flowsheets (Taken 2/23/2024 0651)  Prevent/manage excess moisture:   Use wicking fabric (obtain order)   Moisturize dry skin  Goal: Prevent/minimize sheer/friction injuries  2/24/2024 0632 by Francesco Alonzo RN  Outcome: Progressing  Flowsheets (Taken 2/24/2024 0632)  Prevent/minimize sheer/friction injuries: Use pull sheet  2/24/2024 0631 by Francesco Alonzo RN  Outcome: Progressing  Flowsheets (Taken 2/23/2024 0651)  Prevent/minimize sheer/friction injuries: Use pull sheet  Goal: Promote/optimize nutrition  2/24/2024 0632 by Francesco Alonzo RN  Outcome: Progressing  Flowsheets (Taken 2/24/2024 0632)  Promote/optimize nutrition: Monitor/record intake including meals  2/24/2024  0631 by Francesco Alonzo RN  Outcome: Progressing  Flowsheets (Taken 2/23/2024 0651)  Promote/optimize nutrition: Monitor/record intake including meals  Goal: Promote skin healing  2/24/2024 0632 by Francesco Alonzo RN  Outcome: Progressing  Flowsheets (Taken 2/24/2024 0632)  Promote skin healing: Turn/reposition every 2 hours/use positioning/transfer devices  2/24/2024 0631 by Francesco Alonzo RN  Outcome: Progressing  Flowsheets (Taken 2/23/2024 0651)  Promote skin healing: Turn/reposition every 2 hours/use positioning/transfer devices     Problem: Safety - Medical Restraint  Goal: Remains free of injury from restraints (Restraint for Interference with Medical Device)  Outcome: Progressing  Flowsheets (Taken 2/24/2024 0631)  Remains free of injury from restraints (restraint for interference with medical device): Every 2 hours: Monitor safety, psychosocial status, comfort, nutrition and hydration   The patient's goals for the shift include      The clinical goals for the shift include Pt will remain hemodynamically stable throughout shift.

## 2024-02-25 ENCOUNTER — APPOINTMENT (OUTPATIENT)
Dept: RADIOLOGY | Facility: HOSPITAL | Age: 79
DRG: 270 | End: 2024-02-25
Payer: MEDICARE

## 2024-02-25 PROBLEM — I46.9 CARDIAC ARREST (MULTI): Status: ACTIVE | Noted: 2024-02-25

## 2024-02-25 PROBLEM — J18.9 PNEUMONIA: Status: ACTIVE | Noted: 2024-02-25

## 2024-02-25 PROBLEM — E87.70 HYPERVOLEMIA: Status: ACTIVE | Noted: 2024-02-25

## 2024-02-25 LAB
ALBUMIN SERPL BCP-MCNC: 3 G/DL (ref 3.4–5)
ANION GAP BLDA CALCULATED.4IONS-SCNC: 16 MMO/L (ref 10–25)
ANION GAP BLDA CALCULATED.4IONS-SCNC: 19 MMO/L (ref 10–25)
ANION GAP BLDMV CALCULATED.4IONS-SCNC: 14 MMO/L (ref 10–25)
ANION GAP BLDMV CALCULATED.4IONS-SCNC: 16 MMO/L (ref 10–25)
ANION GAP BLDMV CALCULATED.4IONS-SCNC: 18 MMO/L (ref 10–25)
ANION GAP SERPL CALC-SCNC: 19 MMOL/L (ref 10–20)
ANION GAP SERPL CALC-SCNC: 21 MMOL/L (ref 10–20)
ANION GAP SERPL CALC-SCNC: 22 MMOL/L (ref 10–20)
APTT PPP: 183 SECONDS (ref 27–38)
BASE EXCESS BLDA CALC-SCNC: -12.9 MMOL/L (ref -2–3)
BASE EXCESS BLDA CALC-SCNC: -5.8 MMOL/L (ref -2–3)
BASE EXCESS BLDMV CALC-SCNC: -1.4 MMOL/L (ref -2–3)
BASE EXCESS BLDMV CALC-SCNC: -7.2 MMOL/L (ref -2–3)
BASE EXCESS BLDMV CALC-SCNC: -9 MMOL/L (ref -2–3)
BASOPHILS # BLD AUTO: 0.02 X10*3/UL (ref 0–0.1)
BASOPHILS # BLD MANUAL: 0 X10*3/UL (ref 0–0.1)
BASOPHILS NFR BLD AUTO: 0.1 %
BASOPHILS NFR BLD MANUAL: 0 %
BODY TEMPERATURE: 37 DEGREES CELSIUS
BUN SERPL-MCNC: 100 MG/DL (ref 6–23)
BUN SERPL-MCNC: 100 MG/DL (ref 6–23)
BUN SERPL-MCNC: 65 MG/DL (ref 6–23)
BUN SERPL-MCNC: 77 MG/DL (ref 6–23)
BUN SERPL-MCNC: 89 MG/DL (ref 6–23)
BURR CELLS BLD QL SMEAR: ABNORMAL
CA-I BLD-SCNC: 1 MMOL/L (ref 1.1–1.33)
CA-I BLD-SCNC: 1.07 MMOL/L (ref 1.1–1.33)
CA-I BLD-SCNC: 1.08 MMOL/L (ref 1.1–1.33)
CA-I BLDA-SCNC: 0.94 MMOL/L (ref 1.1–1.33)
CA-I BLDA-SCNC: 1.05 MMOL/L (ref 1.1–1.33)
CA-I BLDMV-SCNC: 0.87 MMOL/L (ref 1.1–1.33)
CA-I BLDMV-SCNC: 1.02 MMOL/L (ref 1.1–1.33)
CA-I BLDMV-SCNC: 1.07 MMOL/L (ref 1.1–1.33)
CALCIUM SERPL-MCNC: 8.2 MG/DL (ref 8.6–10.6)
CALCIUM SERPL-MCNC: 8.5 MG/DL (ref 8.6–10.6)
CHLORIDE BLD-SCNC: 100 MMOL/L (ref 98–107)
CHLORIDE BLD-SCNC: 104 MMOL/L (ref 98–107)
CHLORIDE BLD-SCNC: 96 MMOL/L (ref 98–107)
CHLORIDE BLDA-SCNC: 100 MMOL/L (ref 98–107)
CHLORIDE BLDA-SCNC: 99 MMOL/L (ref 98–107)
CHLORIDE SERPL-SCNC: 93 MMOL/L (ref 98–107)
CHLORIDE SERPL-SCNC: 96 MMOL/L (ref 98–107)
CHLORIDE SERPL-SCNC: 97 MMOL/L (ref 98–107)
CO2 SERPL-SCNC: 22 MMOL/L (ref 21–32)
CO2 SERPL-SCNC: 22 MMOL/L (ref 21–32)
CO2 SERPL-SCNC: 24 MMOL/L (ref 21–32)
CO2 SERPL-SCNC: 25 MMOL/L (ref 21–32)
CO2 SERPL-SCNC: 25 MMOL/L (ref 21–32)
CREAT SERPL-MCNC: 3.14 MG/DL (ref 0.5–1.3)
CREAT SERPL-MCNC: 3.14 MG/DL (ref 0.5–1.3)
CREAT SERPL-MCNC: 3.37 MG/DL (ref 0.5–1.3)
CREAT SERPL-MCNC: 3.69 MG/DL (ref 0.5–1.3)
CREAT SERPL-MCNC: 3.69 MG/DL (ref 0.5–1.3)
EGFRCR SERPLBLD CKD-EPI 2021: 16 ML/MIN/1.73M*2
EGFRCR SERPLBLD CKD-EPI 2021: 16 ML/MIN/1.73M*2
EGFRCR SERPLBLD CKD-EPI 2021: 18 ML/MIN/1.73M*2
EGFRCR SERPLBLD CKD-EPI 2021: 20 ML/MIN/1.73M*2
EGFRCR SERPLBLD CKD-EPI 2021: 20 ML/MIN/1.73M*2
EOSINOPHIL # BLD AUTO: 0 X10*3/UL (ref 0–0.4)
EOSINOPHIL # BLD MANUAL: 0 X10*3/UL (ref 0–0.4)
EOSINOPHIL NFR BLD AUTO: 0 %
EOSINOPHIL NFR BLD MANUAL: 0 %
ERYTHROCYTE [DISTWIDTH] IN BLOOD BY AUTOMATED COUNT: 15.7 % (ref 11.5–14.5)
ERYTHROCYTE [DISTWIDTH] IN BLOOD BY AUTOMATED COUNT: 15.9 % (ref 11.5–14.5)
ERYTHROCYTE [DISTWIDTH] IN BLOOD BY AUTOMATED COUNT: 16.3 % (ref 11.5–14.5)
FIBRINOGEN PPP-MCNC: 627 MG/DL (ref 200–400)
GLUCOSE BLD MANUAL STRIP-MCNC: 142 MG/DL (ref 74–99)
GLUCOSE BLD MANUAL STRIP-MCNC: 187 MG/DL (ref 74–99)
GLUCOSE BLD MANUAL STRIP-MCNC: 192 MG/DL (ref 74–99)
GLUCOSE BLD-MCNC: 164 MG/DL (ref 74–99)
GLUCOSE BLD-MCNC: 170 MG/DL (ref 74–99)
GLUCOSE BLD-MCNC: 216 MG/DL (ref 74–99)
GLUCOSE BLDA-MCNC: 179 MG/DL (ref 74–99)
GLUCOSE BLDA-MCNC: 192 MG/DL (ref 74–99)
GLUCOSE SERPL-MCNC: 185 MG/DL (ref 74–99)
GLUCOSE SERPL-MCNC: 226 MG/DL (ref 74–99)
HAPTOGLOB SERPL-MCNC: <10 MG/DL (ref 37–246)
HAPTOGLOB SERPL-MCNC: <10 MG/DL (ref 37–246)
HCO3 BLDA-SCNC: 14.1 MMOL/L (ref 22–26)
HCO3 BLDA-SCNC: 19.4 MMOL/L (ref 22–26)
HCO3 BLDMV-SCNC: 17.6 MMOL/L (ref 22–26)
HCO3 BLDMV-SCNC: 19.3 MMOL/L (ref 22–26)
HCO3 BLDMV-SCNC: 25.1 MMOL/L (ref 22–26)
HCT VFR BLD AUTO: 21.5 % (ref 41–52)
HCT VFR BLD AUTO: 22.3 % (ref 41–52)
HCT VFR BLD AUTO: 23.1 % (ref 41–52)
HCT VFR BLD EST: 20 % (ref 41–52)
HCT VFR BLD EST: 22 % (ref 41–52)
HCT VFR BLD EST: 23 % (ref 41–52)
HCT VFR BLD EST: 24 % (ref 41–52)
HCT VFR BLD EST: 24 % (ref 41–52)
HGB BLD-MCNC: 7.6 G/DL (ref 13.5–17.5)
HGB BLD-MCNC: 7.7 G/DL (ref 13.5–17.5)
HGB BLD-MCNC: 7.9 G/DL (ref 13.5–17.5)
HGB BLDA-MCNC: 7.9 G/DL (ref 13.5–17.5)
HGB BLDA-MCNC: 8 G/DL (ref 13.5–17.5)
HGB BLDMV-MCNC: 6.7 G/DL (ref 13.5–17.5)
HGB BLDMV-MCNC: 7.3 G/DL (ref 13.5–17.5)
HGB BLDMV-MCNC: 7.5 G/DL (ref 13.5–17.5)
HOWELL-JOLLY BOD BLD QL SMEAR: PRESENT
IMM GRANULOCYTES # BLD AUTO: 0.68 X10*3/UL (ref 0–0.5)
IMM GRANULOCYTES # BLD AUTO: 1.42 X10*3/UL (ref 0–0.5)
IMM GRANULOCYTES NFR BLD AUTO: 2.9 % (ref 0–0.9)
IMM GRANULOCYTES NFR BLD AUTO: 6.2 % (ref 0–0.9)
INHALED O2 CONCENTRATION: 40 %
INR PPP: 1.4 (ref 0.9–1.1)
LACTATE BLDA-SCNC: 10 MMOL/L (ref 0.4–2)
LACTATE BLDA-SCNC: 6.6 MMOL/L (ref 0.4–2)
LACTATE BLDMV-SCNC: 5.4 MMOL/L (ref 0.4–2)
LACTATE BLDMV-SCNC: 5.6 MMOL/L (ref 0.4–2)
LACTATE BLDMV-SCNC: 6 MMOL/L (ref 0.4–2)
LACTATE BLDV-SCNC: 12.3 MMOL/L (ref 0.4–2)
LACTATE BLDV-SCNC: 3.7 MMOL/L (ref 0.4–2)
LACTATE SERPL-SCNC: 3.5 MMOL/L (ref 0.4–2)
LACTATE SERPL-SCNC: 5.1 MMOL/L (ref 0.4–2)
LYMPHOCYTES # BLD AUTO: 0.82 X10*3/UL (ref 0.8–3)
LYMPHOCYTES # BLD MANUAL: 1.16 X10*3/UL (ref 0.8–3)
LYMPHOCYTES NFR BLD AUTO: 3.5 %
LYMPHOCYTES NFR BLD MANUAL: 5.1 %
MAGNESIUM SERPL-MCNC: 2.36 MG/DL (ref 1.6–2.4)
MAGNESIUM SERPL-MCNC: 2.48 MG/DL (ref 1.6–2.4)
MCH RBC QN AUTO: 34.4 PG (ref 26–34)
MCH RBC QN AUTO: 34.5 PG (ref 26–34)
MCH RBC QN AUTO: 34.8 PG (ref 26–34)
MCHC RBC AUTO-ENTMCNC: 32.9 G/DL (ref 32–36)
MCHC RBC AUTO-ENTMCNC: 35.4 G/DL (ref 32–36)
MCHC RBC AUTO-ENTMCNC: 35.8 G/DL (ref 32–36)
MCV RBC AUTO: 105 FL (ref 80–100)
MCV RBC AUTO: 96 FL (ref 80–100)
MCV RBC AUTO: 98 FL (ref 80–100)
METAMYELOCYTES # BLD MANUAL: 0.21 X10*3/UL
METAMYELOCYTES NFR BLD MANUAL: 0.9 %
MONOCYTES # BLD AUTO: 1.06 X10*3/UL (ref 0.05–0.8)
MONOCYTES # BLD MANUAL: 0.59 X10*3/UL (ref 0.05–0.8)
MONOCYTES NFR BLD AUTO: 4.6 %
MONOCYTES NFR BLD MANUAL: 2.6 %
MYELOCYTES # BLD MANUAL: 0.39 X10*3/UL
MYELOCYTES NFR BLD MANUAL: 1.7 %
NEUTROPHILS # BLD AUTO: 20.57 X10*3/UL (ref 1.6–5.5)
NEUTROPHILS NFR BLD AUTO: 88.9 %
NEUTS SEG # BLD MANUAL: 20.45 X10*3/UL (ref 1.6–5)
NEUTS SEG NFR BLD MANUAL: 89.7 %
NRBC BLD-RTO: 1.4 /100 WBCS (ref 0–0)
NRBC BLD-RTO: 1.7 /100 WBCS (ref 0–0)
NRBC BLD-RTO: 3.7 /100 WBCS (ref 0–0)
OVALOCYTES BLD QL SMEAR: ABNORMAL
OXYHGB MFR BLDA: 94 % (ref 94–98)
OXYHGB MFR BLDA: 95.8 % (ref 94–98)
OXYHGB MFR BLDMV: 43.5 % (ref 45–75)
OXYHGB MFR BLDMV: 51.8 % (ref 45–75)
OXYHGB MFR BLDMV: 57.2 % (ref 45–75)
PCO2 BLDA: 36 MM HG (ref 38–42)
PCO2 BLDA: 36 MM HG (ref 38–42)
PCO2 BLDMV: 41 MM HG (ref 41–51)
PCO2 BLDMV: 43 MM HG (ref 41–51)
PCO2 BLDMV: 51 MM HG (ref 41–51)
PH BLDA: 7.2 PH (ref 7.38–7.42)
PH BLDA: 7.34 PH (ref 7.38–7.42)
PH BLDMV: 7.24 PH (ref 7.33–7.43)
PH BLDMV: 7.26 PH (ref 7.33–7.43)
PH BLDMV: 7.3 PH (ref 7.33–7.43)
PHOSPHATE SERPL-MCNC: 6.5 MG/DL (ref 2.5–4.9)
PHOSPHATE SERPL-MCNC: 7 MG/DL (ref 2.5–4.9)
PHOSPHATE SERPL-MCNC: 7.1 MG/DL (ref 2.5–4.9)
PHOSPHATE SERPL-MCNC: 7.1 MG/DL (ref 2.5–4.9)
PLATELET # BLD AUTO: 105 X10*3/UL (ref 150–450)
PLATELET # BLD AUTO: 113 X10*3/UL (ref 150–450)
PLATELET # BLD AUTO: 91 X10*3/UL (ref 150–450)
PO2 BLDA: 83 MM HG (ref 85–95)
PO2 BLDA: 98 MM HG (ref 85–95)
PO2 BLDMV: 36 MM HG (ref 35–45)
PO2 BLDMV: 38 MM HG (ref 35–45)
PO2 BLDMV: 44 MM HG (ref 35–45)
POLYCHROMASIA BLD QL SMEAR: ABNORMAL
POTASSIUM BLDA-SCNC: 4.4 MMOL/L (ref 3.5–5.3)
POTASSIUM BLDA-SCNC: 5.6 MMOL/L (ref 3.5–5.3)
POTASSIUM BLDMV-SCNC: 4 MMOL/L (ref 3.5–5.3)
POTASSIUM BLDMV-SCNC: 4.3 MMOL/L (ref 3.5–5.3)
POTASSIUM BLDMV-SCNC: 4.4 MMOL/L (ref 3.5–5.3)
POTASSIUM SERPL-SCNC: 4.4 MMOL/L (ref 3.5–5.3)
POTASSIUM SERPL-SCNC: 4.5 MMOL/L (ref 3.5–5.3)
POTASSIUM SERPL-SCNC: 4.7 MMOL/L (ref 3.5–5.3)
PROTHROMBIN TIME: 15.8 SECONDS (ref 9.8–12.8)
RBC # BLD AUTO: 2.21 X10*6/UL (ref 4.5–5.9)
RBC # BLD AUTO: 2.23 X10*6/UL (ref 4.5–5.9)
RBC # BLD AUTO: 2.27 X10*6/UL (ref 4.5–5.9)
RBC MORPH BLD: ABNORMAL
SAO2 % BLDA: 97 % (ref 94–100)
SAO2 % BLDA: 98 % (ref 94–100)
SAO2 % BLDMV: 44 % (ref 45–75)
SAO2 % BLDMV: 52 % (ref 45–75)
SAO2 % BLDMV: 57 % (ref 45–75)
SODIUM BLDA-SCNC: 126 MMOL/L (ref 136–145)
SODIUM BLDA-SCNC: 131 MMOL/L (ref 136–145)
SODIUM BLDMV-SCNC: 132 MMOL/L (ref 136–145)
SODIUM BLDMV-SCNC: 133 MMOL/L (ref 136–145)
SODIUM BLDMV-SCNC: 133 MMOL/L (ref 136–145)
SODIUM SERPL-SCNC: 131 MMOL/L (ref 136–145)
SODIUM SERPL-SCNC: 135 MMOL/L (ref 136–145)
SODIUM SERPL-SCNC: 136 MMOL/L (ref 136–145)
TOTAL CELLS COUNTED BLD: 117
UFH PPP CHRO-ACNC: 0.7 IU/ML
UFH PPP CHRO-ACNC: 0.8 IU/ML
UFH PPP CHRO-ACNC: 0.9 IU/ML
UFH PPP CHRO-ACNC: 0.9 IU/ML
UFH PPP CHRO-ACNC: 1.1 IU/ML
WBC # BLD AUTO: 20.2 X10*3/UL (ref 4.4–11.3)
WBC # BLD AUTO: 22.8 X10*3/UL (ref 4.4–11.3)
WBC # BLD AUTO: 23.2 X10*3/UL (ref 4.4–11.3)

## 2024-02-25 PROCEDURE — 82565 ASSAY OF CREATININE: CPT

## 2024-02-25 PROCEDURE — 85027 COMPLETE CBC AUTOMATED: CPT | Performed by: INTERNAL MEDICINE

## 2024-02-25 PROCEDURE — 84132 ASSAY OF SERUM POTASSIUM: CPT

## 2024-02-25 PROCEDURE — 2500000001 HC RX 250 WO HCPCS SELF ADMINISTERED DRUGS (ALT 637 FOR MEDICARE OP): Performed by: STUDENT IN AN ORGANIZED HEALTH CARE EDUCATION/TRAINING PROGRAM

## 2024-02-25 PROCEDURE — 94003 VENT MGMT INPAT SUBQ DAY: CPT

## 2024-02-25 PROCEDURE — 90945 DIALYSIS ONE EVALUATION: CPT | Performed by: INTERNAL MEDICINE

## 2024-02-25 PROCEDURE — 83605 ASSAY OF LACTIC ACID: CPT | Mod: MUE | Performed by: STUDENT IN AN ORGANIZED HEALTH CARE EDUCATION/TRAINING PROGRAM

## 2024-02-25 PROCEDURE — 99222 1ST HOSP IP/OBS MODERATE 55: CPT | Performed by: STUDENT IN AN ORGANIZED HEALTH CARE EDUCATION/TRAINING PROGRAM

## 2024-02-25 PROCEDURE — 84520 ASSAY OF UREA NITROGEN: CPT

## 2024-02-25 PROCEDURE — 37799 UNLISTED PX VASCULAR SURGERY: CPT

## 2024-02-25 PROCEDURE — 90937 HEMODIALYSIS REPEATED EVAL: CPT | Mod: MUE

## 2024-02-25 PROCEDURE — 83605 ASSAY OF LACTIC ACID: CPT | Performed by: STUDENT IN AN ORGANIZED HEALTH CARE EDUCATION/TRAINING PROGRAM

## 2024-02-25 PROCEDURE — 82330 ASSAY OF CALCIUM: CPT

## 2024-02-25 PROCEDURE — 85384 FIBRINOGEN ACTIVITY: CPT | Performed by: STUDENT IN AN ORGANIZED HEALTH CARE EDUCATION/TRAINING PROGRAM

## 2024-02-25 PROCEDURE — 2500000004 HC RX 250 GENERAL PHARMACY W/ HCPCS (ALT 636 FOR OP/ED): Performed by: STUDENT IN AN ORGANIZED HEALTH CARE EDUCATION/TRAINING PROGRAM

## 2024-02-25 PROCEDURE — 85520 HEPARIN ASSAY: CPT | Mod: MUE | Performed by: STUDENT IN AN ORGANIZED HEALTH CARE EDUCATION/TRAINING PROGRAM

## 2024-02-25 PROCEDURE — 85027 COMPLETE CBC AUTOMATED: CPT

## 2024-02-25 PROCEDURE — 85007 BL SMEAR W/DIFF WBC COUNT: CPT | Performed by: INTERNAL MEDICINE

## 2024-02-25 PROCEDURE — 2500000002 HC RX 250 W HCPCS SELF ADMINISTERED DRUGS (ALT 637 FOR MEDICARE OP, ALT 636 FOR OP/ED): Performed by: STUDENT IN AN ORGANIZED HEALTH CARE EDUCATION/TRAINING PROGRAM

## 2024-02-25 PROCEDURE — C9113 INJ PANTOPRAZOLE SODIUM, VIA: HCPCS

## 2024-02-25 PROCEDURE — 83735 ASSAY OF MAGNESIUM: CPT

## 2024-02-25 PROCEDURE — 99221 1ST HOSP IP/OBS SF/LOW 40: CPT | Performed by: INTERNAL MEDICINE

## 2024-02-25 PROCEDURE — 84100 ASSAY OF PHOSPHORUS: CPT

## 2024-02-25 PROCEDURE — 84100 ASSAY OF PHOSPHORUS: CPT | Mod: MUE

## 2024-02-25 PROCEDURE — 2500000004 HC RX 250 GENERAL PHARMACY W/ HCPCS (ALT 636 FOR OP/ED)

## 2024-02-25 PROCEDURE — 99291 CRITICAL CARE FIRST HOUR: CPT | Performed by: STUDENT IN AN ORGANIZED HEALTH CARE EDUCATION/TRAINING PROGRAM

## 2024-02-25 PROCEDURE — 84520 ASSAY OF UREA NITROGEN: CPT | Performed by: STUDENT IN AN ORGANIZED HEALTH CARE EDUCATION/TRAINING PROGRAM

## 2024-02-25 PROCEDURE — 84132 ASSAY OF SERUM POTASSIUM: CPT | Performed by: STUDENT IN AN ORGANIZED HEALTH CARE EDUCATION/TRAINING PROGRAM

## 2024-02-25 PROCEDURE — 85025 COMPLETE CBC W/AUTO DIFF WBC: CPT

## 2024-02-25 PROCEDURE — 71045 X-RAY EXAM CHEST 1 VIEW: CPT

## 2024-02-25 PROCEDURE — 71045 X-RAY EXAM CHEST 1 VIEW: CPT | Performed by: RADIOLOGY

## 2024-02-25 PROCEDURE — 85520 HEPARIN ASSAY: CPT | Mod: MUE

## 2024-02-25 PROCEDURE — 74018 RADEX ABDOMEN 1 VIEW: CPT | Performed by: RADIOLOGY

## 2024-02-25 PROCEDURE — 84132 ASSAY OF SERUM POTASSIUM: CPT | Performed by: INTERNAL MEDICINE

## 2024-02-25 PROCEDURE — 85730 THROMBOPLASTIN TIME PARTIAL: CPT | Performed by: STUDENT IN AN ORGANIZED HEALTH CARE EDUCATION/TRAINING PROGRAM

## 2024-02-25 PROCEDURE — 2020000001 HC ICU ROOM DAILY

## 2024-02-25 PROCEDURE — 80047 BASIC METABLC PNL IONIZED CA: CPT

## 2024-02-25 PROCEDURE — 83605 ASSAY OF LACTIC ACID: CPT

## 2024-02-25 PROCEDURE — 82947 ASSAY GLUCOSE BLOOD QUANT: CPT

## 2024-02-25 PROCEDURE — 74018 RADEX ABDOMEN 1 VIEW: CPT

## 2024-02-25 RX ORDER — DEXMEDETOMIDINE HYDROCHLORIDE 4 UG/ML
.2-1.5 INJECTION, SOLUTION INTRAVENOUS CONTINUOUS
Status: DISCONTINUED | OUTPATIENT
Start: 2024-02-25 | End: 2024-02-26 | Stop reason: HOSPADM

## 2024-02-25 RX ORDER — VANCOMYCIN HYDROCHLORIDE 1 G/200ML
1000 INJECTION, SOLUTION INTRAVENOUS EVERY 12 HOURS
Status: DISCONTINUED | OUTPATIENT
Start: 2024-02-25 | End: 2024-02-26 | Stop reason: HOSPADM

## 2024-02-25 RX ORDER — NOREPINEPHRINE BITARTRATE 1 MG/ML
INJECTION, SOLUTION INTRAVENOUS
Status: DISPENSED
Start: 2024-02-25 | End: 2024-02-25

## 2024-02-25 RX ADMIN — ASPIRIN 81 MG: 81 TABLET, COATED ORAL at 10:59

## 2024-02-25 RX ADMIN — DEXTROSE MONOHYDRATE 1.8 MCG/KG/MIN: 50 INJECTION, SOLUTION INTRAVENOUS at 19:41

## 2024-02-25 RX ADMIN — CALCIUM CHLORIDE, MAGNESIUM CHLORIDE, DEXTROSE MONOHYDRATE, LACTIC ACID, SODIUM CHLORIDE, SODIUM BICARBONATE AND POTASSIUM CHLORIDE 2700 ML/HR: 3.68; 3.05; 22; 5.4; 6.46; 3.09; .314 INJECTION INTRAVENOUS at 20:57

## 2024-02-25 RX ADMIN — MEROPENEM 1 G: 1 INJECTION, POWDER, FOR SOLUTION INTRAVENOUS at 04:49

## 2024-02-25 RX ADMIN — DEXTROSE MONOHYDRATE 1.8 MCG/KG/MIN: 50 INJECTION, SOLUTION INTRAVENOUS at 13:18

## 2024-02-25 RX ADMIN — CALCIUM CHLORIDE, MAGNESIUM CHLORIDE, DEXTROSE MONOHYDRATE, LACTIC ACID, SODIUM CHLORIDE, SODIUM BICARBONATE AND POTASSIUM CHLORIDE 2700 ML/HR: 5.15; 2.03; 22; 5.4; 6.46; 3.09; .157 INJECTION INTRAVENOUS at 00:27

## 2024-02-25 RX ADMIN — SENNOSIDES AND DOCUSATE SODIUM 2 TABLET: 8.6; 5 TABLET ORAL at 10:59

## 2024-02-25 RX ADMIN — MEROPENEM 1 G: 1 INJECTION, POWDER, FOR SOLUTION INTRAVENOUS at 16:41

## 2024-02-25 RX ADMIN — DEXTROSE MONOHYDRATE 2 MCG/KG/MIN: 50 INJECTION, SOLUTION INTRAVENOUS at 10:43

## 2024-02-25 RX ADMIN — PANTOPRAZOLE SODIUM 40 MG: 40 INJECTION, POWDER, FOR SOLUTION INTRAVENOUS at 10:05

## 2024-02-25 RX ADMIN — AZITHROMYCIN 250 MG: 500 INJECTION, POWDER, LYOPHILIZED, FOR SOLUTION INTRAVENOUS at 12:02

## 2024-02-25 RX ADMIN — VASOPRESSIN 0.06 UNITS/MIN: 0.2 INJECTION INTRAVENOUS at 21:28

## 2024-02-25 RX ADMIN — DEXTROSE MONOHYDRATE 1.8 MCG/KG/MIN: 50 INJECTION, SOLUTION INTRAVENOUS at 21:29

## 2024-02-25 RX ADMIN — DEXTROSE MONOHYDRATE 1.8 MCG/KG/MIN: 50 INJECTION, SOLUTION INTRAVENOUS at 22:59

## 2024-02-25 RX ADMIN — DEXTROSE MONOHYDRATE 2 MCG/KG/MIN: 50 INJECTION, SOLUTION INTRAVENOUS at 18:29

## 2024-02-25 RX ADMIN — VASOPRESSIN 0.06 UNITS/MIN: 0.2 INJECTION INTRAVENOUS at 04:48

## 2024-02-25 RX ADMIN — VANCOMYCIN HYDROCHLORIDE 1000 MG: 1 INJECTION, SOLUTION INTRAVENOUS at 01:29

## 2024-02-25 RX ADMIN — HYDROCORTISONE SODIUM SUCCINATE 50 MG: 100 INJECTION, POWDER, FOR SOLUTION INTRAMUSCULAR; INTRAVENOUS at 17:10

## 2024-02-25 RX ADMIN — DEXTROSE MONOHYDRATE 2 MCG/KG/MIN: 50 INJECTION, SOLUTION INTRAVENOUS at 02:08

## 2024-02-25 RX ADMIN — Medication 75 MCG/HR: at 14:55

## 2024-02-25 RX ADMIN — VANCOMYCIN HYDROCHLORIDE 1000 MG: 1 INJECTION, SOLUTION INTRAVENOUS at 13:57

## 2024-02-25 RX ADMIN — DEXTROSE MONOHYDRATE 2 MCG/KG/MIN: 50 INJECTION, SOLUTION INTRAVENOUS at 07:46

## 2024-02-25 RX ADMIN — DEXTROSE MONOHYDRATE 2 MCG/KG/MIN: 50 INJECTION, SOLUTION INTRAVENOUS at 04:48

## 2024-02-25 RX ADMIN — HEPARIN SODIUM AND DEXTROSE 1400 UNITS/HR: 10000; 5 INJECTION INTRAVENOUS at 07:26

## 2024-02-25 RX ADMIN — POLYETHYLENE GLYCOL 3350 17 G: 17 POWDER, FOR SOLUTION ORAL at 11:00

## 2024-02-25 RX ADMIN — EPINEPHRINE 0.02 MCG/KG/MIN: 1 INJECTION INTRAMUSCULAR; INTRAVENOUS; SUBCUTANEOUS at 01:01

## 2024-02-25 RX ADMIN — CALCIUM CHLORIDE, MAGNESIUM CHLORIDE, DEXTROSE MONOHYDRATE, LACTIC ACID, SODIUM CHLORIDE, SODIUM BICARBONATE AND POTASSIUM CHLORIDE 2700 ML/HR: 3.68; 3.05; 22; 5.4; 6.46; 3.09; .314 INJECTION INTRAVENOUS at 09:35

## 2024-02-25 RX ADMIN — DEXTROSE MONOHYDRATE 1.8 MCG/KG/MIN: 50 INJECTION, SOLUTION INTRAVENOUS at 12:11

## 2024-02-25 RX ADMIN — HYDROCORTISONE SODIUM SUCCINATE 50 MG: 100 INJECTION, POWDER, FOR SOLUTION INTRAMUSCULAR; INTRAVENOUS at 04:47

## 2024-02-25 RX ADMIN — INSULIN LISPRO 1 UNITS: 100 INJECTION, SOLUTION INTRAVENOUS; SUBCUTANEOUS at 09:47

## 2024-02-25 RX ADMIN — DEXTROSE MONOHYDRATE 1.6 MCG/KG/MIN: 50 INJECTION, SOLUTION INTRAVENOUS at 16:45

## 2024-02-25 RX ADMIN — HEPARIN SODIUM AND DEXTROSE 1200 UNITS/HR: 10000; 5 INJECTION INTRAVENOUS at 10:58

## 2024-02-25 RX ADMIN — DEXTROSE MONOHYDRATE 2 MCG/KG/MIN: 50 INJECTION, SOLUTION INTRAVENOUS at 00:56

## 2024-02-25 RX ADMIN — VASOPRESSIN 0.06 UNITS/MIN: 0.2 INJECTION INTRAVENOUS at 16:04

## 2024-02-25 RX ADMIN — SENNOSIDES AND DOCUSATE SODIUM 2 TABLET: 8.6; 5 TABLET ORAL at 21:28

## 2024-02-25 RX ADMIN — HYDROCORTISONE SODIUM SUCCINATE 50 MG: 100 INJECTION, POWDER, FOR SOLUTION INTRAMUSCULAR; INTRAVENOUS at 10:04

## 2024-02-25 RX ADMIN — HYDROCORTISONE SODIUM SUCCINATE 50 MG: 100 INJECTION, POWDER, FOR SOLUTION INTRAMUSCULAR; INTRAVENOUS at 21:29

## 2024-02-25 RX ADMIN — DEXTROSE MONOHYDRATE 1.8 MCG/KG/MIN: 50 INJECTION, SOLUTION INTRAVENOUS at 08:59

## 2024-02-25 RX ADMIN — VASOPRESSIN 0.06 UNITS/MIN: 0.2 INJECTION INTRAVENOUS at 10:44

## 2024-02-25 RX ADMIN — INSULIN LISPRO 1 UNITS: 100 INJECTION, SOLUTION INTRAVENOUS; SUBCUTANEOUS at 17:13

## 2024-02-25 RX ADMIN — CLOPIDOGREL BISULFATE 75 MG: 75 TABLET ORAL at 11:00

## 2024-02-25 ASSESSMENT — PAIN - FUNCTIONAL ASSESSMENT

## 2024-02-25 ASSESSMENT — PAIN SCALES - GENERAL
PAINLEVEL_OUTOF10: 0 - NO PAIN

## 2024-02-25 NOTE — CONSULTS
Reason For Consult  Evaluation of CT findings of possible bilateral pneumonia     History Of Present Illness  Tesfaye Milton is a 78 y.o. male  with PMH of CAD s/p multiple stents with known PCI to LAD and LCx (2011), Afib failed multiple DCCV, ablation and antiarrythmic therapies, currently on anticoagulation, HFrEF (20-25% LVEF), moderate AS, HTN, prostate cancer s/p brachytherapy (2022), presenting with cardiogenic shock. Initial Reserve numbers demonstrated elevated filling pressures and low cardio index despite pharmacologic support with dobutamine and levophed so he underwent IABP placement on 2/19. Then he underwent revascularization with PCI to the LAD and LCX 2/22 without complication. His course was complicated by JEFF and cardiac arrest on 2/24. He  had a CT TAVR on 2/23 bilateral GGO  that could be explained by csevere pulmonary edema. However, multifocal pneumonia could not be excluded.    Currently he is intubated and sedated. MV settings are 550/18/0.4/8.  On levo and vaso, off dobutamine.   On CVVH with net even fluid removal goal.        Past Medical History  He has a past medical history of Anxiety disorder, unspecified, Atrial fibrillation (CMS/[facility]), CAD (coronary artery disease), CHF (congestive heart failure) (CMS/[facility]), High cholesterol, Hypertension, Long term (current) use of anticoagulants, Other conditions influencing health status, Personal history of other diseases of male genital organs, Personal history of other diseases of the circulatory system (07/26/2013), Personal history of other endocrine, nutritional and metabolic disease (07/26/2013), and Prostate disorder.    Surgical History  He has a past surgical history that includes Umbilical hernia repair (04/08/2013); Other surgical history (04/08/2013); Appendectomy (04/08/2013); Knee arthroscopy w/ debridement (04/08/2013); Cardiac catheterization (N/A, 2/19/2024); and Cardiac catheterization (N/A, 2/19/2024).     Social History  He reports  "that he has quit smoking. His smoking use included cigarettes. He smoked an average of .25 packs per day. He has never used smokeless tobacco. He reports that he does not currently use alcohol after a past usage of about 7.0 standard drinks of alcohol per week. He reports that he does not use drugs.    Family History  Family History   Problem Relation Name Age of Onset    Stroke Mother      Dementia Mother      Stroke Father          Allergies  Patient has no known allergies.    Review of Systems  Unable to obtain from the patient.       Physical Exam  Constitutional: Ill-appearing male in no acute distress.  HEENT: NCAT. [place] in R. internal jugular.  Respiratory: Intubated. Rhonchi in b/l lung fields.   Cardiovascular: Augmented by balloon pump, irregular heart rate  Abdominal: Soft, nondistended,  Neuro: Intubated and Sedated, RASS -5  MSK: WWP, b/l PT doppler +, no LE edema or asymmetry  Skin: Access sites without bruising or hematoma     Last Recorded Vitals  Blood pressure (!) 106/40, pulse 109, temperature 35.5 °C (95.9 °F), temperature source Temporal, resp. rate 18, height 1.905 m (6' 3\"), weight 111 kg (244 lb 11.4 oz), SpO2 98 %.    Relevant Results  CT TAVR no contrast chest abdomen pelvis 02/23/2024    Narrative  Interpreted By:  Stan Topete and Ravi Shweta  STUDY:  CT TAVR NO CONTRAST CHEST ABDOMEN PELVIS;  2/23/2024 1:53 pm    INDICATION:  Signs/Symptoms:bilateral access.    COMPARISON:  Chest CT 10/28/2021, echocardiogram 02/23/2020    ACCESSION NUMBER(S):  [id number]    ORDERING CLINICIAN:  PARK ANDERSON    TECHNIQUE:  Multi-detector CT technology was employed. Axial sequential  multidetector acquisition of the chest with prospective EKG-gating  and minimal slice thickness was performed without the intravenous  administration of contrast material. Subsequently, helically acquired  unenhanced CT examination of the abdomen and pelvis was obtained.    FINDINGS:  Potential study limitations:  " Absence of intravenous contrast  precludes assessment of vascular patency.    THORACIC AORTA AND HEART:  The thoracic aorta normal in course and caliber.There is mild  scattered calcified atherosclerosis present. A intra-aortic balloon  pump originating from the left femoral artery is present, terminating  within the proximal descending thoracic aorta. Evaluation of the  aorta is limited by respiratory motion artifact. The sinotubular  junction is  preserved. Within limitations from absence of  intravenous contrast, there is no evidence for acute aortic  pathology, such as intramural hematoma, or contained rupture. The  arch vessel branching pattern is  conventional. All of the arch  branch vessels appear normal in caliber in their proximal portions.    Normal atrioventricular and ventriculoarterial concordance. The  severe cardiomegalywith four chamber enlargement. There are  significant calcifications of aortic valve, in keeping with patients  history of aortic stenosis. Normal coronary artery origins.Severe  coronary artery calcifications are seen. Please note,the study is not  optimized for evaluation of coronary arteries. Coronary stents are  present within the LAD and LCX. There is no pericardial effusion seen.    PULMONARY ARTERIES  The pulmonary artery is dilated, measuring 3.3 cm at the trunk. There  is normal ratio of arterial caliber between the pulmonary artery and  the thoracic aorta. Lack of contrast opacification of the pulmonary  arteries precludes assessment of pulmonary embolism.    SYSTEMIC AND PULMONARY VEINS  Normal systemic venous and pulmonary venous return.  The IVC is distended in the setting of cardiogenic shock.  Normal pulmonary venous anatomy.    Right upper extremity PICC is present with distal tip within the  right atrium. Right internal jugular approach [place] is present  with distal tip within the right pulmonary artery.    CHEST:  The visualized thyroid is mildly heterogenous in  appearance and  demonstrates nodularity. No evidence of thoracic lymphadenopathy by  CT criteria. Few small subcentimeter sized mediastinal lymph nodes  are felt to be reactive in nature. Esophagus appears within normal  limits as seen.    The trachea and central airways are patent. There is no endobronchial  lesion, although there is a large amount of mucous material seen  within trachea and central airways, particularly involving the right  lower lobe bronchus. There is focal consolidative opacities within  the basilar aspects of the bilateral lower lobes which may reflect a  superimposed aspiration pneumonitis. There is severe central alveolar  ground-glass opacitiesshowing dependent gradient, likely representing  combination of dominant alveolar pulmonary edema. There is minimal  interstitial component. Atypical infectious process can result in  similar imaging findings and cannot be excluded in appropriate  clinical context. Extensive parenchymal opacification limits  evaluation of pulmonary nodules. No pleural effusion or pneumothorax.      ABDOMINAL AORTA:  The abdominal aorta and its branches demonstrate scattered calcified  plaques. The celiac artery, SMA, MATTI and bilateral renal arteries are  normal in caliber.    ABDOMEN AND PELVIS:  Evaluation is limited by motion artifact.    HEPATOBILIARY SYSTEM:  The liver is normal in size.    GALLBLADDER:  The gallbladder is nondistended. The gallbladder contains hyperdense  fluid which may represent gallbladder sludge. The intrahepatic and  extrahepatic bile ducts are not dilated.    PANCREAS:  Fatty atrophic changes of the pancreatic parenchyma. No ductal  dilation.No suspicious focal lesions identified.    SPLEEN:  The spleen is normal in size.There are multiple calcific foci seen,  consistent with benign granulomas. Otherwise, no suspicious focal  lesions    ADRENAL GLANDS:  The bilateral adrenal glands are unremarkable in appearance.    KIDNEYS AND  URETERS:  Kidneys are normal in size. There is no evidence of  hydroureteronephrosis. Evaluation of the renal parenchyma is limited  by motion artifact. There is hyperdense foci within the inferior pole  of the kidneys which may represent vascular calcifications or  nonobstructive renal calculi.    GI TRACT:  The stomach is unremarkable. The small bowel is normal in caliber  without evidence of focal wall thickening or obstruction. The  appendix is not definitely visualized, however there are no pericecal  inflammatory changes. There is distension of the large bowel stool  and air. No dilation of the colon is present. Note is made of  relative tapering of air to the level of the sigmoid colon where  there is relative collapse of the sigmoid colon and rectum.    PERITONEUM/RETROPERITONEUM/LYMPH NODES:  No abdominopelvic lymphadenopathy is present. There is no focal fluid  collection, free intraperitoneal air, or ascites.    GENITOURINARY SYSTEM:  Within the pelvis, the urinary bladder is decompressed with a Spence  catheter in place. The prostate is enlarged with multiple  brachytherapy seeds. There are no pelvic masses.    BODY WALL AND OSSEOUS STRUCTURES:  Bilateral fat containing inguinal hernias. Abdominal wall edema.  Asymmetric soft tissue/edema at the intra-aortic balloon pump  catheter placement site. No acute osseous pathology.There are no  suspicious osseous lesions.Mild multilevel degenerative changes  within visualized spine.    Impression  1.  Mild atherosclerotic changes involving the thoracoabdominal aorta  and its branches.Please note that, the assessment of vascular  patency, including access vessels, is not possible in absence of  intravenous contrast.  2. Moderate calcifications of the aortic valve correlating with  history of aortic stenosis.  3. Centrally predominant alveolar ground-glass opacification likely  corresponding to severe alveolar pulmonary edema in the setting of  cardiogenic shock. A  superimposed infectious/inflammatory process is  suspected given areas of bronchial opacification, predominantly  involving the right lower lobe pulmonary bronchus.  4. Additional findings as above.    I personally reviewed the images/study and I agree with the resident  findings as stated by Madeleine Eng MD. This study was interpreted at  [facility], [place], [place].    MACRO:  None    Signed by: Stan Topete 2/23/2024 3:57 PM  Dictation workstation:   [id number]      Assessment/Plan     Tesfaye Milton is a 78 y.o. male  with PMH of CAD, HFrEF (20-25% LVEF), moderate AS,  who presented with cardiogenic shock. underwent IABP placement and PCI to the LAD and LCX. His course was complicated by JEFF and cardiac arrest on 2/24. He  had a CT TAVR on 2/23 bilateral GGO  that could be explained by csevere pulmonary edema. However, multifocal pneumonia could not be excluded. Pulmonary team were consulted for further evaluation of his lung finding in the CT TAVR.     #cardiogenic shock  # bilateral GGO / concern for pulmonary edema and/or multifocal pneumonia   The bilateral infiltration on his CXRs is present since 2/13 ( the day of his initial presentation). with his history of cardiogenic shock, the fast improvement in his fiO2 requirement in the last 24 hours and it's appearance on the CT chest, it is most consistent with severe pulmonary edema. However, superimposed infection can not be ruled out.     Please continue fluid removal as appropriate.  Please continue antibiotics, will defer to ID team for choice and durations of antibiotics.   Please continue to wean off fiO2 and PEEP.   Follow up results of respiratory viral PCR and tracheal aspirate from 2/24.    Assessment and plan were discussed with Dr.Avasarala Coreen Barbour MD

## 2024-02-25 NOTE — PROGRESS NOTES
Renal Staff CVVH Note    Patient seen, examined on CVVH  No significant filter issues overnight  Fluid removal 300 per hour  Urine output, minimal  Effluent bag clear  Access L int jug    Sedated, intubated, F1O2 50%  Pressor requirement mult    Electrolytes, acid base acceptable  Medications reviewed    No CVVH script change  Fluid removal per primary team

## 2024-02-25 NOTE — CONSULTS
Inpatient consult to Infectious Diseases  Consult performed by: Katharina Jacobs MD  Consult ordered by: Jose Pimentel DO        Primary MD: Delia Araujo MD    Reason For Consult Septic shock, antibiotics guidance  History Of Present Illness  Tesfaye Milton is a 78 y.o. male with PMH of CAD s/p multiple stents with PCI, HFrEF (20-25% LVEF), prostate cancer s/p brachytherapy (2022) who presented as a transfer from [facility] in cardiogenic shock on 2/19.     Patient initially presented on 2/13 to  having been referred by his primary cardiologist for SOB and hypotension, was admitted in cardiogenic shock, started on dobutamine and escalated with addition of norepi. Also was treated for presumed UTI with zosyn. Patient underwent left heart cath on 2/19 showing multivessel disease. Patient was transferred to [facility] for further management of his cardiogenic shock on 2/19.  Patient had IABP placement on 2/19, and CVVH for JEFF. Underwent revascularization with PCI on 2/22. Currently not a candidate for LVAD or transplant. C/f low flow low gradient aortic stenosis however CT TAVR w/ moderate calcification of aortic valve, no indication for  balloon aortic valvuloplasty at this time. Patient with steadily increasing levo requirements and stable CI. Patient had PEA and then VT cardiac arrest, now intubated and sedated on 2/24. Has currently concern for component of septic shock and multifocal pneumonia based on CT imaging on 2/23. ID was consulted on 2/24.      Past Medical History  He has a past medical history of Anxiety disorder, unspecified, Atrial fibrillation (CMS/[facility]), CAD (coronary artery disease), CHF (congestive heart failure) (CMS/[facility]), High cholesterol, Hypertension, Long term (current) use of anticoagulants, Other conditions influencing health status, Personal history of other diseases of male genital organs, Personal history of other diseases of the circulatory system (07/26/2013), Personal history of other  endocrine, nutritional and metabolic disease (07/26/2013), and Prostate disorder.    Surgical History  He has a past surgical history that includes Umbilical hernia repair (04/08/2013); Other surgical history (04/08/2013); Appendectomy (04/08/2013); Knee arthroscopy w/ debridement (04/08/2013); Cardiac catheterization (N/A, 2/19/2024); and Cardiac catheterization (N/A, 2/19/2024).    Allergies  Patient has no known allergies.     Objective  Range of Vitals (last 24 hours)  Heart Rate:  []   Temp:  [35.5 °C (95.9 °F)-36.6 °C (97.9 °F)]   Resp:  [6-43]   BP: ()/(37-62)   Weight:  [111 kg (244 lb 11.4 oz)]   SpO2:  [48 %-99 %]   Daily Weight  02/25/24 : 111 kg (244 lb 11.4 oz)    Body mass index is 30.59 kg/m².     Physical Exam  General: Intubated, sedated   CVS: Irregular on balloon pump  RESP: ctab, rhonchi, no increased wob, FiO2 40%, PEEP 8  Abd: Soft and lax. ND.   Ext: No swelling of the LE b/l. Access sites without erythema  On CVVH and swan catheter in R internal jugular without erythema  Relevant Results    Labs  Results from last 72 hours   Lab Units 02/25/24  0512 02/25/24  0120 02/24/24  1931 02/24/24  1408   WBC AUTO x10*3/uL 20.2* 23.2* 25.6* 20.1*   HEMOGLOBIN g/dL 7.9* 7.7* 7.9* 8.0*   HEMATOCRIT % 22.3* 21.5* 21.5* 23.5*   PLATELETS AUTO x10*3/uL 105* 113* 113* 97*   NEUTROS PCT AUTO %  --  88.9 91.4 88.5   LYMPHS PCT AUTO %  --  3.5 2.3 4.8   MONOS PCT AUTO %  --  4.6 4.0 3.5   EOS PCT AUTO %  --  0.0 0.0 0.0     Imaging  IMPRESSION: CT TAVR 2/23  1.  Mild atherosclerotic changes involving the thoracoabdominal aorta  and its branches.Please note that, the assessment of vascular  patency, including access vessels, is not possible in absence of  intravenous contrast.  2. Moderate calcifications of the aortic valve correlating with  history of aortic stenosis.  3. Centrally predominant alveolar ground-glass opacification likely  corresponding to severe alveolar pulmonary edema in the setting  of  cardiogenic shock. A superimposed infectious/inflammatory process is  suspected given areas of bronchial opacification, predominantly  involving the right lower lobe pulmonary bronchus.  4. Additional findings as above.    IMPRESSION: Chest X-ray 2/24   1. Interval placement of an endotracheal tube with the tip projecting  6.4 cm above the linda. Additional medical devices as described  above.  2. Persistent patchy opacities throughout bilateral lungs with mild  interval improvement in left mid lung field opacification. Correlate  with multifocal pneumonia, edema or ARDS.    Microbiology  2/13 Bcx neg           Ucx neg  2/19 Bcx neg  2/21 MRSA nare neg  2/23 Bcx NGTD  2/24 Respiratory mixed     Antimicrobial agents  2/13-2/16 Ceftriaxone  2/18-2/24 Pipe/tazon  2/21- Vancomycin  2/24- Meropenem  2/24-2/25 Azithromycin     Assessment/Plan  #Cardiogenic shock post PEA and then VT cardiac arrest  #Patchy opacities throughout bilateral lungs c/f pulmonary edema and/or multifocal pneumonia     A 79 yo M with PMHs of CAD and HFrEF (20-25% LVEF) had cardiogenic shock requiring for IABP, PCI and CVVH due to JEFF, and developed cardiac arrest on 2/24. Patient had CT TAVR (2/23) showing GGO likely consistent with   pulmonary edema in the setting of cardiogenic shock. Patient has quick improvement in his oxygen requirement in the last 24 hours. However, it would be reasonable to treat for multifocal pneumonia that could not be excluded given his current conditions.      From ID standpoint, we can stop azithromycin given possible pneumonia in setting of complicated cardiogenic events in hospitals. Would recommend IV meropenem and vancomycin at this point.    Recommendations  -Please continue IV meropenem and vancomycin (renally CVVH dose)   -Please stop azithromycin   -Will follow on respiratory culture    Discussed with Dr. Boyer. Please text me via Epic chat if you have any questions or concerns regarding this patient.  ID will continue to follow up this patient.    Katharina Jacobs MD  ID fellow [id number]  Team A Pager [phone]

## 2024-02-25 NOTE — CARE PLAN
Problem: Skin  Goal: Decreased wound size/increased tissue granulation at next dressing change  2/24/2024 2055 by Wesly Lara RN  Outcome: Progressing  Flowsheets (Taken 2/24/2024 0632 by Francesco Alonzo RN)  Decreased wound size/increased tissue granulation at next dressing change: Protective dressings over bony prominences  2/24/2024 2055 by Wesly Lara RN  Outcome: Progressing  Flowsheets (Taken 2/24/2024 0632 by Francesco Alonzo RN)  Decreased wound size/increased tissue granulation at next dressing change: Protective dressings over bony prominences  Goal: Participates in plan/prevention/treatment measures  2/24/2024 2055 by Wesly Lara RN  Outcome: Progressing  Flowsheets (Taken 2/24/2024 0632 by Francesco Alonzo RN)  Participates in plan/prevention/treatment measures:   Discuss with provider PT/OT consult   Elevate heels  2/24/2024 2055 by Wesly Lara RN  Outcome: Progressing  Flowsheets (Taken 2/24/2024 0632 by Francesco Alonzo RN)  Participates in plan/prevention/treatment measures:   Discuss with provider PT/OT consult   Elevate heels  Goal: Prevent/manage excess moisture  2/24/2024 2055 by Wesly Lara RN  Outcome: Progressing  Flowsheets (Taken 2/24/2024 0632 by Francesco Alonzo RN)  Prevent/manage excess moisture:   Use wicking fabric (obtain order)   Moisturize dry skin  2/24/2024 2055 by Wesly Lara RN  Outcome: Progressing  Flowsheets (Taken 2/24/2024 0632 by Francesco Alonzo RN)  Prevent/manage excess moisture:   Use wicking fabric (obtain order)   Moisturize dry skin  Goal: Prevent/minimize sheer/friction injuries  2/24/2024 2055 by Wesly Lara RN  Outcome: Progressing  Flowsheets (Taken 2/24/2024 0632 by Francesco Alonzo RN)  Prevent/minimize sheer/friction injuries: Use pull sheet  2/24/2024 2055 by Wesly Lara RN  Outcome: Progressing  Flowsheets (Taken 2/24/2024 0632 by Francesco Alonzo RN)  Prevent/minimize sheer/friction injuries: Use pull sheet  Goal: Promote/optimize nutrition  2/24/2024  2055 by Wesly Lara RN  Outcome: Progressing  Flowsheets (Taken 2/24/2024 0632 by Francesco Alonzo RN)  Promote/optimize nutrition: Monitor/record intake including meals  2/24/2024 2055 by Wesly Lara RN  Outcome: Progressing  Flowsheets (Taken 2/24/2024 0632 by Francesco Alonzo RN)  Promote/optimize nutrition: Monitor/record intake including meals  Goal: Promote skin healing  2/24/2024 2055 by Wesly Lara RN  Outcome: Progressing  Flowsheets (Taken 2/24/2024 0632 by Francesco Alonzo RN)  Promote skin healing: Turn/reposition every 2 hours/use positioning/transfer devices  2/24/2024 2055 by Wesly Lara RN  Outcome: Progressing  Flowsheets (Taken 2/24/2024 0632 by Francesco Alonzo RN)  Promote skin healing: Turn/reposition every 2 hours/use positioning/transfer devices   The patient's goals for the shift include      The clinical goals for the shift include pt will remain hemodynamically stable during this shift

## 2024-02-25 NOTE — CARE PLAN
Problem: Skin  Goal: Decreased wound size/increased tissue granulation at next dressing change  Outcome: Progressing  Flowsheets (Taken 2/24/2024 0632 by Francesco Alonzo RN)  Decreased wound size/increased tissue granulation at next dressing change: Protective dressings over bony prominences  Goal: Participates in plan/prevention/treatment measures  Outcome: Progressing  Flowsheets (Taken 2/24/2024 0632 by Francesco Alonzo RN)  Participates in plan/prevention/treatment measures:   Discuss with provider PT/OT consult   Elevate heels  Goal: Prevent/manage excess moisture  Outcome: Progressing  Flowsheets (Taken 2/24/2024 0632 by Francesco Alonzo RN)  Prevent/manage excess moisture:   Use wicking fabric (obtain order)   Moisturize dry skin  Goal: Prevent/minimize sheer/friction injuries  Outcome: Progressing  Flowsheets (Taken 2/24/2024 0632 by Francesco Alonzo RN)  Prevent/minimize sheer/friction injuries: Use pull sheet  Goal: Promote/optimize nutrition  Outcome: Progressing  Flowsheets (Taken 2/24/2024 0632 by Francesco Alonzo RN)  Promote/optimize nutrition: Monitor/record intake including meals  Goal: Promote skin healing  Outcome: Progressing  Flowsheets (Taken 2/24/2024 0632 by Francesco Alonzo RN)  Promote skin healing: Turn/reposition every 2 hours/use positioning/transfer devices   The patient's goals for the shift include      The clinical goals for the shift include pt will remain hemodynamically stable during this shift

## 2024-02-25 NOTE — PROGRESS NOTES
"Vancomycin Dosing by Pharmacy- FOLLOW UP    Tesfaye Milton is a 78 y.o. year old male who Pharmacy has been consulted for vancomycin dosing for pneumonia. Based on the patient's indication and renal status this patient is being dosed based on a goal trough/random level of 15-20.     Patient restarted on CVVH this morning.    Visit Vitals  BP (!) 88/44   Pulse (!) 112   Temp 35.9 °C (96.6 °F) (Temporal)   Resp 16        Lab Results   Component Value Date    CREATININE 3.61 (H) 02/24/2024    CREATININE 3.20 (H) 02/24/2024    CREATININE 2.79 (H) 02/24/2024    CREATININE 2.31 (H) 02/23/2024        Patient weight is No results found for: \"PTWEIGHT\"    No results found for: \"CULTURE\"     I/O last 3 completed shifts:  In: 2554.2 (23.2 mL/kg) [I.V.:1408.2 (12.8 mL/kg); Blood:296; IV Piggyback:850]  Out: 875 (7.9 mL/kg) [Urine:875 (0.2 mL/kg/hr)]  Weight: 110.2 kg   [unfilled]    Lab Results   Component Value Date    PATIENTTEMP 37.0 02/24/2024    PATIENTTEMP 37.0 02/24/2024    PATIENTTEMP 37.0 02/24/2024        Assessment/Plan    Prevous vanco regimen was dosed after each level.  Will change to scheduled dosing 1g Q12H.  The next level will be obtained on 2/26 at 0100 before the 3rd dose.  Will continue to monitor renal function daily while on vancomycin and order serum creatinine at least every 48 hours if not already ordered.  Follow for continued vancomycin needs, clinical response, and signs/symptoms of toxicity.       Singh Black, PharmD           "

## 2024-02-25 NOTE — PROGRESS NOTES
"Tesfaye Milton is a 78 y.o. male on day 6 of admission presenting with Shock (CMS/HCC).    Subjective   Pt with PEA then VT arrest yesterday. Following commands after arrest, S/p intubation. Following this, pt placed on CVVH and required epi support. Pt intubated and sedated this am, RASS -5.       Objective     Physical Exam   Constitutional: Ill-appearing male in no acute distress.  HEENT: NCAT. [place] in R. internal jugular.  Respiratory: Intubated. Rhonchi in b/l lung fields. 40%/8/550/18  Cardiovascular: Augmented by balloon pump, irregular heart rate  Abdominal: Soft, nondistended,  Neuro: Intubated and Sedated, RASS -5  MSK: WWP, b/l PT doppler +, no LE edema or asymmetry  Skin: Access sites without bruising or hematoma      Last Recorded Vitals  Blood pressure (!) 88/44, pulse (!) 119, temperature 35.6 °C (96.1 °F), resp. rate 18, height 1.905 m (6' 3\"), weight 111 kg (244 lb 11.4 oz), SpO2 99 %.  Intake/Output last 3 Shifts:  I/O last 3 completed shifts:  In: 4284.8 (38.6 mL/kg) [I.V.:3784.8 (34.1 mL/kg); IV Piggyback:500]  Out: 1891 (17 mL/kg) [Urine:435 (0.1 mL/kg/hr); Other:1456]  Weight: 111 kg     Relevant Results  Scheduled medications  aspirin, 81 mg, oral, Daily  azithromycin, 250 mg, intravenous, q24h  clopidogrel, 75 mg, oral, Daily  fentaNYL, 25 mcg, intravenous, Once  hydrocortisone sodium succinate, 50 mg, intravenous, q6h  insulin lispro, 0-5 Units, subcutaneous, TID with meals  meropenem, 1 g, intravenous, q12h  midazolam, 1 mg, intravenous, Once  pantoprazole, 40 mg, intravenous, Daily  perflutren protein A microsphere, 0.5 mL, intravenous, Once in imaging  perflutren protein A microsphere, 0.5 mL, intravenous, Once in imaging  polyethylene glycol, 17 g, oral, Daily  sennosides-docusate sodium, 2 tablet, oral, BID  sulfur hexafluoride microsphr, 2 mL, intravenous, Once in imaging  sulfur hexafluoride microsphr, 2 mL, intravenous, Once in imaging  vancomycin, 1,000 mg, intravenous, " q12h      Continuous medications  DOBUTamine, 2.5-20 mcg/kg/min, Last Rate: 5 mcg/kg/min (02/25/24 0600)  EPINEPHrine, 0-2 mcg/kg/min, Last Rate: Stopped (02/25/24 0301)  fentaNYL, 0-300 mcg/hr, Last Rate: 75 mcg/hr (02/25/24 0600)  heparin, 0-4,500 Units/hr, Last Rate: 1,400 Units/hr (02/25/24 0600)  midazolam, 0-20 mg/hr, Last Rate: 2 mg/hr (02/25/24 0600)  norepinephrine, 0.01-1 mcg/kg/min, Last Rate: 1.8 mcg/kg/min (02/25/24 0640)  PrismaSol 4/2.5, 2,700 mL/hr  vasopressin, 0.01-0.06 Units/min, Last Rate: 0.06 Units/min (02/25/24 0600)      PRN medications  PRN medications: acetaminophen, dextrose 10 % in water (D10W), dextrose, glucagon, heparin, melatonin, midazolam, oxyCODONE, oxygen     Lab Results   Component Value Date    WBC 20.2 (H) 02/25/2024    HGB 7.9 (L) 02/25/2024    HCT 22.3 (L) 02/25/2024     (L) 02/25/2024    CHOL 147 12/15/2023    TRIG 86 12/15/2023    HDL 47.6 12/15/2023     (H) 02/24/2024     (H) 02/24/2024     02/25/2024    K 4.4 02/25/2024    CL 97 (L) 02/25/2024    CREATININE 3.37 (H) 02/25/2024    BUN 89 (H) 02/25/2024    CO2 24 02/25/2024    TSH 0.82 12/15/2023    PSA 7.7 (H) 08/12/2020    INR 1.4 (H) 02/25/2024    HGBA1C 5.4 02/19/2024         Assessment/Plan   Principal Problem:    Shock (CMS/HCC)  Active Problems:    Atherosclerotic heart disease of native coronary artery without angina pectoris    Hypercholesterolemia    Shortness of breath    UTI (urinary tract infection)    Atrial flutter (CMS/HCC)    Cardiomyopathy (CMS/HCC)    Chronic atrial fibrillation (CMS/HCC)    Persistent atrial fibrillation (CMS/HCC)    Cigarette smoker    Essential hypertension    Heart failure (CMS/HCC)    History of coronary artery stent placement    Myocardial infarction (CMS/HCC)    Congestive heart failure (CMS/HCC)    Aortic stenosis    History of percutaneous coronary intervention    Cardiogenic shock (CMS/HCC)    NSTEMI (non-ST elevated myocardial infarction)  (CMS/[facility])  Mr Tesfaye Milton is a 78 y.o. male with PMH of CAD s/p PCI to LAD and LCx (2011), Afib failed multiple DCCV, ablation and antiarrythmic therapies on Xarelto and rate control strategy, HFrEF (20-25% LVEF), mod AS, HTN, prostate cancer s/p brachytherapy (2022) who presents as a transfer from [facility] in cardiogenic shock. Initially presented with SOB, hypotension and hypo perfusion with elevated lactate, JEFF and elevated transaminitis. EKG showed SHYAM III, aVF and ST depressions in I, II, V4-V6. Initially managed with ionotropic support with some improvement in renal and liver function. Additionally treated for sepsis with with antibiotics. LHC on 2/19 demonstrated triple vessel disease and TTE 2/13 showed known LVEF 20-25% with global hypokinesia, aortic valve with 0.6 RAUDEL, PG/MG of 25/13. Transferred to [facility] CICU for further management of shock and coronary revascularization.  On arrival was mentating well but he was cool and dry, and is anuric with elevated Cr to 2.51. [place] numbers demonstrated elevated filling pressures and low cardio index despite pharmacologic support with dobutamine and levophed 0.16. Presentation was consistent with SCAI Stage D shock, and  escalated to MCS with IABP placement on 2/19. Currently proceeded with swan guided management and  he continues to require pharmacological support for shock. Plan to consider high risk complex coronary intervention by Dr Banerjee as he would not be a surgical candidate. Now on CVVH for JEFF. Underwent revascularization with PCI to the LAD and LCX 2/22 without complication. Overnight 2/23 patient decompensated and started on dobutamine with improvement. Heart failure and structural team consulted to discuss advanced options. Currently not a candidate for LVAD or transplant. C/f low flow low gradient aortic stenosis however CT TAVR w/ moderate calcification of aortic valve, no indication for BAV at this time. Patient with steadily increasing levo  requirements and stable CI. Concern at this point for component of septic shock and multifocal pneumonia based on CT imaging.      Updates 2/25  -PEA->VT cardiac arrest 2/24, now intubated/sedated  -Wean fent/versed sedation with precedex to assess mental status  -Started on CVVH overnight, required minimal epi, now weaned  -Ectopy on telemetry- Wean dobutamine, continue vaso/levo  -Attempt to wean balloon pump if swan numbers improved following dobutamine wean.  -swan numbers q4-6  -repeat BCX pending   -antibiotics switched to vanc/cefepime/azithromycin  -sputum cx, strep/legionella pending   -ID consult, pulm consult, appreciate recs  -Continue to trend lactate (8 following arrest, downtrended to 4, then 5.4)       Plan:  NEURO/PSYCH:   #Sedation  On Versed 2/fentanyl 75  -Precedex to wean sedation     PULM:   #Acute hypoxic respiratory failure requiring intubation 2/25  CXR 2/22: persistent L>R b/l infiltrates representing edema, infection or atelectasis   MRSA negative (2/16); Repeat (2/21) negative  CT TAVR (2/23): centrally predominant alveolar ground glass opacities likely correlating to severe pulmonary edema in the setting of cardiogenic shock; area of infectious process suspected given areas of bronchial opacification in RLL  Likely multifactorial in the setting of multifocal PNA and pulmonary edema; could also consider ARDS or DAH on the differential  Plan:  -S/p diuresis (bumex 4mg IV 2/20; 4mg IV 2/21)  -CVVH for volume removal initiated 2/24  -Intubated at 40%/8/550/18  -Pulm consulted, appreciate recs  -see ID      CV:   #Cardiac arrest- PEA->VT 2/24  #HFrEF (LVEF 20-25%)  #CAD s/p multiple stents with known PCI to LAD and LCx (2011)  #NSTEMI  #Mod AS   ::RAUDEL 0.6, PG/MG of 25/13, calcified AV leaflets with poor opening of the valve c/f LFLG  ::[place] on presentation: CVP (not readable/clotted), PA 36/20(16) and CO/CI 6.3/2.6. MVO2 72%, SVR CNR  ::Patient decompensated overnight 2/22 with worsening  shock; dobutamine added, may consider shock call should pt continue to decline   ::Goal of systolic BP of 110 on A line   ::CT TAVR w/ moderate calcification of aortic valve; no indication for BAV   ::PEA ->VT arrest on 2/25, achieved ROSC, thought to be initiated by hypoxic PEA  Plan:  -Hold home metoprolol, entresto while in shock  -c/w asa, Plavix  -Start statin once transaminitis resolved  -c/w heparin gtt  -c/w levo/vaso, wean as tolerated   -c/w dobutamine (started 2/23), wean today   -[place] q2-4hours  -IABP placed in cath lab 12/19 PM; set 1:1   -s/p PCI to LAD/LCX 2/22 without complication   -structural heart team consulted w/ no indication for BAV at this time  -heart failure consulted; pt is not a candidate for transplant or LVAD  -CVVH for fluid removal   -Continue to trend lactate     #Afib  No beta block   -c/w heparin gtt     GI:   #Shock liver, resolving  AST 1488>375>214>106> 77>62  ALT 1073>886>452>314> 238>154  -CTM     #GI ppx  -cont pantoprazole    RENAL:   #JEFF  Cr: 2.9>4.3>2.5>1.6>1.0>2.51>2.9>2.3>2.7  Renal US 2/14 normal  Multifactorial etiology: contrast use, shock leading to ATN  Was anuric: aproducing urine s/p 4 mg IV bumex 2/20  CVVH started 2/24  Plan:  -RFP [id number]  -Renal following, appreciate recs for CVVH management  -Strict I/O, renally dose meds  -lopez in place      #prostate cancer s/p brachytherapy  Not active. Not on tamsulosin or mirabegron      HEME/ONC:   #thrombocytopenia   -stable in 120s  -low concern for DIC (, fibrinogen 778)  -likely 2/2 sepsis     #macrocytic anemia  -hgb downtrending from 13 on admission  -stable at 8 over the past few days; s/p 1U pRBCs 2/23 without appropriate increment   -mild oozing at IABP site, CTM   -hemolysis workup (, hapto pending)  -retic elevated (4.4)  -B12 pending   -peripheral smear pending     ID:   #PNA (aspiration vs Covid)  #c/f septic shock  ::CT TAVR w/ mucous in the trachea and central airways, particularly RLL,  focal consolidative opacities c/f superimposed aspiration pneumonitis; ground glass c/f superimposed infectious/inflammatory process   ::White count uptrending (21 from 18)  BCx 2/13 x 2 negative  Bcx 2/19: negative x2  MRSA negative (2/16, 2/21)  UCx 2/13: negative  s/p CTX 2/13-2/17 for PNA  Plan  -c/w zosyn (2/18 - 2/24); change to meropenem (2/25-*)  -cont vanc (2/21 - )  -add azithro (2/24 - )  -strep, legionella pending  -covid, flu pending  -Bcx 2/23 NGTD  -ID consult, appreciate recs    ENDO:  #Hyperglycemia - resolved   In setting of steroid use for COPD exacerbation  -A1C 5.4     N: NPO  GI: pantoprazole  DVT: heparin gtt  A: PIVs, kathy, swan, trialysis   Drips: Fent 75, versed 1, levo 1.8, vaso 0.06, dobutamine 2.5  NOK: Wife Misti [phone] (home) and [phone](mobile)  DNR/DNI (confirmed on admission)           Nara Machado MD

## 2024-02-25 NOTE — CARE PLAN
Problem: Skin  Goal: Decreased wound size/increased tissue granulation at next dressing change  Outcome: Progressing  Flowsheets (Taken 2/24/2024 0632 by Francesco Alonzo RN)  Decreased wound size/increased tissue granulation at next dressing change: Protective dressings over bony prominences  Goal: Participates in plan/prevention/treatment measures  Outcome: Progressing  Flowsheets (Taken 2/24/2024 0632 by Francesco Alonzo RN)  Participates in plan/prevention/treatment measures:   Discuss with provider PT/OT consult   Elevate heels  Goal: Prevent/manage excess moisture  Outcome: Progressing  Goal: Prevent/minimize sheer/friction injuries  Outcome: Progressing  Goal: Promote/optimize nutrition  Outcome: Progressing  Flowsheets (Taken 2/24/2024 0632 by Francesco Alonzo RN)  Promote/optimize nutrition: Monitor/record intake including meals  Goal: Promote skin healing  Outcome: Progressing     Problem: Safety  Goal: Patient will be injury free during hospitalization  Outcome: Progressing  Goal: I will remain free of falls  Outcome: Progressing     Problem: Safety - Medical Restraint  Goal: Remains free of injury from restraints (Restraint for Interference with Medical Device)  Outcome: Progressing  Flowsheets (Taken 2/24/2024 0631 by Francesco Alonzo RN)  Remains free of injury from restraints (restraint for interference with medical device): Every 2 hours: Monitor safety, psychosocial status, comfort, nutrition and hydration   The patient's goals for the shift include      The clinical goals for the shift include Patient will remain hemodynamically stable

## 2024-02-26 ENCOUNTER — APPOINTMENT (OUTPATIENT)
Dept: RADIOLOGY | Facility: HOSPITAL | Age: 79
DRG: 270 | End: 2024-02-26
Payer: MEDICARE

## 2024-02-26 VITALS
BODY MASS INDEX: 30.43 KG/M2 | DIASTOLIC BLOOD PRESSURE: 51 MMHG | SYSTOLIC BLOOD PRESSURE: 66 MMHG | WEIGHT: 244.71 LBS | HEIGHT: 75 IN | OXYGEN SATURATION: 98 % | TEMPERATURE: 90 F

## 2024-02-26 LAB
ACANTHOCYTES BLD QL SMEAR: ABNORMAL
ANION GAP SERPL CALC-SCNC: 24 MMOL/L (ref 10–20)
BACTERIA SPEC RESP CULT: ABNORMAL
BASOPHILS # BLD MANUAL: 0 X10*3/UL (ref 0–0.1)
BASOPHILS NFR BLD MANUAL: 0 %
BUN SERPL-MCNC: 49 MG/DL (ref 6–23)
BURR CELLS BLD QL SMEAR: ABNORMAL
CA-I BLD-SCNC: 0.95 MMOL/L (ref 1.1–1.33)
CHLORIDE SERPL-SCNC: 94 MMOL/L (ref 98–107)
CO2 SERPL-SCNC: 16 MMOL/L (ref 21–32)
CREAT SERPL-MCNC: 2.43 MG/DL (ref 0.5–1.3)
EGFRCR SERPLBLD CKD-EPI 2021: 27 ML/MIN/1.73M*2
EOSINOPHIL # BLD MANUAL: 0.22 X10*3/UL (ref 0–0.4)
EOSINOPHIL NFR BLD MANUAL: 0.9 %
ERYTHROCYTE [DISTWIDTH] IN BLOOD BY AUTOMATED COUNT: 16.2 % (ref 11.5–14.5)
GRAM STN SPEC: ABNORMAL
GRAM STN SPEC: ABNORMAL
HCT VFR BLD AUTO: 22.8 % (ref 41–52)
HGB BLD-MCNC: 7.1 G/DL (ref 13.5–17.5)
IMM GRANULOCYTES # BLD AUTO: 2.43 X10*3/UL (ref 0–0.5)
IMM GRANULOCYTES NFR BLD AUTO: 9.8 % (ref 0–0.9)
LACTATE BLDV-SCNC: 13.3 MMOL/L (ref 0.4–2)
LYMPHOCYTES # BLD MANUAL: 0.84 X10*3/UL (ref 0.8–3)
LYMPHOCYTES NFR BLD MANUAL: 3.4 %
MAGNESIUM SERPL-MCNC: 2.69 MG/DL (ref 1.6–2.4)
MCH RBC QN AUTO: 34.8 PG (ref 26–34)
MCHC RBC AUTO-ENTMCNC: 31.1 G/DL (ref 32–36)
MCV RBC AUTO: 112 FL (ref 80–100)
MONOCYTES # BLD MANUAL: 1.06 X10*3/UL (ref 0.05–0.8)
MONOCYTES NFR BLD MANUAL: 4.3 %
MYELOCYTES # BLD MANUAL: 0.42 X10*3/UL
MYELOCYTES NFR BLD MANUAL: 1.7 %
NEUTROPHILS # BLD MANUAL: 22.16 X10*3/UL (ref 1.6–5.5)
NEUTS BAND # BLD MANUAL: 2.35 X10*3/UL (ref 0–0.5)
NEUTS BAND NFR BLD MANUAL: 9.5 %
NEUTS SEG # BLD MANUAL: 19.81 X10*3/UL (ref 1.6–5)
NEUTS SEG NFR BLD MANUAL: 80.2 %
NRBC BLD-RTO: 9.9 /100 WBCS (ref 0–0)
PHOSPHATE SERPL-MCNC: 7.9 MG/DL (ref 2.5–4.9)
PLATELET # BLD AUTO: 72 X10*3/UL (ref 150–450)
POLYCHROMASIA BLD QL SMEAR: ABNORMAL
POTASSIUM SERPL-SCNC: 6.1 MMOL/L (ref 3.5–5.3)
RBC # BLD AUTO: 2.04 X10*6/UL (ref 4.5–5.9)
RBC MORPH BLD: ABNORMAL
SODIUM SERPL-SCNC: 128 MMOL/L (ref 136–145)
TOTAL CELLS COUNTED BLD: 116
UFH PPP CHRO-ACNC: 0.8 IU/ML
WBC # BLD AUTO: 24.7 X10*3/UL (ref 4.4–11.3)

## 2024-02-26 PROCEDURE — 94003 VENT MGMT INPAT SUBQ DAY: CPT

## 2024-02-26 PROCEDURE — 82330 ASSAY OF CALCIUM: CPT

## 2024-02-26 PROCEDURE — 2500000004 HC RX 250 GENERAL PHARMACY W/ HCPCS (ALT 636 FOR OP/ED)

## 2024-02-26 PROCEDURE — 85007 BL SMEAR W/DIFF WBC COUNT: CPT

## 2024-02-26 PROCEDURE — 37799 UNLISTED PX VASCULAR SURGERY: CPT

## 2024-02-26 PROCEDURE — 82565 ASSAY OF CREATININE: CPT

## 2024-02-26 PROCEDURE — 80051 ELECTROLYTE PANEL: CPT

## 2024-02-26 PROCEDURE — 84100 ASSAY OF PHOSPHORUS: CPT

## 2024-02-26 PROCEDURE — 85027 COMPLETE CBC AUTOMATED: CPT

## 2024-02-26 PROCEDURE — 2500000004 HC RX 250 GENERAL PHARMACY W/ HCPCS (ALT 636 FOR OP/ED): Mod: JZ | Performed by: STUDENT IN AN ORGANIZED HEALTH CARE EDUCATION/TRAINING PROGRAM

## 2024-02-26 PROCEDURE — 36415 COLL VENOUS BLD VENIPUNCTURE: CPT | Performed by: STUDENT IN AN ORGANIZED HEALTH CARE EDUCATION/TRAINING PROGRAM

## 2024-02-26 PROCEDURE — 83735 ASSAY OF MAGNESIUM: CPT

## 2024-02-26 PROCEDURE — 83605 ASSAY OF LACTIC ACID: CPT | Performed by: STUDENT IN AN ORGANIZED HEALTH CARE EDUCATION/TRAINING PROGRAM

## 2024-02-26 PROCEDURE — 2500000004 HC RX 250 GENERAL PHARMACY W/ HCPCS (ALT 636 FOR OP/ED): Performed by: STUDENT IN AN ORGANIZED HEALTH CARE EDUCATION/TRAINING PROGRAM

## 2024-02-26 PROCEDURE — 84520 ASSAY OF UREA NITROGEN: CPT

## 2024-02-26 RX ORDER — CALCIUM GLUCONATE 20 MG/ML
1 INJECTION, SOLUTION INTRAVENOUS EVERY 4 HOURS
Status: COMPLETED | OUTPATIENT
Start: 2024-02-26 | End: 2024-02-26

## 2024-02-26 RX ADMIN — VANCOMYCIN HYDROCHLORIDE 1000 MG: 1 INJECTION, SOLUTION INTRAVENOUS at 01:19

## 2024-02-26 RX ADMIN — DEXTROSE MONOHYDRATE 2 MCG/KG/MIN: 50 INJECTION, SOLUTION INTRAVENOUS at 00:22

## 2024-02-26 RX ADMIN — CALCIUM GLUCONATE 1 G: 20 INJECTION, SOLUTION INTRAVENOUS at 02:23

## 2024-02-26 RX ADMIN — DEXTROSE MONOHYDRATE 2 MCG/KG/MIN: 50 INJECTION, SOLUTION INTRAVENOUS at 01:39

## 2024-02-26 NOTE — NURSING NOTE
Per family wishes crrt was discontinued at 0320 and patient was terminally extubated at about 0330. Patient was placed on comfort measures per family wishes and pressure support was turned off per writer at the okay per family at bedside at 0340. Patient appeared very comfortable and family denied any more needs at that time. Patient was asystole on monitor at 0345 and IABP was turned off at that point. Resident called to bedside to assessment patient.

## 2024-02-26 NOTE — SIGNIFICANT EVENT
Death Within 24 Hours of Soft Wrist Restraint Utilization    Death within 24 hours of soft wrist restraint logged at 2/26/2024 at 12:04 PM.    Ria Smith RN  Date: 2/26/2024  Time: 12:04 PM

## 2024-02-26 NOTE — SIGNIFICANT EVENT
Death Note -     I was notified by nursing that the patient had passed.   I came to bedside. I examined the patient and there was no pupillary response to light. I did not observe spontaneous breathing or appreciate heart sounds on auscultation. There was no palpable radial pulse. The patient did not respond to nail bed stimuli.Patient was pronounced  at 0400 on 2024. I offered my condolences to the family members and offered access to additional services at the hospital such as social work.

## 2024-02-26 NOTE — DISCHARGE SUMMARY
Discharge Diagnosis  Shock (CMS/HCC)    Issues Requiring Follow-Up  none    Test Results Pending At Discharge  Pending Labs       Order Current Status    Legionella Antigen, Urine Collected (02/24/24 0739)    Streptococcus pneumoniae Antigen, Urine Collected (02/24/24 0739)    Blood Gas Arterial In process    Blood Gas Lactic Acid, Venous In process    Blood Gas Lactic Acid, Venous In process    Blood Gas Lactic Acid, Venous In process    Blood Gas Lactic Acid, Venous In process    Blood Gas Lactic Acid, Venous In process    Blood Gas Lactic Acid, Venous In process    Pathologist Review-CBC Differential In process    Respiratory Viral Panel In process    Blood Culture Preliminary result    Blood Culture Preliminary result    Respiratory Culture/Smear Preliminary result            Hospital Course  Mr Tesfaye Milton is a 78 y.o. male with PMH of CAD s/p PCI to LAD and LCx (2011), Afib failed multiple DCCV, ablation and antiarrythmic therapies on Xarelto and rate control strategy, HFrEF (20-25% LVEF), mod AS, HTN, prostate cancer s/p brachytherapy (2022) who presents as a transfer from [facility] in cardiogenic shock. Initially presented with SOB, hypotension and hypo perfusion with elevated lactate, JEFF and elevated transaminitis. EKG showed SHYAM III, aVF and ST depressions in I, II, V4-V6. Initially managed with ionotropic support with some improvement in renal and liver function. Additionally treated for sepsis with with antibiotics. LHC on 2/19 demonstrated triple vessel disease and TTE 2/13 showed known LVEF 20-25% with global hypokinesia, aortic valve with 0.6 RAUDEL, PG/MG of 25/13. Transferred to [facility] CICU for further management of shock and coronary revascularization.  On arrival was mentating well but he was cool and dry, and is anuric with elevated Cr to 2.51. [place] numbers demonstrated elevated filling pressures and low cardio index despite pharmacologic support with dobutamine and levophed 0.16. Presentation was  consistent with SCAI Stage D shock, and  escalated to MCS with IABP placement on . Currently proceeded with swan guided management and  he continues to require pharmacological support for shock. Plan to consider high risk complex coronary intervention by Dr Banerjee as he would not be a surgical candidate. Renal following for possible CVVH given anuric JEFF. Underwent revascularization with PCI to the LAD and LCX  without complication. Overnight  patient decompensated and started on dobutamine with improvement. Heart failure and structural team consulted to discuss advanced options. Currently not a candidate for LVAD or transplant. C/f low flow low gradient aortic stenosis however CT TAVR w/ moderate calcification of aortic valve, no indication for BAV at this time. Patient with steadily increasing levo requirements and stable CI. Concern at this point for component of septic shock and multifocal pneumonia based on CT imaging. Patient had PEA/VT arrest on , was intubated post arrest. ID and Pulm consulted for assistance with management. Patient with continually increasing pressor requirements and rising lactate. Ultimately transitioned to comfort care and passed on .     Pertinent Physical Exam At Time of Discharge  Physical Exam  I examined the patient and there was no pupillary response to light. I did not observe spontaneous breathing or appreciate heart sounds on auscultation. There was no palpable radial pulse. The patient did not respond to nail bed stimuli.Patient was pronounced  at 0400 on 2024.      Home Medications     Medication List      ASK your doctor about these medications     clopidogrel 75 mg tablet; Commonly known as: Plavix   Entresto  mg tablet; Generic drug: sacubitriL-valsartan   metoprolol succinate  mg 24 hr tablet; Commonly known as:   Toprol-XL; Take 1 tablet (100 mg) by mouth once daily. Do not crush or   chew.   metoprolol tartrate 50 mg  tablet; Commonly known as: Lopressor; Take 1   tablet by mouth 3 times a day. For 90 days   Myrbetriq 25 mg tablet extended release 24 hr 24 hr tablet; Generic   drug: mirabegron   nitroglycerin 0.4 mg SL tablet; Commonly known as: Nitrostat   rosuvastatin 10 mg tablet; Commonly known as: Crestor; Take 1 tablet (10   mg) by mouth once daily.   tamsulosin 0.4 mg 24 hr capsule; Commonly known as: Flomax   Xarelto 20 mg tablet; Generic drug: rivaroxaban       Outpatient Follow-Up  No future appointments.    Shahriar Damon MD

## 2024-02-26 NOTE — HOSPITAL COURSE
Mr Tesfaye Milton is a 78 y.o. male with PMH of CAD s/p PCI to LAD and LCx (2011), Afib failed multiple DCCV, ablation and antiarrythmic therapies on Xarelto and rate control strategy, HFrEF (20-25% LVEF), mod AS, HTN, prostate cancer s/p brachytherapy (2022) who presents as a transfer from [facility] in cardiogenic shock. Initially presented with SOB, hypotension and hypo perfusion with elevated lactate, JEFF and elevated transaminitis. EKG showed SHYAM III, aVF and ST depressions in I, II, V4-V6. Initially managed with ionotropic support with some improvement in renal and liver function. Additionally treated for sepsis with with antibiotics. LHC on 2/19 demonstrated triple vessel disease and TTE 2/13 showed known LVEF 20-25% with global hypokinesia, aortic valve with 0.6 RAUDEL, PG/MG of 25/13. Transferred to [facility] CICU for further management of shock and coronary revascularization.  On arrival was mentating well but he was cool and dry, and is anuric with elevated Cr to 2.51. [place] numbers demonstrated elevated filling pressures and low cardio index despite pharmacologic support with dobutamine and levophed 0.16. Presentation was consistent with SCAI Stage D shock, and  escalated to MCS with IABP placement on 2/19. Currently proceeded with swan guided management and  he continues to require pharmacological support for shock. Plan to consider high risk complex coronary intervention by Dr Banerjee as he would not be a surgical candidate. Renal following for possible CVVH given anuric JEFF. Underwent revascularization with PCI to the LAD and LCX 2/22 without complication. Overnight 2/23 patient decompensated and started on dobutamine with improvement. Heart failure and structural team consulted to discuss advanced options. Currently not a candidate for LVAD or transplant. C/f low flow low gradient aortic stenosis however CT TAVR w/ moderate calcification of aortic valve, no indication for BAV at this time. Patient with  steadily increasing levo requirements and stable CI. Concern at this point for component of septic shock and multifocal pneumonia based on CT imaging. Patient had PEA/VT arrest on 2/24, was intubated post arrest. ID and Pulm consulted for assistance with management. Patient with continually increasing pressor requirements and rising lactate. Ultimately transitioned to comfort care and passed on 2/26.

## 2024-02-26 NOTE — ACP (ADVANCE CARE PLANNING)
Confirming Previous Code Status:   Advance Care Planning Note     Discussion Date: 02/26/24   Discussion Participants: Wife/NOK    The patient wishes to discuss Advance Care Planning today and the following is a brief summary of our discussion.     Patient has capacity to make their own medical decisions: No  Health Care Agent/Surrogate Decision Maker documented in chart: Yes      Communication of Medical Status/Prognosis:   Terminal     Communication of Treatment Goals/Options:   Focus on Comfort     Treatment Decisions  Goals of Care: comfort is paramount; other goals are not relevant or achievable     Follow Up Plan  Comfort Care   Team Members  CICU Team   Time Statement: Total face to face time spent on advance care planning was 30 minutes with 30 minutes spent in counseling, including the explanation.    Stan Wiseman MD  2/26/2024 3:19 AM

## 2024-02-27 LAB
ADENOVIRUS RVP, VIRC: NOT DETECTED
BACTERIA BLD CULT: NORMAL
BACTERIA BLD CULT: NORMAL
ENTEROVIRUS/RHINOVIRUS RVP, VIRC: NOT DETECTED
HUMAN BOCAVIRUS RVP, VIRC: NOT DETECTED
HUMAN CORONAVIRUS RVP, VIRC: NOT DETECTED
INFLUENZA A , VIRC: NOT DETECTED
INFLUENZA A H1N1-09 , VIRC: NOT DETECTED
INFLUENZA B PCR, VIRC: NOT DETECTED
METAPNEUMOVIRUS , VIRC: NOT DETECTED
PARAINFLUENZA PCR, VIRC: NOT DETECTED
RSV PCR, RVP, VIRC: NOT DETECTED

## 2024-03-19 ENCOUNTER — APPOINTMENT (OUTPATIENT)
Dept: PRIMARY CARE | Facility: CLINIC | Age: 79
End: 2024-03-19
Payer: MEDICARE

## 2024-07-18 ENCOUNTER — APPOINTMENT (OUTPATIENT)
Dept: CARDIOLOGY | Facility: CLINIC | Age: 79
End: 2024-07-18
Payer: MEDICARE

## (undated) DEVICE — MICROINTRODUCER KIT, VSI, 4FR X 40CM, STIFFEN

## (undated) DEVICE — CATHETER, EXPO, MODEL-D, 6FR FR4

## (undated) DEVICE — TR BAND, RADIAL COMPRESSION, STANDARD, 24CM

## (undated) DEVICE — PACK, ANGIO P2, CUSTOM, LAKE

## (undated) DEVICE — CATHETER, EXPO, MODEL-D, 6FR PIG 110CM

## (undated) DEVICE — CATHETER, BALLOON DILATION, EUPHORA SEMICOMPLIANT 2.50  X 20 MM X 142CM

## (undated) DEVICE — ELECTRODE, QUICK-PACE, EDGE RTS, COMBO

## (undated) DEVICE — KIT, NAMIC STANDARD LEFT HEART, CUSTOM, LWMC

## (undated) DEVICE — ACCESS KIT, MINI MAK, 4 FR X 10CM, 0.018 X 40CM, NITINOL/PLATINUM, STD NEEDLE

## (undated) DEVICE — GUIDEWIRE, J TIP, 3 MM, 0.035 IN X 150 CM, PTFE

## (undated) DEVICE — CATHETER, BALLOON, NC EUPHORA NONCOMPLIANT 4.0 X 15 X 142CM

## (undated) DEVICE — INTRODUCER, SHEATH, AVANTI PLUS, W/MINI WIRE, STANDARD, 6MS FR, 11 CM

## (undated) DEVICE — CATHETER, BALLOON, NC EUPHORA NONCOMPLIANT 3.5 X 20 X 142CM

## (undated) DEVICE — CATHETER, GUIDING LAUNCHER 6FR EBU 3.75 LEFT

## (undated) DEVICE — COVER, EQUIPMENT, ZERO GRAVITY

## (undated) DEVICE — ELECTRODE, QUICK-COMBO, EDGE SYSTEM, REDI PACK

## (undated) DEVICE — CATHETER, IVUS, REFINITY SHORT TIP, 5FR 135CM 0.014

## (undated) DEVICE — GUIDEWIRE, INQWIRE, 3MM J, .035 X 210CM, FIXED

## (undated) DEVICE — Device

## (undated) DEVICE — CATHETER, BALLOON, NC EUPHORA NONCOMPLIANT 2.5 X 15 X 142CM

## (undated) DEVICE — SHEATH, PINNACLE, 10 CM,  7FR INTRODUCER, 7FR DIA, 2.5 CM DIALATOR

## (undated) DEVICE — CATHETER, BALLOON DILATION, EUPHORA SEMICOMPLIANT 3.00  X 20 MM X 142CM

## (undated) DEVICE — GUIDEWIRE, SION BLUE PTCA, 0.041 X 190CM, STRT

## (undated) DEVICE — CATHETER, EXPO, MODEL-D, 6FR FL4

## (undated) DEVICE — SHEATH, GLIDESHEATH, SLENDER, 6FR 10CM

## (undated) DEVICE — CATHETER, BALLOON, NC EUPHORA NONCOMPLIANT 3.25 X 15 X 142CM

## (undated) DEVICE — INTRODUCER, SHEATH, HEMO VALVE, W/ LUER LOCK HUB, SIDEPORT, 8.5FR X 12CM

## (undated) DEVICE — VALVE, HEMO, GUARDIAN II, W/GUIDEWIRE INSERTION TOOL & TORQUE

## (undated) DEVICE — CATHETER, THERMODILUTION, SWAN GANZ, VIP, 5 LUMEN, 7.5 FR, 110 CM

## (undated) DEVICE — CATHETER, INTRA-AORTIC BALLOON, MEGA, 8FR, 50CC

## (undated) DEVICE — INFLATION KIT, ADVANTAGE, ENCORE 26 (1/BOX)

## (undated) DEVICE — GUIDEWIRE, RUN THROUGH WIRE, 180CM